# Patient Record
Sex: FEMALE | Race: WHITE | NOT HISPANIC OR LATINO | Employment: FULL TIME | URBAN - METROPOLITAN AREA
[De-identification: names, ages, dates, MRNs, and addresses within clinical notes are randomized per-mention and may not be internally consistent; named-entity substitution may affect disease eponyms.]

---

## 2017-03-17 DIAGNOSIS — I10 ESSENTIAL HYPERTENSION: ICD-10-CM

## 2017-03-17 RX ORDER — LISINOPRIL AND HYDROCHLOROTHIAZIDE 20; 25 MG/1; MG/1
1 TABLET ORAL DAILY
Qty: 30 TABLET | Refills: 1 | Status: SHIPPED | OUTPATIENT
Start: 2017-03-17 | End: 2017-05-08 | Stop reason: SDUPTHER

## 2017-03-17 NOTE — TELEPHONE ENCOUNTER
----- Message from Noahhsaymini Machadoes sent at 3/17/2017 12:13 PM CDT -----  Contact: [phone]  Pt would like refills called in on Rx Medication Lisinopril.  Please call back @ [phone].  Thanks-[facility]       Pt Uses:  .  [facility]/pharmacy #[id number] - [place] - [address] [facility] [address] [place]  [address] [place] [address]  [place] [address]  Phone: [phone] Fax: [phone]

## 2017-03-24 ENCOUNTER — TELEPHONE (OUTPATIENT)
Dept: FAMILY MEDICINE | Facility: CLINIC | Age: 47
End: 2017-03-24

## 2017-03-24 DIAGNOSIS — F41.9 ANXIETY: ICD-10-CM

## 2017-03-24 DIAGNOSIS — Z00.00 ROUTINE HEALTH MAINTENANCE: Primary | ICD-10-CM

## 2017-03-24 DIAGNOSIS — M79.7 FIBROMYALGIA: ICD-10-CM

## 2017-03-24 RX ORDER — DULOXETIN HYDROCHLORIDE 60 MG/1
CAPSULE, DELAYED RELEASE ORAL
Qty: 30 CAPSULE | Refills: 1 | Status: SHIPPED | OUTPATIENT
Start: 2017-03-24 | End: 2017-05-18 | Stop reason: SDUPTHER

## 2017-05-08 DIAGNOSIS — I10 ESSENTIAL HYPERTENSION: ICD-10-CM

## 2017-05-08 RX ORDER — LISINOPRIL AND HYDROCHLOROTHIAZIDE 20; 25 MG/1; MG/1
TABLET ORAL
Qty: 30 TABLET | Refills: 1 | Status: SHIPPED | OUTPATIENT
Start: 2017-05-08 | End: 2017-06-06 | Stop reason: SDUPTHER

## 2017-05-18 DIAGNOSIS — M79.7 FIBROMYALGIA: ICD-10-CM

## 2017-05-18 DIAGNOSIS — F41.9 ANXIETY: ICD-10-CM

## 2017-05-18 RX ORDER — DULOXETIN HYDROCHLORIDE 60 MG/1
CAPSULE, DELAYED RELEASE ORAL
Qty: 30 CAPSULE | Refills: 10 | Status: SHIPPED | OUTPATIENT
Start: 2017-05-18 | End: 2017-06-06 | Stop reason: SDUPTHER

## 2017-06-06 ENCOUNTER — OFFICE VISIT (OUTPATIENT)
Dept: FAMILY MEDICINE | Facility: CLINIC | Age: 47
End: 2017-06-06
Payer: COMMERCIAL

## 2017-06-06 VITALS
HEART RATE: 101 BPM | BODY MASS INDEX: 25.45 KG/M2 | SYSTOLIC BLOOD PRESSURE: 123 MMHG | DIASTOLIC BLOOD PRESSURE: 83 MMHG | HEIGHT: 64 IN | OXYGEN SATURATION: 96 % | TEMPERATURE: 99 F | WEIGHT: 149.06 LBS

## 2017-06-06 DIAGNOSIS — F41.9 ANXIETY: ICD-10-CM

## 2017-06-06 DIAGNOSIS — M79.7 FIBROMYALGIA: ICD-10-CM

## 2017-06-06 DIAGNOSIS — I10 ESSENTIAL HYPERTENSION: ICD-10-CM

## 2017-06-06 DIAGNOSIS — G47.00 INSOMNIA, UNSPECIFIED TYPE: ICD-10-CM

## 2017-06-06 PROCEDURE — 99999 PR PBB SHADOW E&M-EST. PATIENT-LVL III: CPT | Mod: PBBFAC,,,

## 2017-06-06 PROCEDURE — 99214 OFFICE O/P EST MOD 30 MIN: CPT | Mod: S$GLB,,,

## 2017-06-06 RX ORDER — DULOXETIN HYDROCHLORIDE 60 MG/1
60 CAPSULE, DELAYED RELEASE ORAL DAILY
Qty: 90 CAPSULE | Refills: 3 | Status: SHIPPED | OUTPATIENT
Start: 2017-06-06 | End: 2017-10-16 | Stop reason: SDUPTHER

## 2017-06-06 RX ORDER — ZOLPIDEM TARTRATE 5 MG/1
5 TABLET ORAL NIGHTLY PRN
Qty: 90 TABLET | Refills: 1 | Status: SHIPPED | OUTPATIENT
Start: 2017-06-06 | End: 2017-10-17

## 2017-06-06 RX ORDER — LISINOPRIL AND HYDROCHLOROTHIAZIDE 20; 25 MG/1; MG/1
1 TABLET ORAL DAILY
Qty: 90 TABLET | Refills: 3 | Status: SHIPPED | OUTPATIENT
Start: 2017-06-06 | End: 2017-06-10 | Stop reason: SDUPTHER

## 2017-06-06 RX ORDER — HYDROXYZINE HYDROCHLORIDE 50 MG/1
50 TABLET, FILM COATED ORAL NIGHTLY PRN
Qty: 30 TABLET | Refills: 11 | Status: CANCELLED | OUTPATIENT
Start: 2017-06-06

## 2017-06-06 RX ORDER — CYCLOBENZAPRINE HCL 10 MG
10 TABLET ORAL
COMMUNITY
Start: 2016-03-01 | End: 2017-06-06 | Stop reason: SDUPTHER

## 2017-06-07 ENCOUNTER — TELEPHONE (OUTPATIENT)
Dept: FAMILY MEDICINE | Facility: CLINIC | Age: 47
End: 2017-06-07

## 2017-06-07 NOTE — TELEPHONE ENCOUNTER
----- Message from Tata Parham sent at 6/7/2017 12:09 PM CDT -----  Pt states she need her prescription refill on symbalta, lisenopril and ambien all call to [address] [place] [address] pharmacy. Pt can be reach at [phone].

## 2017-06-07 NOTE — PROGRESS NOTES
Subjective:       Patient ID: Kylee Cha is a 46 y.o. female.    Chief Complaint: Medication Refill    HPI   The patient presents today for an annual physical exam and refill on medications she takes routinely for chronic health issues.  The patient voices a history of hypertension for which she currently takes lisinopril/HCTZ. she is tolerating the medication well and is in good compliance.  The patient is experiencing no side effects.  Counseling was offered regarding low salt diets. she denies  a reduced salt intake. she  denies chest pain, palpitations, shortness of breath, dyspnea on exertion, neck pain, nausea, vomiting, diaphoresis, paroxysmal nocturnal dyspnea, or orthopnea.  She denies a regular exercise routine.     The patient voices a chronic history of anxiety and fibromyositis for which she currently takes Cymbalta 60 mg by mouth daily.  She says this keeps the symptoms of both disorders stable and well controlled.  She denies any side effects from the medication.    Patient voices a chronic history of insomnia which she says is constant and moderate to severe in intensity.  She says she gets 2-3 hours of sleep per night.  She has been taking Atarax which she says no longer works.  She says she has not tried any other sleep medications.  She voices some associated daytime sleepiness.  She cannot identify exacerbating or mitigating factors.   .      Review of Systems   Constitutional: Negative for activity change, appetite change, fatigue and unexpected weight change.   HENT: Negative.    Eyes: Negative.    Respiratory: Negative for cough, chest tightness, shortness of breath and wheezing.    Cardiovascular: Negative for chest pain, palpitations and leg swelling.        Elevated blood pressure     Gastrointestinal: Negative for constipation, diarrhea, nausea and vomiting.   Endocrine: Negative.    Genitourinary: Negative.    Musculoskeletal: Positive for myalgias.   Skin: Negative for color  change.   Allergic/Immunologic: Negative.    Neurological: Negative for dizziness, weakness and light-headedness.   Hematological: Negative.    Psychiatric/Behavioral: Positive for sleep disturbance. The patient is nervous/anxious.          Objective:      Physical Exam   Constitutional: She is oriented to person, place, and time. She appears well-developed and well-nourished. No distress.   HENT:   Head: Normocephalic and atraumatic. Hair is normal.   Right Ear: Hearing, tympanic membrane, external ear and ear canal normal.   Left Ear: Hearing, tympanic membrane, external ear and ear canal normal.   Nose: No mucosal edema, rhinorrhea, nose lacerations, sinus tenderness, nasal deformity, septal deviation or nasal septal hematoma. No epistaxis.  No foreign bodies. Right sinus exhibits no maxillary sinus tenderness and no frontal sinus tenderness. Left sinus exhibits no maxillary sinus tenderness and no frontal sinus tenderness.   Mouth/Throat: Uvula is midline, oropharynx is clear and moist and mucous membranes are normal.   Eyes: Conjunctivae are normal. Pupils are equal, round, and reactive to light. Right eye exhibits no discharge. Left eye exhibits no discharge.   Neck: Trachea normal, normal range of motion and phonation normal. Neck supple. No tracheal deviation present.   Cardiovascular: Normal rate, regular rhythm, normal heart sounds and intact distal pulses.  Exam reveals no gallop and no friction rub.    No murmur heard.  Pulmonary/Chest: Effort normal and breath sounds normal. No respiratory distress. She has no decreased breath sounds. She has no wheezes. She has no rhonchi. She has no rales. She exhibits no tenderness.   Musculoskeletal: Normal range of motion.   Lymphadenopathy:        Head (right side): No submental, no submandibular, no tonsillar, no preauricular, no posterior auricular and no occipital adenopathy present.        Head (left side): No submental, no submandibular, no tonsillar, no  preauricular, no posterior auricular and no occipital adenopathy present.     She has no cervical adenopathy.        Right cervical: No superficial cervical, no deep cervical and no posterior cervical adenopathy present.       Left cervical: No superficial cervical, no deep cervical and no posterior cervical adenopathy present.   Neurological: She is alert and oriented to person, place, and time. She exhibits normal muscle tone. GCS eye subscore is 4. GCS verbal subscore is 5. GCS motor subscore is 6.   Skin: Skin is warm and dry. No rash noted. She is not diaphoretic. No erythema. No pallor.   Psychiatric: She has a normal mood and affect. Her speech is normal and behavior is normal. Judgment and thought content normal.       Assessment:       1. Essential hypertension    2. Insomnia, unspecified type    3. Anxiety    4. Fibromyalgia          Plan:   Essential hypertension  -     lisinopril-hydrochlorothiazide (PRINZIDE,ZESTORETIC) 20-25 mg Tab; Take 1 tablet by mouth once daily.  Dispense: 90 tablet; Refill: 3  -     CBC auto differential; Future; Expected date: 06/06/2017  -     Comprehensive metabolic panel; Future; Expected date: 06/06/2017  -     TSH; Future; Expected date: 06/06/2017  -     Lipid panel; Future; Expected date: 06/06/2017  -     Cancel: Microalbumin/creatinine urine ratio  -     Microalbumin/creatinine urine ratio; Future; Expected date: 06/13/2017    Insomnia, unspecified type  -     zolpidem (AMBIEN) 5 MG Tab; Take 1 tablet (5 mg total) by mouth nightly as needed.  Dispense: 90 tablet; Refill: 1  -     CBC auto differential; Future; Expected date: 06/06/2017  -     Comprehensive metabolic panel; Future; Expected date: 06/06/2017  -     TSH; Future; Expected date: 06/06/2017  -     Lipid panel; Future; Expected date: 06/06/2017  -     Cancel: Microalbumin/creatinine urine ratio  -     Microalbumin/creatinine urine ratio; Future; Expected date: 06/13/2017    Anxiety  -     duloxetine (CYMBALTA)  60 MG capsule; Take 1 capsule (60 mg total) by mouth once daily.  Dispense: 90 capsule; Refill: 3  -     CBC auto differential; Future; Expected date: 06/06/2017  -     Comprehensive metabolic panel; Future; Expected date: 06/06/2017  -     TSH; Future; Expected date: 06/06/2017  -     Lipid panel; Future; Expected date: 06/06/2017  -     Cancel: Microalbumin/creatinine urine ratio  -     Microalbumin/creatinine urine ratio; Future; Expected date: 06/13/2017    Fibromyalgia  -     duloxetine (CYMBALTA) 60 MG capsule; Take 1 capsule (60 mg total) by mouth once daily.  Dispense: 90 capsule; Refill: 3  -     CBC auto differential; Future; Expected date: 06/06/2017  -     Comprehensive metabolic panel; Future; Expected date: 06/06/2017  -     TSH; Future; Expected date: 06/06/2017  -     Lipid panel; Future; Expected date: 06/06/2017  -     Cancel: Microalbumin/creatinine urine ratio    Other orders  -     Cancel: hydrOXYzine (ATARAX) 50 MG tablet; Take 1 tablet (50 mg total) by mouth nightly as needed (insomnia).  Dispense: 30 tablet; Refill: 11            Disclaimer: This note is prepared using voice recognition software.  As such there may be errors in the dictation.  It has not been proofread.

## 2017-06-10 DIAGNOSIS — I10 ESSENTIAL HYPERTENSION: ICD-10-CM

## 2017-06-10 RX ORDER — LISINOPRIL AND HYDROCHLOROTHIAZIDE 20; 25 MG/1; MG/1
TABLET ORAL
Qty: 30 TABLET | Refills: 0 | Status: SHIPPED | OUTPATIENT
Start: 2017-06-10 | End: 2017-10-20

## 2017-06-10 NOTE — TELEPHONE ENCOUNTER
I am refilling a requested medication x 1 for the patient and reviewed the chart.  The patient needs to get her lab done that Bryce ordered

## 2017-08-06 ENCOUNTER — NURSE TRIAGE (OUTPATIENT)
Dept: ADMINISTRATIVE | Facility: CLINIC | Age: 47
End: 2017-08-06

## 2017-08-06 NOTE — TELEPHONE ENCOUNTER
"    Reason for Disposition   Patient sounds very sick or weak to the triager    Answer Assessment - Initial Assessment Questions  1. BLOOD GLUCOSE: "What is your blood glucose level?"       410  2. ONSET: "When did you check the blood glucose?"      2 hours ago   3. USUAL RANGE: "What is your glucose level usually?" (e.g., usual fasting morning value, usual evening value)      n/a  4. KETONES: "Do you check for ketones (urine or blood test strips)?" If yes, ask: "What does the test show now?"       n/a  5. TYPE 1 or 2:  "Do you know what type of diabetes you have?"  (e.g., Type 1, Type 2, Gestational; doesn't know)       Type   6. INSULIN: "Do you take insulin?" If yes, ask: "Have you missed any shots recently?"      N/a. Has not been diagnosed yet  7. DIABETES PILLS: "Do you take any pills for your diabetes?" If yes, ask: "Have you missed taking any pills recently?"      n/a  8. OTHER SYMPTOMS: "Do you have any symptoms?" (e.g., fever, frequent urination, difficulty breathing, dizziness, weakness, vomiting)      headache  9. PREGNANCY: "Is there any chance you are pregnant?" "When was your last menstrual period?"      Did not assess    Protocols used:  [facility]    Mr. Cha called on his wife's behalf. He is in transit to [facility] to evaluate her for high blood sugar. They went to [place] and patient's BS on arrival was 619, it went down to 410 after they administered insulin, but they were unable to start an IV and now they are enroute to the hospital to [facility]. Advised Mr. Cha to let ED md know patient needs testing supplies prescribed to go home with. Sending message to Dr. Benjamin's staff informing them that patient wants first available appt in the morning because [facility] is unable to book in private slots held. Mr. Cha. Mr. Cha verbalized understanding.   "

## 2017-08-07 ENCOUNTER — TELEPHONE (OUTPATIENT)
Dept: FAMILY MEDICINE | Facility: CLINIC | Age: 47
End: 2017-08-07

## 2017-08-07 ENCOUNTER — OFFICE VISIT (OUTPATIENT)
Dept: FAMILY MEDICINE | Facility: CLINIC | Age: 47
End: 2017-08-07
Payer: COMMERCIAL

## 2017-08-07 ENCOUNTER — LAB VISIT (OUTPATIENT)
Dept: LAB | Facility: HOSPITAL | Age: 47
End: 2017-08-07
Attending: NURSE PRACTITIONER
Payer: COMMERCIAL

## 2017-08-07 VITALS
HEART RATE: 80 BPM | DIASTOLIC BLOOD PRESSURE: 91 MMHG | SYSTOLIC BLOOD PRESSURE: 141 MMHG | WEIGHT: 148.56 LBS | HEIGHT: 64 IN | BODY MASS INDEX: 25.36 KG/M2

## 2017-08-07 DIAGNOSIS — E11.9 NEW ONSET TYPE 2 DIABETES MELLITUS: ICD-10-CM

## 2017-08-07 DIAGNOSIS — E11.9 NEW ONSET TYPE 2 DIABETES MELLITUS: Primary | ICD-10-CM

## 2017-08-07 LAB
C PEPTIDE SERPL-MCNC: 3.17 NG/ML
INSULIN COLLECTION INTERVAL: NORMAL
INSULIN SERPL-ACNC: 6.2 UU/ML

## 2017-08-07 PROCEDURE — 4010F ACE/ARB THERAPY RXD/TAKEN: CPT | Mod: S$GLB,,, | Performed by: NURSE PRACTITIONER

## 2017-08-07 PROCEDURE — 36415 COLL VENOUS BLD VENIPUNCTURE: CPT | Mod: PO

## 2017-08-07 PROCEDURE — 83525 ASSAY OF INSULIN: CPT

## 2017-08-07 PROCEDURE — 84681 ASSAY OF C-PEPTIDE: CPT

## 2017-08-07 PROCEDURE — 99999 PR PBB SHADOW E&M-EST. PATIENT-LVL III: CPT | Mod: PBBFAC,,, | Performed by: NURSE PRACTITIONER

## 2017-08-07 PROCEDURE — 3008F BODY MASS INDEX DOCD: CPT | Mod: S$GLB,,, | Performed by: NURSE PRACTITIONER

## 2017-08-07 PROCEDURE — 99214 OFFICE O/P EST MOD 30 MIN: CPT | Mod: S$GLB,,, | Performed by: NURSE PRACTITIONER

## 2017-08-07 RX ORDER — INSULIN PUMP SYRINGE, 3 ML
EACH MISCELLANEOUS
Qty: 1 EACH | Refills: 0 | Status: SHIPPED | OUTPATIENT
Start: 2017-08-07 | End: 2017-09-14 | Stop reason: SDUPTHER

## 2017-08-07 RX ORDER — METFORMIN HYDROCHLORIDE 500 MG/1
500 TABLET ORAL 2 TIMES DAILY WITH MEALS
Qty: 60 TABLET | Refills: 0 | Status: SHIPPED | OUTPATIENT
Start: 2017-08-07 | End: 2017-08-24 | Stop reason: SDUPTHER

## 2017-08-07 RX ORDER — INSULIN GLARGINE 100 [IU]/ML
10 INJECTION, SOLUTION SUBCUTANEOUS NIGHTLY
Qty: 3 ML | Refills: 1 | Status: SHIPPED | OUTPATIENT
Start: 2017-08-07 | End: 2017-08-10 | Stop reason: SDUPTHER

## 2017-08-07 RX ORDER — TRIAMCINOLONE ACETONIDE 5 MG/G
CREAM TOPICAL 2 TIMES DAILY
Qty: 30 G | Refills: 1 | Status: SHIPPED | OUTPATIENT
Start: 2017-08-07 | End: 2017-08-07 | Stop reason: CLARIF

## 2017-08-07 NOTE — PATIENT INSTRUCTIONS
Using an Injection Pen  Your healthcare provider has prescribed a medicine that you can give yourself using an injection pen. One medicine that is often given with an injection pen is insulin. Injection pens are popular because they are easy to use. Also many people feel more comfortable using something that looks like a pen instead of a syringe. Some kinds of pens you can throw away (disposable). Others should not be thrown away (nondisposable).  Disposable pens come already filled with a set amount of medicine. Once you inject the medicine you throw the pen away. With nondisposable pens, you replace the medicine mari (cartridge) when it is empty.  Both types of pens use a pen needle. This is screwed onto the tip of the pen before you use it. Pen needles come in different lengths and thicknesses.  Home care  Follow these steps when using the injection pen. First, get these supplies together:  How to Check Your Blood Sugar    Keeping track of how much sugar (glucose) is in your blood is an important part of self-care when you have diabetes. This is also called self-monitoring of blood glucose (SMBG). To make sure your glucose and insulin are in balance, check your blood sugar as instructed by your healthcare provider. You may need to check your blood glucose levels at certain times every day. Or you may need to check them only a few times a week.  What you need  To check your blood sugar, make sure you have the following:   · A small pricking needle (lancing device)  · Test strips  · A glucose meter  · A notebook, chart, or log book and pen or pencil   Using a blood glucose meter  You can check your blood sugar at home, at work, and anywhere else. Your diabetes team will help you choose a blood glucose meter. A meter measures the amount of glucose in a tiny drop of blood. Youll use a device called a lancet to draw a drop of blood. Put the strip in the meter first. Then touch the test strip to the drop of blood.  The meter then gives you a number (reading) that tells you the level of your blood sugar.  Aim for your target range  Your blood sugar should be in your target range--not too high and not too low. A target range is where your blood sugar level is healthiest. Staying in this range as much as possible will help lower your risk for health problems (complications). Your diabetes team will help you figure out the best target range for you. That range depends on many things. They include your age, other health problems, how well your diabetes is controlled, and how long you have had diabetes. In general, target ranges are:  · Control of blood glucose (hemoglobin A1c or A1c): generally, 7.0% or less. You will usually have this test at the lab.  · Before a meal (preprandial glucose): Between 80 and 130 mg/dL.  · One to 2 hours after a meal (postprandial glucose): Less than 180 mg/dL.  Track your readings  Use a notebook, chart, or log book to keep track of your readings. Write down the date, time, and your blood sugar level numbers. This helps you see patterns, such as high blood sugar after eating certain foods. Take your log along when you see your healthcare provider. Your blood glucose levels will help your provider decide if he or she needs to make any changes to your management plan. To check your blood sugar, follow the steps below.  Step 1. Get ready  · Wash your hands with soap and warm (not hot) water.  · Follow all of the instructions that came with your glucometer. Be sure that your test strips were designed to be used with your meter and are not .   Step 2. Draw a drop of blood  · Prick the side of your finger with the lancet. Squeeze gently until you get a drop of blood. Squeezing too hard can cause an inaccurate reading.  · Put the lancet in a special sharps container. Ask your healthcare team where you can buy one or what you can use to throw away any sharps.  · If you are unable to get enough blood,  hold your hand at your side and gently shake it.  Step 3. Place the drop on a strip  · Wait for the meter to show a message or symbol that it is time to test.  · Touch the test strip to the drop of blood.  · Be sure to follow the instructions included with meter.  Step 4. Read and record your results  · Wait for your meter to show the result.  · If you see an error message, recheck using a fresh strip and a fresh drop of blood. Also recheck if the glucose numbers aren't what you expect--too low without symptoms, or too high for no reason.  · Write the results in your log book.  Date Last Reviewed: 2016  © 5107-2740 [employer]. [address] [place] [address], [place] [address]. All rights reserved. This information is not intended as a substitute for professional medical care. Always follow your healthcare professional's instructions.        · Alcohol swabs  · Injector pen  · Cartridge, if the pen is nondisposable  · Special container for the used needles and disposable pens (sharps container). You can buy a sharps container at a pharmacy or medical supply store. You can also use an empty laundry detergent bottle, or any other puncture-proof container and lid.  Then get the pen ready:  · Wash your hands.  · Remove the pen cap.  · Check the medicine to make sure it is the type your healthcare provider prescribed. Make sure that it has not , and is not discolored, frosted, or lumpy. If the medicine doesn't look right to you, dont use it. Get a new cartridge or a new disposable pen. Never share injection pens or medicine cartridges.  · Attach a needle to your pen. Read the directions that came with your pen. They contain steps for attaching a needle. If youre using a nondisposable pen, dont leave the needle attached to the pen between shots.  · Mix the medicine by rolling the pen between the palms of your hands about 20 times. You can also tip the pen back and forth.  Prime your pen and make sure  that it is working by doing a test shot into the air. Do this before your actually inject the medicine. Then set the dose.  · Dial the pen to deliver 2 or 3 units of medicine.  · Hold the pen like a pencil, with the needle pointing up.  · Tap the barrel of the pen. This will make sure any air bubbles in the cartridge float to the top of the cartridge.  · Push down firmly on the pen's injector button. This will shoot medicine into the air. You should see a couple of drops of medicine come out of the needle. If nothing comes out, try doing another air shot. If medicine still doesn't come out after a second try, your pen may be low on medicine. Or the needle may not be connected properly. Read the troubleshooting tips in the directions that came with your pen.  · Set your dose. Dial the pen to deliver the amount of medicine you need to take. As you turn the dial, you should hear a clicking sound. Your pen is now ready to use.  · Choose an injection site. The belly (abdomen), upper arms, thighs, and buttocks are the most common sites to use. Stay away from sites that are close to a mole or scar, or that are closer than 2 inches to your belly button.  · Make sure the site is clean. Clean it with an alcohol swab. Let it dry.  · Pinch up a fold of skin around the site you have selected. Hold it firmly with one hand.  · Hold the injection pen like a pencil in your other hand.  Inject your medicine  Insert the needle into your pinched-up skin. The needle should be straight up and down. Thin adults or children may need to inject with the needle slightly to the left or right.  · Make sure the needle gets all the way into the fatty tissue below the skin.  · Push the pen injection button. Unless you take a very small dose, the injection should take a couple of seconds.  · Let go of the skin and remove the needle from your skin.  After the injection  · For a nondisposable pen, remove the needle by unscrewing it.  · Put any used  needles and disposable pens in the sharps container. Make sure that needles point down. Never put your fingers into the container.  · When the sharps container is full, take it back to your healthcare facility. The staff will get rid of it for you.  Follow-up care  Follow up with your healthcare provider, or as advised.  When to seek medical advice   Call your healthcare provider right away if any of these occur:  · Problems that keep you from giving your injection  · Bleeding at the injection site for more than 10 minutes  · Pain at the injection site that does not go away  · You inject the medicine in the wrong area  · You inject too much of the medicine  · Rash at the injection site  · Fever of 100.4°F (38°C) or higher  · Redness, warmth, swelling, or drainage at the injection site  · Signs of allergic reaction. These include trouble breathing, hives, or a rash.  Date Last Reviewed: 6/1/2016  © 2612-3311 [employer]. [address] [place] [address], [place] [address]. All rights reserved. This information is not intended as a substitute for professional medical care. Always follow your healthcare professional's instructions.

## 2017-08-07 NOTE — TELEPHONE ENCOUNTER
Patient is a new diabetic  Is there anyway this patient can be added in to your schedule this week in [place]

## 2017-08-07 NOTE — PROGRESS NOTES
Subjective:       Patient ID: Kylee Cha is a 47 y.o. female.    Chief Complaint: No chief complaint on file.    She comes into the office for follow-up from emergency room.  She reports that for the last 4-6 months she has had symptoms of polyuria, polydipsia, polyphagia, blurry vision, fatigue, and recurrent yeast infections.  She has been seeing her OB/GYN for yeast infections and has been treated several times.  2 weeks ago her GYN obtained blood work which showed a hemoglobin A1c of 16.4.  She was told that she needed to follow-up with her primary care doctor.  She states that this weekend she began feeling bad and she went to [place] emergency room, she was told she needed to be admitted for a glucose that was over 600 but declined.  She left [place] after being given insulin and fluids in the hospital, glucose was still elevated over 400.  She states that she then went to [facility] emergency room, glucose remained over 300.  She was given another dose of insulin and was discharged home told to follow-up with her primary care doctor.  She was not discharged with medications or a glucometer.        Review of Systems   Constitutional: Positive for activity change, appetite change and fatigue. Negative for fever and unexpected weight change.   HENT: Negative for ear pain and sore throat.    Eyes: Negative for pain and visual disturbance.   Respiratory: Negative for cough and shortness of breath.    Cardiovascular: Negative for chest pain and palpitations.   Gastrointestinal: Negative for abdominal pain, diarrhea and vomiting.   Endocrine: Positive for polydipsia, polyphagia and polyuria.   Musculoskeletal: Negative for arthralgias and myalgias.   Skin: Negative for color change and rash.   Neurological: Negative for dizziness and headaches.   Psychiatric/Behavioral: Negative for dysphoric mood and sleep disturbance. The patient is not nervous/anxious.        Vitals:    08/07/17 0816    BP: (!) 141/91   Pulse: 80       Objective:     Current Outpatient Prescriptions   Medication Sig Dispense Refill    duloxetine (CYMBALTA) 60 MG capsule Take 1 capsule (60 mg total) by mouth once daily. 90 capsule 3    lisinopril-hydrochlorothiazide (PRINZIDE,ZESTORETIC) 20-25 mg Tab TAKE 1 TABLET EVERY DAY 30 tablet 0    mupirocin calcium 2% (BACTROBAN) 2 % cream Apply topically 3 (three) times daily. 15 g 1    sulfamethoxazole-trimethoprim 800-160mg (BACTRIM DS) 800-160 mg Tab Take 1 tablet by mouth 2 (two) times daily. 14 tablet 0    zolpidem (AMBIEN) 5 MG Tab Take 1 tablet (5 mg total) by mouth nightly as needed. 90 tablet 1    blood-glucose meter (BLOOD GLUCOSE MONITORING) kit Glucometer, strips, and lancets as covered by insurance. Use as instructed 1 each 0    insulin glargine (LANTUS) 100 unit/mL injection Inject 10 Units into the skin every evening. 3 mL 1    metformin (GLUCOPHAGE) 500 MG tablet Take 1 tablet (500 mg total) by mouth 2 (two) times daily with meals. 60 tablet 0     No current facility-administered medications for this visit.        Physical Exam   Constitutional: She is oriented to person, place, and time. She appears well-developed and well-nourished. No distress.   HENT:   Head: Normocephalic and atraumatic.   Eyes: EOM are normal. Pupils are equal, round, and reactive to light.   Neck: Normal range of motion. Neck supple.   Cardiovascular: Normal rate and regular rhythm.    Pulmonary/Chest: Effort normal and breath sounds normal.   Musculoskeletal: Normal range of motion.   Neurological: She is alert and oriented to person, place, and time.   Skin: Skin is warm and dry. No rash noted.   Psychiatric: She has a normal mood and affect. Judgment normal.   Nursing note and vitals reviewed.      Assessment:       1. New onset type 2 diabetes mellitus        Plan:   New onset type 2 diabetes mellitus  -     Ambulatory Referral to Diabetes Education  -     INSULIN, RANDOM; Future;  Expected date: 08/07/2017  -     C-PEPTIDE; Future; Expected date: 08/07/2017  -     blood-glucose meter (BLOOD GLUCOSE MONITORING) kit; Glucometer, strips, and lancets as covered by insurance. Use as instructed  Dispense: 1 each; Refill: 0    Other orders  -     insulin glargine (LANTUS) 100 unit/mL injection; Inject 10 Units into the skin every evening.  Dispense: 3 mL; Refill: 1  -     metformin (GLUCOPHAGE) 500 MG tablet; Take 1 tablet (500 mg total) by mouth 2 (two) times daily with meals.  Dispense: 60 tablet; Refill: 0        Return in about 1 week (around 8/14/2017), or if symptoms worsen or fail to improve, for Reevaluation.

## 2017-08-08 NOTE — TELEPHONE ENCOUNTER
LM for patient to call back at her earliest convenience in regards to scheduling an appointment. Informed patient there is at 10:00 slot available in [place] on Thursday, and to call back if she wants to be scheduled for that time.

## 2017-08-10 ENCOUNTER — OFFICE VISIT (OUTPATIENT)
Dept: DIABETES | Facility: CLINIC | Age: 47
End: 2017-08-10
Payer: COMMERCIAL

## 2017-08-10 VITALS
HEIGHT: 64 IN | BODY MASS INDEX: 24.69 KG/M2 | WEIGHT: 144.63 LBS | SYSTOLIC BLOOD PRESSURE: 112 MMHG | DIASTOLIC BLOOD PRESSURE: 86 MMHG

## 2017-08-10 DIAGNOSIS — E11.9 TYPE 2 DIABETES MELLITUS WITHOUT COMPLICATION, UNSPECIFIED LONG TERM INSULIN USE STATUS: Primary | ICD-10-CM

## 2017-08-10 DIAGNOSIS — I10 ESSENTIAL HYPERTENSION: ICD-10-CM

## 2017-08-10 LAB — GLUCOSE SERPL-MCNC: 294 MG/DL (ref 70–110)

## 2017-08-10 PROCEDURE — 99215 OFFICE O/P EST HI 40 MIN: CPT | Mod: S$GLB,,, | Performed by: NURSE PRACTITIONER

## 2017-08-10 PROCEDURE — 82948 REAGENT STRIP/BLOOD GLUCOSE: CPT | Mod: S$GLB,,, | Performed by: NURSE PRACTITIONER

## 2017-08-10 PROCEDURE — 3079F DIAST BP 80-89 MM HG: CPT | Mod: S$GLB,,, | Performed by: NURSE PRACTITIONER

## 2017-08-10 PROCEDURE — 99999 PR PBB SHADOW E&M-EST. PATIENT-LVL III: CPT | Mod: PBBFAC,,, | Performed by: NURSE PRACTITIONER

## 2017-08-10 PROCEDURE — 4010F ACE/ARB THERAPY RXD/TAKEN: CPT | Mod: S$GLB,,, | Performed by: NURSE PRACTITIONER

## 2017-08-10 PROCEDURE — 3008F BODY MASS INDEX DOCD: CPT | Mod: S$GLB,,, | Performed by: NURSE PRACTITIONER

## 2017-08-10 PROCEDURE — 3074F SYST BP LT 130 MM HG: CPT | Mod: S$GLB,,, | Performed by: NURSE PRACTITIONER

## 2017-08-10 RX ORDER — PEN NEEDLE, DIABETIC 31 GX5/16"
NEEDLE, DISPOSABLE MISCELLANEOUS
COMMUNITY
Start: 2017-08-07

## 2017-08-10 RX ORDER — INSULIN GLARGINE 100 [IU]/ML
25 INJECTION, SOLUTION SUBCUTANEOUS NIGHTLY
COMMUNITY
Start: 2017-08-07 | End: 2017-11-08

## 2017-08-10 RX ORDER — VALACYCLOVIR HYDROCHLORIDE 1 G/1
1000 TABLET, FILM COATED ORAL
COMMUNITY
Start: 2017-08-07 | End: 2022-03-17

## 2017-08-10 NOTE — LETTER
August 10, 2017      Marcus Lancaster NP  [address]  [place] [address]           [facility]  [address] [place] [address]  Phone: [phone]  Fax: [phone]          Patient: Kylee Cha   MR Number: [id number]   YOB: 1970   Date of Visit: 8/10/2017       Dear Marcus Lancaster:    Thank you for referring Kylee Cha to me for evaluation. Attached you will find relevant portions of my assessment and plan of care.    If you have questions, please do not hesitate to call me. I look forward to following Kylee Cha along with you.    Sincerely,    GATITO Marx, CDE    Enclosure  CC:  No Recipients    If you would like to receive this communication electronically, please contact [email or web address] or [phone] to request more information on [facility] access.    For providers and/or their staff who would like to refer a patient to [facility], please contact us through our one-stop-shop provider referral line, [facility], at [phone].    If you feel you have received this communication in error or would no longer like to receive these types of communications, please e-mail [email or web address]

## 2017-08-10 NOTE — PROGRESS NOTES
"PCP: Dirk Benjamin MD    Subjective:     Chief Complaint: Diabetes, establish [facility]    HISTORY OF PRESENT ILLNESS: 47 year old  female presenting to establish care for diabetes. Patient was recently diagnosed with Type II diabetes by her OB-GYN after having recurrent yeast infections. She has the following complications: none. She  is to be enrolled in diabetes education classes, but declines to due to location in [place].      Blood glucose testing is performed regularly.  No meter today; but, per recall BG range from 228 - 300.  She denies any recent hospital admissions, emergency room visits, or hypoglycemia.     Height: 5' 4" (162.6 cm)  //  Weight: 65.6 kg (144 lb 9.6 oz), Body mass index is 24.82 kg/m².  Home Blood Glucose reading this AM: 228 mg/dl fasting.  Her blood sugar in clinic today is:    Lab Results   Component Value Date    POCGLU 294 (A) 08/10/2017     Labs Reviewed. ADA recommends A1C of less than 7 %. Her most recent A1C is:     Lab Results   Component Value Date    CPEPTIDE 3.17 08/07/2017     DM MEDICATIONS:   Lantus 10 units daily at bedtime  Metformin 500 mg BID     Review of Systems   Constitutional: Positive for fatigue. Negative for appetite change, diaphoresis and unexpected weight change.   HENT: Negative for congestion, hearing loss, rhinorrhea, sneezing and sore throat.    Eyes: Positive for visual disturbance.   Respiratory: Negative for cough, shortness of breath and wheezing.    Cardiovascular: Negative for leg swelling.   Gastrointestinal: Negative for abdominal pain, constipation, diarrhea, nausea and vomiting.   Endocrine: Positive for polyphagia. Negative for cold intolerance, heat intolerance, polydipsia and polyuria.   Genitourinary: Negative for difficulty urinating, dysuria and urgency.   Skin: Negative for color change, pallor and wound.   Neurological: Negative for dizziness, seizures, syncope, numbness and headaches.   Psychiatric/Behavioral: Negative for " confusion and decreased concentration. The patient is not nervous/anxious.        STANDARDS OF CARE:  Current Ophthalmologist / Optometrist: [facility] - no dilated eye exam, but plans to schedule. Last exam as noted.   Current Podiatrist: None  Recommend regular exams and denies gums bleeding.    ACTIVITY LEVEL: Rarely Active  EXERCISE: None  MEAL PLANNING: Patient reports number of meals per day to be 3 and number of snacks per day to be 1, debbi chips with humus, salsa, guacamole.  Breakfast can be yogurt,  Lunch can be salad, grilled chicken OR pork with brown rice.  Dinner can be spaghetti.   Beverages include powerade zero, iced coffee with equal, OR water, unsweet tea.      Patient is encouraged to consume 45 - 60 grams of carbohydrates in each meal, and 1800 k / ella per day.  Per dietary recall, patient is limiting carbohydrates, saturated fats and sodium.     BLOOD GLUCOSE TESTING: Self -monitoring  SOCIAL HISTORY: . Lives with spouse. Current every day smoker.    Objective:      Physical Exam   Constitutional: She is oriented to person, place, and time. She appears well-developed and well-nourished.   HENT:   Head: Normocephalic and atraumatic.   Eyes: EOM are normal. Pupils are equal, round, and reactive to light.   Neck: Normal range of motion. No tracheal deviation present.   Cardiovascular: Normal rate, regular rhythm and intact distal pulses.  Exam reveals no friction rub.    No murmur heard.  Pulses:       Dorsalis pedis pulses are 2+ on the right side, and 2+ on the left side.   Pulmonary/Chest: Effort normal and breath sounds normal. She has no wheezes.   Abdominal: Soft. Bowel sounds are normal. She exhibits no distension. There is no tenderness.   Musculoskeletal: Normal range of motion. She exhibits no edema or deformity.   Feet:   Right Foot:   Protective Sensation: 6 sites tested. 6 sites sensed.   Skin Integrity: Negative for ulcer, blister, skin breakdown, erythema, warmth,  callus or dry skin.   Left Foot:   Protective Sensation: 6 sites tested. 6 sites sensed.   Skin Integrity: Negative for ulcer, blister, skin breakdown, erythema, warmth, callus or dry skin.   Neurological: She is alert and oriented to person, place, and time.   Skin: Skin is warm and dry. No rash noted.   Psychiatric: She has a normal mood and affect. Her behavior is normal. Judgment and thought content normal.       Assessment / Plan:     1.) Type 2 diabetes mellitus without complication, unspecified long term insulin use status  Comments:  - Continue metformin 500 mg BID.  Increase Lantus 20 units at bedtime.  For now, will working on titrating basal insulin to lower fasting BG between 80 - 130.  Patient agrees to send BG readings next week for review and adjustment of medications.  In the near future, would possibly consider starting GLP-1, or SGLT-2.  Will do labs next week to evaluate for HIRA.    Orders:  -     Lipid panel; Future; Expected date: 08/10/2017  -     POCT glucose  -     Insulin antibody; Future; Expected date: 08/10/2017  -     Glutamic acid decarboxylase; Future; Expected date: 08/10/2017  -     TSH; Future; Expected date: 08/10/2017  -     Hemoglobin A1c; Future; Expected date: 08/10/2017    2.) Essential hypertension - continue Lisinopril-HCT    Additional Plan Details:    1.) Patient was instructed to monitor blood glucose 2 - 3 x daily, fasting and ac dinner or at bedtime. Discussed ADA goal for fasting blood sugar, 80 - 130 mg/dL; pp blood sugars below 180 mg/dl. Also, discussed prevention of hypoglycemia and the need to adjust goals to higher levels if persistent hypoglycemia.  Reminded to bring BG records or meter to each visit for review.  2.) Reviewed pathophysiology of diabetes, complications related to the disease, importance of annual dilated eye exam and daily foot examination.  3.) We discussed the ADA recommendations: Hemoglobin A1c below 7.0 %. All patients with diabetes should  be on statins unless contraindicated.  ACE or ARB therapy if not contraindicated.    4.) Meal planning teaching: Carbohydrate definition - one serving is 15 gms. Carbohydrate spacing - carbohydrates should be spaced into approximately 3 meals with 2 snacks ( of one carbohydrate ) between meals or at bedtime. Increase vegetable intake to 2 or more cups of vegetables per day as well as 2 fruit servings. Recommended low saturated fat, low sodium diet to aid in control of hypertension and cholesterol.  5.) Discussed activity, benefits, methods, and precautions. Recommended patient start or continue some form of exercise and increase as tolerated to 30 minutes per day to facilitate weight loss and aid in control of BGs.  6.) Return to clinic in 1 months for follow up. She was explained the above plan and given opportunity to ask questions. Advised to call clinic with any further questions or concerns.    Tata Gleason, NP-C, CDE    A total of 60 minutes was spent in face to face time, of which over 50 % was spent in counseling patient on disease process, complications, treatment, and side effects of medications.

## 2017-08-11 ENCOUNTER — PATIENT MESSAGE (OUTPATIENT)
Dept: DIABETES | Facility: CLINIC | Age: 47
End: 2017-08-11

## 2017-08-16 ENCOUNTER — OFFICE VISIT (OUTPATIENT)
Dept: FAMILY MEDICINE | Facility: CLINIC | Age: 47
End: 2017-08-16
Payer: COMMERCIAL

## 2017-08-16 ENCOUNTER — LAB VISIT (OUTPATIENT)
Dept: LAB | Facility: HOSPITAL | Age: 47
End: 2017-08-16
Attending: FAMILY MEDICINE
Payer: COMMERCIAL

## 2017-08-16 VITALS
WEIGHT: 142.63 LBS | HEIGHT: 64 IN | HEART RATE: 88 BPM | BODY MASS INDEX: 24.35 KG/M2 | SYSTOLIC BLOOD PRESSURE: 117 MMHG | DIASTOLIC BLOOD PRESSURE: 79 MMHG

## 2017-08-16 DIAGNOSIS — E11.9 TYPE 2 DIABETES MELLITUS WITHOUT COMPLICATION, UNSPECIFIED LONG TERM INSULIN USE STATUS: Primary | ICD-10-CM

## 2017-08-16 DIAGNOSIS — I10 ESSENTIAL HYPERTENSION: ICD-10-CM

## 2017-08-16 DIAGNOSIS — E11.9 TYPE 2 DIABETES MELLITUS WITHOUT COMPLICATION, UNSPECIFIED LONG TERM INSULIN USE STATUS: ICD-10-CM

## 2017-08-16 LAB
CHOLEST/HDLC SERPL: 6.8 {RATIO}
HDL/CHOLESTEROL RATIO: 14.8 %
HDLC SERPL-MCNC: 359 MG/DL
HDLC SERPL-MCNC: 53 MG/DL
LDLC SERPL CALC-MCNC: 252.8 MG/DL
NONHDLC SERPL-MCNC: 306 MG/DL
TRIGL SERPL-MCNC: 266 MG/DL
TSH SERPL DL<=0.005 MIU/L-ACNC: 0.82 UIU/ML

## 2017-08-16 PROCEDURE — 83036 HEMOGLOBIN GLYCOSYLATED A1C: CPT

## 2017-08-16 PROCEDURE — 3008F BODY MASS INDEX DOCD: CPT | Mod: S$GLB,,, | Performed by: FAMILY MEDICINE

## 2017-08-16 PROCEDURE — 84443 ASSAY THYROID STIM HORMONE: CPT

## 2017-08-16 PROCEDURE — 86337 INSULIN ANTIBODIES: CPT

## 2017-08-16 PROCEDURE — 4010F ACE/ARB THERAPY RXD/TAKEN: CPT | Mod: S$GLB,,, | Performed by: FAMILY MEDICINE

## 2017-08-16 PROCEDURE — 3074F SYST BP LT 130 MM HG: CPT | Mod: S$GLB,,, | Performed by: FAMILY MEDICINE

## 2017-08-16 PROCEDURE — 80061 LIPID PANEL: CPT

## 2017-08-16 PROCEDURE — 3078F DIAST BP <80 MM HG: CPT | Mod: S$GLB,,, | Performed by: FAMILY MEDICINE

## 2017-08-16 PROCEDURE — 99214 OFFICE O/P EST MOD 30 MIN: CPT | Mod: S$GLB,,, | Performed by: FAMILY MEDICINE

## 2017-08-16 PROCEDURE — 86341 ISLET CELL ANTIBODY: CPT

## 2017-08-16 PROCEDURE — 99999 PR PBB SHADOW E&M-EST. PATIENT-LVL II: CPT | Mod: PBBFAC,,, | Performed by: FAMILY MEDICINE

## 2017-08-16 NOTE — PROGRESS NOTES
"The patient presents today to  new dx -200 fasting. Up to 350 pm    Past Medical History:  Past Medical History:   Diagnosis Date    Diabetes mellitus     Fibromyalgia     Hypertension     Scleroderma      Past Surgical History:   Procedure Laterality Date     SECTION      HYSTERECTOMY       Review of patient's allergies indicates:  No Known Allergies  Current Outpatient Prescriptions on File Prior to Visit   Medication Sig Dispense Refill    BD INSULIN PEN NEEDLE UF MINI 31 gauge x 3/16" Ndle       blood-glucose meter (BLOOD GLUCOSE MONITORING) kit Glucometer, strips, and lancets as covered by insurance. Use as instructed 1 each 0    duloxetine (CYMBALTA) 60 MG capsule Take 1 capsule (60 mg total) by mouth once daily. 90 capsule 3    LANTUS SOLOSTAR 100 unit/mL (3 mL) InPn pen 25 Units every evening.       lisinopril-hydrochlorothiazide (PRINZIDE,ZESTORETIC) 20-25 mg Tab TAKE 1 TABLET EVERY DAY 30 tablet 0    metformin (GLUCOPHAGE) 500 MG tablet Take 1 tablet (500 mg total) by mouth 2 (two) times daily with meals. 60 tablet 0    valacyclovir (VALTREX) 1000 MG tablet       zolpidem (AMBIEN) 5 MG Tab Take 1 tablet (5 mg total) by mouth nightly as needed. 90 tablet 1    [DISCONTINUED] mupirocin calcium 2% (BACTROBAN) 2 % cream Apply topically 3 (three) times daily. 15 g 1     No current facility-administered medications on file prior to visit.      Social History     Social History    Marital status:      Spouse name: N/A    Number of children: N/A    Years of education: N/A     Occupational History    Not on file.     Social History Main Topics    Smoking status: Current Every Day Smoker    Smokeless tobacco: Never Used    Alcohol use Yes      Comment: socially    Drug use: No    Sexual activity: Not on file     Other Topics Concern    Not on file     Social History Narrative    No narrative on file     Family History   Problem Relation Age of Onset    Heart disease " Mother     Hypertension Mother     Lupus Paternal Aunt          ROS:GENERAL: No fever, chills, fatigability or weight loss.  SKIN: No rashes, itching or changes in color or texture of skin.  HEAD: No headaches or recent head trauma.EYES: Visual acuity fine. No photophobia, ocular pain or diplopia.EARS: Denies ear pain, discharge or vertigo.NOSE: No loss of smell, no epistaxis or postnasal drip.MOUTH & THROAT: No hoarseness or change in voice. No excessive gum bleeding.NODES: Denies swollen glands.  CHEST: Denies NEGRO, cyanosis, wheezing, cough and sputum production.  CARDIOVASCULAR: Denies chest pain, PND, orthopnea or reduced exercise tolerance.  ABDOMEN: Appetite fine. No weight loss. Denies diarrhea, abdominal pain, hematemesis or blood in stool.  URINARY: No flank pain, dysuria or hematuria.  PERIPHERAL VASCULAR: No claudication or cyanosis.  MUSCULOSKELETAL: See above.  NEUROLOGIC: No history of seizures, paralysis, alteration of gait or coordination.  PE:   HEAD: Normocephalic, atraumatic.EYES: PERRL. EOMI.   EARS: TM's intact. Light reflex normal. No retraction or perforation.   NOSE: Mucosa pink. Airway clear.MOUTH & THROAT: No tonsillar enlargement. No pharyngeal erythema or exudate. No stridor.  NODES: No cervical, axillary or inguinal lymph node enlargement.  CHEST: Lungs clear to auscultation.  CARDIOVASCULAR: Normal S1, S2. No rubs, murmurs or gallops.  ABDOMEN: Bowel sounds normal. Not distended. Soft. No tenderness or masses.  MUSCULOSKELETAL: No palpable abnormality  NEUROLOGIC: Cranial Nerves: II-XII grossly intact.  Motor: 5/5 strength major flexors/extensors.  DTR's: Knees, Ankles 2+ and equal bilaterally; downgoing toes.  Sensory: Intact to light touch distally.  Gait & Posture: Normal gait and fine motion. No cerebellar signs.     Impression:DM   Plan:45 mioon ov >50% counseling-incr metformin 500 bid to 1000/500 then 1000/1000  Then beging incr lantus 3 units more daily until fbs <130

## 2017-08-17 LAB
ESTIMATED AVG GLUCOSE: ABNORMAL MG/DL
HBA1C MFR BLD HPLC: >14 %

## 2017-08-18 LAB — INSULIN AB SER-SCNC: 0 NMOL/L (ref 0–0.02)

## 2017-08-21 ENCOUNTER — PATIENT MESSAGE (OUTPATIENT)
Dept: FAMILY MEDICINE | Facility: CLINIC | Age: 47
End: 2017-08-21

## 2017-08-21 LAB — GAD65 AB SER-SCNC: 0 NMOL/L

## 2017-08-24 ENCOUNTER — PATIENT MESSAGE (OUTPATIENT)
Dept: FAMILY MEDICINE | Facility: CLINIC | Age: 47
End: 2017-08-24

## 2017-08-24 RX ORDER — METFORMIN HYDROCHLORIDE 500 MG/1
1000 TABLET ORAL 2 TIMES DAILY WITH MEALS
Qty: 120 TABLET | Refills: 6 | Status: SHIPPED | OUTPATIENT
Start: 2017-08-24 | End: 2017-08-31 | Stop reason: SDUPTHER

## 2017-08-31 ENCOUNTER — PATIENT MESSAGE (OUTPATIENT)
Dept: DIABETES | Facility: CLINIC | Age: 47
End: 2017-08-31

## 2017-08-31 ENCOUNTER — PATIENT MESSAGE (OUTPATIENT)
Dept: FAMILY MEDICINE | Facility: CLINIC | Age: 47
End: 2017-08-31

## 2017-08-31 RX ORDER — METFORMIN HYDROCHLORIDE 500 MG/1
TABLET ORAL
Qty: 150 TABLET | Refills: 6 | Status: SHIPPED | OUTPATIENT
Start: 2017-08-31 | End: 2017-11-08 | Stop reason: CLARIF

## 2017-08-31 NOTE — TELEPHONE ENCOUNTER
If no side effects incr metformin to 1500 q am but cont 1000 q pm  (change rx)Also Incr lantus 3 units and send numbers Tues pls

## 2017-09-12 ENCOUNTER — PATIENT MESSAGE (OUTPATIENT)
Dept: FAMILY MEDICINE | Facility: CLINIC | Age: 47
End: 2017-09-12

## 2017-09-13 NOTE — TELEPHONE ENCOUNTER
Rec incr januvia to 100mg q am -if getting readings <100 be prepared tpo reduce insulin -report next week pls

## 2017-09-14 DIAGNOSIS — E11.9 NEW ONSET TYPE 2 DIABETES MELLITUS: ICD-10-CM

## 2017-09-14 RX ORDER — BLOOD SUGAR DIAGNOSTIC
STRIP MISCELLANEOUS
Qty: 100 STRIP | Refills: 12 | Status: SHIPPED | OUTPATIENT
Start: 2017-09-14 | End: 2017-11-08 | Stop reason: CLARIF

## 2017-09-20 RX ORDER — PIOGLITAZONEHYDROCHLORIDE 30 MG/1
30 TABLET ORAL DAILY
Qty: 90 TABLET | Refills: 3 | Status: SHIPPED | OUTPATIENT
Start: 2017-09-20 | End: 2017-10-17

## 2017-09-20 NOTE — TELEPHONE ENCOUNTER
Rec add pioglitazone once a day-when we get better readings we may be able to decrease insulin and simplify regimen-send readings in 3 weeks

## 2017-09-28 ENCOUNTER — PATIENT MESSAGE (OUTPATIENT)
Dept: DIABETES | Facility: CLINIC | Age: 47
End: 2017-09-28

## 2017-10-15 ENCOUNTER — PATIENT MESSAGE (OUTPATIENT)
Dept: FAMILY MEDICINE | Facility: CLINIC | Age: 47
End: 2017-10-15

## 2017-10-16 DIAGNOSIS — F41.9 ANXIETY: ICD-10-CM

## 2017-10-16 DIAGNOSIS — M79.7 FIBROMYALGIA: ICD-10-CM

## 2017-10-17 ENCOUNTER — OFFICE VISIT (OUTPATIENT)
Dept: FAMILY MEDICINE | Facility: CLINIC | Age: 47
End: 2017-10-17
Payer: COMMERCIAL

## 2017-10-17 VITALS
DIASTOLIC BLOOD PRESSURE: 87 MMHG | WEIGHT: 140 LBS | HEART RATE: 77 BPM | BODY MASS INDEX: 23.9 KG/M2 | HEIGHT: 64 IN | TEMPERATURE: 98 F | SYSTOLIC BLOOD PRESSURE: 131 MMHG

## 2017-10-17 DIAGNOSIS — R51.9 INTRACTABLE HEADACHE, UNSPECIFIED CHRONICITY PATTERN, UNSPECIFIED HEADACHE TYPE: ICD-10-CM

## 2017-10-17 DIAGNOSIS — K59.00 CONSTIPATION, UNSPECIFIED CONSTIPATION TYPE: ICD-10-CM

## 2017-10-17 DIAGNOSIS — R11.0 NAUSEA: ICD-10-CM

## 2017-10-17 DIAGNOSIS — E11.9 TYPE 2 DIABETES MELLITUS WITHOUT COMPLICATION, UNSPECIFIED LONG TERM INSULIN USE STATUS: Primary | ICD-10-CM

## 2017-10-17 LAB — GLUCOSE SERPL-MCNC: 88 MG/DL (ref 70–110)

## 2017-10-17 PROCEDURE — 99999 PR PBB SHADOW E&M-EST. PATIENT-LVL IV: CPT | Mod: PBBFAC,,, | Performed by: NURSE PRACTITIONER

## 2017-10-17 PROCEDURE — 82948 REAGENT STRIP/BLOOD GLUCOSE: CPT | Mod: S$GLB,,, | Performed by: NURSE PRACTITIONER

## 2017-10-17 PROCEDURE — 99213 OFFICE O/P EST LOW 20 MIN: CPT | Mod: S$GLB,,, | Performed by: NURSE PRACTITIONER

## 2017-10-17 RX ORDER — BUTALBITAL, ACETAMINOPHEN AND CAFFEINE 50; 325; 40 MG/1; MG/1; MG/1
1 TABLET ORAL EVERY 8 HOURS PRN
Qty: 30 TABLET | Refills: 0 | Status: SHIPPED | OUTPATIENT
Start: 2017-10-17 | End: 2017-10-27

## 2017-10-17 RX ORDER — ONDANSETRON HYDROCHLORIDE 8 MG/1
8 TABLET, FILM COATED ORAL EVERY 8 HOURS PRN
Qty: 15 TABLET | Refills: 0 | Status: SHIPPED | OUTPATIENT
Start: 2017-10-17 | End: 2017-10-22

## 2017-10-17 RX ORDER — POLYETHYLENE GLYCOL 3350 17 G/17G
17 POWDER, FOR SOLUTION ORAL DAILY
Qty: 10 EACH | Refills: 0 | Status: SHIPPED | OUTPATIENT
Start: 2017-10-17 | End: 2017-10-27

## 2017-10-17 RX ORDER — DULOXETIN HYDROCHLORIDE 60 MG/1
60 CAPSULE, DELAYED RELEASE ORAL DAILY
Qty: 90 CAPSULE | Refills: 3 | Status: SHIPPED | OUTPATIENT
Start: 2017-10-17 | End: 2018-10-27 | Stop reason: SDUPTHER

## 2017-10-17 RX ORDER — DOCUSATE SODIUM 100 MG/1
100 CAPSULE, LIQUID FILLED ORAL 2 TIMES DAILY
Qty: 20 CAPSULE | Refills: 0 | Status: SHIPPED | OUTPATIENT
Start: 2017-10-17 | End: 2017-10-27

## 2017-10-17 NOTE — TELEPHONE ENCOUNTER
HA. Pt also states that her BG increase throughout the day, especially after meals (up to 200s at times). Currently taking trulicity, lantus, metformin. Is it OK with you if I put her on a sliding scale to give her better control.

## 2017-10-17 NOTE — PATIENT INSTRUCTIONS
Fioriet and sliding scale request sent to PCP for approval  Report to ER immediately if symptoms worsen

## 2017-10-18 ENCOUNTER — TELEPHONE (OUTPATIENT)
Dept: FAMILY MEDICINE | Facility: CLINIC | Age: 47
End: 2017-10-18

## 2017-10-18 ENCOUNTER — TELEPHONE (OUTPATIENT)
Dept: ENDOCRINOLOGY | Facility: CLINIC | Age: 47
End: 2017-10-18

## 2017-10-18 ENCOUNTER — PATIENT MESSAGE (OUTPATIENT)
Dept: FAMILY MEDICINE | Facility: CLINIC | Age: 47
End: 2017-10-18

## 2017-10-18 RX ORDER — INSULIN ASPART 100 [IU]/ML
1 INJECTION, SOLUTION INTRAVENOUS; SUBCUTANEOUS
Qty: 15 ML | Refills: 0 | Status: SHIPPED | OUTPATIENT
Start: 2017-10-18 | End: 2017-11-08

## 2017-10-18 NOTE — PROGRESS NOTES
Subjective:       Patient ID: Kylee Cha is a 47 y.o. female.    Chief Complaint: Headache; Blurred Vision; Nausea; and Hypertension    Headache    This is a recurrent problem. The current episode started more than 1 month ago. The problem occurs intermittently. The problem has been waxing and waning. The pain is located in the bilateral region. The pain does not radiate. The pain quality is similar to prior headaches (No HA at present). The quality of the pain is described as aching. The pain is moderate. Associated symptoms include blurred vision, nausea, phonophobia and photophobia. Pertinent negatives include no abdominal pain, abnormal behavior, anorexia, back pain, coughing, dizziness, drainage, ear pain, eye pain, eye redness, eye watering, facial sweating, fever, hearing loss, insomnia, loss of balance, muscle aches, neck pain, numbness, rhinorrhea, scalp tenderness, seizures, sinus pressure, sore throat, swollen glands, tingling, tinnitus, visual change, vomiting, weakness or weight loss. The symptoms are aggravated by bright light, noise and activity. She has tried NSAIDs for the symptoms. The treatment provided mild relief. Her past medical history is significant for hypertension and migraine headaches. There is no history of cancer, cluster headaches, immunosuppression, migraines in the family, obesity, pseudotumor cerebri, recent head traumas, sinus disease or TMJ. (Pt newly diagnosed diabetic (diagnosed in August); states diabetes meds have been altered multiple times, which has caused her HA to worsen. Last hgb A1C >14)   Constipation   This is a recurrent problem. The current episode started more than 1 month ago. The problem has been waxing and waning since onset. Her stool frequency is 2 to 3 times per week. The stool is described as firm. The patient is not on a high fiber diet. She does not exercise regularly. There has been adequate water intake. Associated symptoms include nausea.  "Pertinent negatives include no abdominal pain, anorexia, back pain, bloating, diarrhea, difficulty urinating, fecal incontinence, fever, flatus, hematochezia, hemorrhoids, melena, rectal pain, vomiting or weight loss. Risk factors include change in medication usage/dosage and dietary change. She has tried nothing for the symptoms. The treatment provided no relief. There is no history of abdominal surgery, endocrine disease, inflammatory bowel disease, irritable bowel syndrome, metabolic disease, neurologic disease, neuromuscular disease, psychiatric history or radiation treatment. (Pt newly diagnosed diabetic (diagnosed in August); states diabetes meds have been altered multiple times, which has caused her HA to worsen. Last hgb A1C >14)   Pt states that her diabetes medications have been altered multiple times since diagnosis. She sees endocrinology, who has her taking metformin, trulicity, lantus; states BG "jump up" during the day, especially after meals. Pt states BG 90-110s in the AM and increase to as high as 200s during the day. Pt states avoids carbohydrates and adhering to diet discussed with dietician and diabetic education. Pt states PCP and diabetic educator recommended different treatment plans. Pt informed that addition of sliding scale will be discussed with PCP. BG 88 at visit today. Pt also states not pleased with current endocrinologist ant would like referral to another. Pt has no other complaints today.  Past Medical History:   Diagnosis Date    Diabetes mellitus     Fibromyalgia     Hypertension     Scleroderma      Social History     Social History    Marital status:      Spouse name: N/A    Number of children: N/A    Years of education: N/A     Occupational History         Social History Main Topics    Smoking status: Current Every Day Smoker    Smokeless tobacco: Never Used    Alcohol use Yes      Comment: socially    Drug use: No    Sexual activity: Not on file     Past " Surgical History:   Procedure Laterality Date     SECTION      HYSTERECTOMY         Review of Systems   Constitutional: Negative.  Negative for fever and weight loss.   HENT: Negative.  Negative for ear pain, hearing loss, rhinorrhea, sinus pressure, sore throat and tinnitus.    Eyes: Positive for blurred vision and photophobia. Negative for pain and redness.   Respiratory: Negative.  Negative for cough.    Cardiovascular: Negative.    Gastrointestinal: Positive for constipation and nausea. Negative for abdominal pain, anorexia, bloating, diarrhea, flatus, hematochezia, hemorrhoids, melena, rectal pain and vomiting.   Endocrine: Negative.    Genitourinary: Negative.  Negative for difficulty urinating.   Musculoskeletal: Negative.  Negative for back pain and neck pain.   Skin: Negative.    Allergic/Immunologic: Negative.    Neurological: Negative.  Negative for dizziness, tingling, seizures, weakness, numbness and loss of balance.   Psychiatric/Behavioral: Negative.  The patient does not have insomnia.        Objective:      Physical Exam   Constitutional: She is oriented to person, place, and time. She appears well-developed and well-nourished.   HENT:   Head: Normocephalic.   Right Ear: External ear normal.   Left Ear: External ear normal.   Nose: Nose normal.   Mouth/Throat: Oropharynx is clear and moist.   Eyes: Conjunctivae are normal. Pupils are equal, round, and reactive to light.   Neck: Normal range of motion. Neck supple.   Cardiovascular: Normal rate, regular rhythm and normal heart sounds.    Pulmonary/Chest: Effort normal and breath sounds normal.   Abdominal: Soft. Bowel sounds are normal.   Musculoskeletal: Normal range of motion.   Neurological: She is alert and oriented to person, place, and time.   Skin: Skin is warm and dry. Capillary refill takes 2 to 3 seconds.   Psychiatric: She has a normal mood and affect. Her behavior is normal. Judgment and thought content normal.   Nursing note  and vitals reviewed.      Assessment:       1. Type 2 diabetes mellitus without complication, unspecified long term insulin use status    2. Intractable headache, unspecified chronicity pattern, unspecified headache type    3. Nausea    4. Constipation, unspecified constipation type        Plan:           Kylee was seen today for headache, blurred vision, nausea and hypertension.    Diagnoses and all orders for this visit:    Type 2 diabetes mellitus without complication, unspecified long term insulin use status  -     POCT Glucose  -     Ambulatory referral to Endocrinology        Sliding scale request sent to PCP for approval        Continue current medications    Intractable headache, unspecified chronicity pattern, unspecified headache type  Nausea  -     ondansetron (ZOFRAN) 8 MG tablet; Take 1 tablet (8 mg total) by mouth every 8 (eight) hours as needed.  Fioriet request sent to PCP for approval    Constipation, unspecified constipation type  -     polyethylene glycol (GLYCOLAX) 17 gram PwPk; Take 17 g by mouth once daily.  -     docusate sodium (COLACE) 100 MG capsule; Take 1 capsule (100 mg total) by mouth 2 (two) times daily.    Report to ER immediately if symptoms worsen

## 2017-10-18 NOTE — TELEPHONE ENCOUNTER
Instructed pt that her sliding scale insulin is only for PRN usage, if BG is not control w/ current medications. Pt states she has clarification of the medication ordered. Instructed her to call if she has any additional questions.

## 2017-10-18 NOTE — TELEPHONE ENCOUNTER
----- Message from Anamaria Samuels sent at 10/18/2017 10:45 AM CDT -----  Contact: pt  The pt request a call concerning a medication she is taking, pt can be reached at [email or web address]

## 2017-10-18 NOTE — TELEPHONE ENCOUNTER
----- Message from Mishel Gaxiola sent at 10/17/2017  4:24 PM CDT -----  Contact: self  Patient needs first available appointment due to A1C was 18.6, blood sugar is fluctuating between 90 to has high as 280. Patient is currently on Trulicity 0.75 mg once weekly and Lantus 25 units every night. Patient is being referred by Dr Dirk Benjamin. Please call patient at [phone]. Thanks!

## 2017-10-18 NOTE — TELEPHONE ENCOUNTER
Please call this pt and inform her the Dr. Benjamin approved the sliding scale. Rx printed. Please call or fax to pharmacy. Also have her  a copy.

## 2017-10-18 NOTE — TELEPHONE ENCOUNTER
Rx sent to pharmacy via fax. Called pt and left a vm regard rx approval. Copy available for pt to .

## 2017-10-18 NOTE — TELEPHONE ENCOUNTER
----- Message from Adriane Fenton sent at 10/18/2017  1:21 PM CDT -----  Contact: self   Patient would like to consult with nurse regarding new medication. Please call back at [phone].        Thanks,  Adriane Fenton

## 2017-10-18 NOTE — TELEPHONE ENCOUNTER
----- Message from Maddy Amor MA sent at 10/18/2017  4:15 PM CDT -----  Did you call her for anything?  ----- Message -----  From: Anna Peñaloza  Sent: 10/18/2017   4:02 PM  To: Cassidy BENOIT Staff    Pt at [phone]//states she is returning your call//please call again//thanks/quinn

## 2017-10-18 NOTE — TELEPHONE ENCOUNTER
Spoke with pt, states she only has Diabetes, offered a sooner appt with the NP, pt accepted, appt made for [facility], pt aware of date, time and location

## 2017-10-20 ENCOUNTER — OFFICE VISIT (OUTPATIENT)
Dept: FAMILY MEDICINE | Facility: CLINIC | Age: 47
End: 2017-10-20
Payer: COMMERCIAL

## 2017-10-20 VITALS
HEART RATE: 66 BPM | SYSTOLIC BLOOD PRESSURE: 132 MMHG | WEIGHT: 140 LBS | BODY MASS INDEX: 23.9 KG/M2 | DIASTOLIC BLOOD PRESSURE: 83 MMHG | HEIGHT: 64 IN | TEMPERATURE: 98 F

## 2017-10-20 DIAGNOSIS — E11.9 TYPE 2 DIABETES MELLITUS WITHOUT COMPLICATION, UNSPECIFIED LONG TERM INSULIN USE STATUS: Primary | ICD-10-CM

## 2017-10-20 PROCEDURE — 99999 PR PBB SHADOW E&M-EST. PATIENT-LVL II: CPT | Mod: PBBFAC,,, | Performed by: FAMILY MEDICINE

## 2017-10-20 PROCEDURE — 99214 OFFICE O/P EST MOD 30 MIN: CPT | Mod: S$GLB,,, | Performed by: FAMILY MEDICINE

## 2017-10-20 RX ORDER — LISINOPRIL 20 MG/1
20 TABLET ORAL DAILY
COMMUNITY
End: 2018-09-20 | Stop reason: SDUPTHER

## 2017-10-20 NOTE — PROGRESS NOTES
"The patient presents today to fu recent  dx DM 1-120 fasting. PP up to 170  pm    Past Medical History:  Past Medical History:   Diagnosis Date    Diabetes mellitus     Fibromyalgia     Hypertension     Scleroderma      Past Surgical History:   Procedure Laterality Date     SECTION      HYSTERECTOMY       Review of patient's allergies indicates:  No Known Allergies  Current Outpatient Prescriptions on File Prior to Visit   Medication Sig Dispense Refill    ACCU-CHEK ANDREINA PLUS TEST STRP Strp USE AS DIRECTED TO TEST BLOOD SUGAR DAILY 100 strip 12    ATORVASTATIN CALCIUM (LIPITOR ORAL) Take by mouth.      BD INSULIN PEN NEEDLE UF MINI 31 gauge x 3/16" Ndle       butalbital-acetaminophen-caffeine -40 mg (FIORICET, ESGIC) -40 mg per tablet Take 1 tablet by mouth every 8 (eight) hours as needed for Pain. 30 tablet 0    docusate sodium (COLACE) 100 MG capsule Take 1 capsule (100 mg total) by mouth 2 (two) times daily. 20 capsule 0    dulaglutide (TRULICITY) 0.75 mg/0.5 mL PnIj Inject 0.75 mg into the skin every 7 days.      duloxetine (CYMBALTA) 60 MG capsule Take 1 capsule (60 mg total) by mouth once daily. 90 capsule 3    insulin aspart (NOVOLOG) 100 unit/mL InPn pen Inject 1 Units into the skin as needed. Use insulin according to sliding scale only. Sliding scale as follows:  BG: <150= 0 units           150-199= 2 u           200-249= 4 u           250-299= 6 u           300-349= 8 u           > or = 350 = 10u and call MD 15 mL 0    LANTUS SOLOSTAR 100 unit/mL (3 mL) InPn pen 25 Units every evening.       metformin (GLUCOPHAGE) 500 MG tablet Take (3) 500mg in the am and (2) 500mg in the pm (Patient taking differently: 1,000 mg daily with breakfast. Take (3) 500mg in the am and (2) 500mg in the pm) 150 tablet 6    ondansetron (ZOFRAN) 8 MG tablet Take 1 tablet (8 mg total) by mouth every 8 (eight) hours as needed. 15 tablet 0    polyethylene glycol (GLYCOLAX) 17 gram PwPk Take 17 g " by mouth once daily. 10 each 0    valacyclovir (VALTREX) 1000 MG tablet       [DISCONTINUED] lisinopril-hydrochlorothiazide (PRINZIDE,ZESTORETIC) 20-25 mg Tab TAKE 1 TABLET EVERY DAY 30 tablet 0     No current facility-administered medications on file prior to visit.      Social History     Social History    Marital status:      Spouse name: N/A    Number of children: N/A    Years of education: N/A     Occupational History    Not on file.     Social History Main Topics    Smoking status: Current Every Day Smoker    Smokeless tobacco: Never Used    Alcohol use Yes      Comment: socially    Drug use: No    Sexual activity: Not on file     Other Topics Concern    Not on file     Social History Narrative    No narrative on file     Family History   Problem Relation Age of Onset    Heart disease Mother     Hypertension Mother     Lupus Paternal Aunt          ROS:GENERAL: No fever, chills, fatigability or weight loss.  SKIN: No rashes, itching or changes in color or texture of skin.  HEAD: No headaches or recent head trauma.EYES: Visual acuity fine. No photophobia, ocular pain or diplopia.EARS: Denies ear pain, discharge or vertigo.NOSE: No loss of smell, no epistaxis or postnasal drip.MOUTH & THROAT: No hoarseness or change in voice. No excessive gum bleeding.NODES: Denies swollen glands.  CHEST: Denies NEGRO, cyanosis, wheezing, cough and sputum production.  CARDIOVASCULAR: Denies chest pain, PND, orthopnea or reduced exercise tolerance.  ABDOMEN: Appetite fine. No weight loss. Denies diarrhea, abdominal pain, hematemesis or blood in stool.  URINARY: No flank pain, dysuria or hematuria.  PERIPHERAL VASCULAR: No claudication or cyanosis.  MUSCULOSKELETAL: See above.  NEUROLOGIC: No history of seizures, paralysis, alteration of gait or coordination.  PE:   HEAD: Normocephalic, atraumatic.EYES: PERRL. EOMI.   EARS: TM's intact. Light reflex normal. No retraction or perforation.   NOSE: Mucosa pink.  Airway clear.MOUTH & THROAT: No tonsillar enlargement. No pharyngeal erythema or exudate. No stridor.  NODES: No cervical, axillary or inguinal lymph node enlargement.  CHEST: Lungs clear to auscultation.  CARDIOVASCULAR: Normal S1, S2. No rubs, murmurs or gallops.  ABDOMEN: Bowel sounds normal. Not distended. Soft. No tenderness or masses.  MUSCULOSKELETAL: No palpable abnormality  NEUROLOGIC: Cranial Nerves: II-XII grossly intact.  Motor: 5/5 strength major flexors/extensors.  DTR's: Knees, Ankles 2+ and equal bilaterally; downgoing toes.  Sensory: Intact to light touch distally.  Gait & Posture: Normal gait and fine motion. No cerebellar signs.     Impression:DM   Plan:45 min ov >50% counseling ok to hold novolog and give jardiance a few days(recently reduced metformin when added jardiance 1-2 d ago) Cont lantus and trulicity

## 2017-10-22 ENCOUNTER — PATIENT MESSAGE (OUTPATIENT)
Dept: FAMILY MEDICINE | Facility: CLINIC | Age: 47
End: 2017-10-22

## 2017-10-23 RX ORDER — GABAPENTIN 300 MG/1
300 CAPSULE ORAL NIGHTLY
Qty: 30 CAPSULE | Refills: 11 | Status: SHIPPED | OUTPATIENT
Start: 2017-10-23 | End: 2018-02-03

## 2017-11-01 ENCOUNTER — PATIENT OUTREACH (OUTPATIENT)
Dept: ADMINISTRATIVE | Facility: HOSPITAL | Age: 47
End: 2017-11-01

## 2017-11-01 DIAGNOSIS — E11.9 TYPE 2 DIABETES MELLITUS WITHOUT COMPLICATION, UNSPECIFIED LONG TERM INSULIN USE STATUS: ICD-10-CM

## 2017-11-01 DIAGNOSIS — E55.9 VITAMIN D DEFICIENCY DISEASE: Primary | ICD-10-CM

## 2017-11-02 ENCOUNTER — OUTPATIENT CASE MANAGEMENT (OUTPATIENT)
Dept: ADMINISTRATIVE | Facility: OTHER | Age: 47
End: 2017-11-02

## 2017-11-02 NOTE — PROGRESS NOTES
Please note the following patient has been assigned to Yoseph Daniel RN in Outpatient Case Management for Diabetes Disease Management with a hbA1C greater than 10.0.    Please contact [facility] at [phone]. [phone] with any questions.    Thank you,  Krystal Snyder

## 2017-11-08 ENCOUNTER — LAB VISIT (OUTPATIENT)
Dept: LAB | Facility: HOSPITAL | Age: 47
End: 2017-11-08
Attending: NURSE PRACTITIONER
Payer: COMMERCIAL

## 2017-11-08 ENCOUNTER — OFFICE VISIT (OUTPATIENT)
Dept: ENDOCRINOLOGY | Facility: CLINIC | Age: 47
End: 2017-11-08
Payer: COMMERCIAL

## 2017-11-08 VITALS
HEIGHT: 64 IN | BODY MASS INDEX: 22.9 KG/M2 | HEART RATE: 101 BPM | SYSTOLIC BLOOD PRESSURE: 110 MMHG | WEIGHT: 134.13 LBS | DIASTOLIC BLOOD PRESSURE: 80 MMHG

## 2017-11-08 DIAGNOSIS — E11.9 TYPE 2 DIABETES MELLITUS WITHOUT COMPLICATION, UNSPECIFIED LONG TERM INSULIN USE STATUS: ICD-10-CM

## 2017-11-08 DIAGNOSIS — I10 ESSENTIAL HYPERTENSION: ICD-10-CM

## 2017-11-08 DIAGNOSIS — E11.9 TYPE 2 DIABETES MELLITUS WITHOUT COMPLICATION, UNSPECIFIED LONG TERM INSULIN USE STATUS: Primary | ICD-10-CM

## 2017-11-08 DIAGNOSIS — E78.2 MIXED HYPERLIPIDEMIA: ICD-10-CM

## 2017-11-08 LAB
ALBUMIN SERPL BCP-MCNC: 3.6 G/DL
ALP SERPL-CCNC: 71 U/L
ALT SERPL W/O P-5'-P-CCNC: 19 U/L
ANION GAP SERPL CALC-SCNC: 10 MMOL/L
AST SERPL-CCNC: 12 U/L
BILIRUB SERPL-MCNC: 0.3 MG/DL
BUN SERPL-MCNC: 15 MG/DL
CALCIUM SERPL-MCNC: 10 MG/DL
CHLORIDE SERPL-SCNC: 105 MMOL/L
CHOLEST SERPL-MCNC: 219 MG/DL
CHOLEST/HDLC SERPL: 4.6 {RATIO}
CO2 SERPL-SCNC: 25 MMOL/L
CREAT SERPL-MCNC: 0.7 MG/DL
EST. GFR  (AFRICAN AMERICAN): >60 ML/MIN/1.73 M^2
EST. GFR  (NON AFRICAN AMERICAN): >60 ML/MIN/1.73 M^2
ESTIMATED AVG GLUCOSE: 134 MG/DL
GLUCOSE SERPL-MCNC: 97 MG/DL
HBA1C MFR BLD HPLC: 6.3 %
HDLC SERPL-MCNC: 48 MG/DL
HDLC SERPL: 21.9 %
LDLC SERPL CALC-MCNC: 140 MG/DL
NONHDLC SERPL-MCNC: 171 MG/DL
POTASSIUM SERPL-SCNC: 3.8 MMOL/L
PROT SERPL-MCNC: 7.8 G/DL
SODIUM SERPL-SCNC: 140 MMOL/L
TRIGL SERPL-MCNC: 155 MG/DL

## 2017-11-08 PROCEDURE — 80061 LIPID PANEL: CPT

## 2017-11-08 PROCEDURE — 83036 HEMOGLOBIN GLYCOSYLATED A1C: CPT

## 2017-11-08 PROCEDURE — 99214 OFFICE O/P EST MOD 30 MIN: CPT | Mod: S$GLB,,, | Performed by: NURSE PRACTITIONER

## 2017-11-08 PROCEDURE — 80053 COMPREHEN METABOLIC PANEL: CPT

## 2017-11-08 PROCEDURE — 36415 COLL VENOUS BLD VENIPUNCTURE: CPT | Mod: PO

## 2017-11-08 PROCEDURE — 99999 PR PBB SHADOW E&M-EST. PATIENT-LVL IV: CPT | Mod: PBBFAC,,, | Performed by: NURSE PRACTITIONER

## 2017-11-08 RX ORDER — ROSUVASTATIN CALCIUM 5 MG/1
5 TABLET, COATED ORAL DAILY
Qty: 30 TABLET | Refills: 6 | Status: CANCELLED | OUTPATIENT
Start: 2017-11-08 | End: 2018-11-08

## 2017-11-08 RX ORDER — LANCETS 33 GAUGE
1 EACH MISCELLANEOUS 4 TIMES DAILY
COMMUNITY

## 2017-11-08 RX ORDER — METFORMIN HYDROCHLORIDE 1000 MG/1
1000 TABLET ORAL 2 TIMES DAILY WITH MEALS
Qty: 60 TABLET | Refills: 6 | Status: SHIPPED | OUTPATIENT
Start: 2017-11-08 | End: 2017-11-28 | Stop reason: SDUPTHER

## 2017-11-08 RX ORDER — METFORMIN HYDROCHLORIDE 1000 MG/1
1000 TABLET ORAL
COMMUNITY
End: 2017-11-08

## 2017-11-08 NOTE — LETTER
November 8, 2017      [place]  [address]  [place] [address]           [facility]  [address] [facility] [address]  [place] [address]  Phone: [phone]  Fax: [phone]          Patient: Kylee Cha   MR Number: [id number]   YOB: 1970   Date of Visit: 11/8/2017       Dear Joyce Salgado:    Thank you for referring Kylee Cha to me for evaluation. Attached you will find relevant portions of my assessment and plan of care.    If you have questions, please do not hesitate to call me. I look forward to following Kylee Cha along with you.    Sincerely,    BISHOP Palomares,JESÚS-C    Enclosure  CC:  No Recipients    If you would like to receive this communication electronically, please contact [email or web address] or [phone] to request more information on [employer] Link access.    For providers and/or their staff who would like to refer a patient to [facility], please contact us through our one-stop-shop provider referral line, [facility], at [phone].    If you feel you have received this communication in error or would no longer like to receive these types of communications, please e-mail [email or web address]

## 2017-11-08 NOTE — PROGRESS NOTES
CC: Ms. Kylee Cha arrives today for management of Type 2 DM and review of chronic medical conditions, as listed in the Visit Diagnosis section of this encounter.       HPI: Ms. Kylee Cha was diagnosed with Type 2 DM in 8/2017, after having recurrent yeast infections. A1c > 14% upon workup and she went to ER at time of diagnosis for glucose >600. C-peptide and DESMOND ab were normal. Initial treatment consisted of insulin and metformin. Jardiance and Trulicity were added soon after. + FH of DM in maternal GF. Denies hospitalizations due to DM.     This is her first time being seen in endocrine.     Her PCP is Dr. Benjamin, who has been assisting her with DM medication adjustments. She also saw Tata Gleason NP, DANNIE in [facility].  She reports she was taking metformin 2500 mg daily but this was decreased when Jardiance and Trulicity were added.     She reports that a day or two before her next Trulicity dose is due, her blood sugars may increase.     BG readings are checked 3-4x/day (fasting and 2 hours after meals)            Hypoglycemia: No    Missing Insulin/PO medication doses: No    Exercise: Yes, started using stationary bike. 20 minutes 5 days/week.    Dietary Habits: Eats 2 meals/day. Mid morning snack is protein shake. Afternoon snack - 5 martín toast with cheese. Avoids sugary beverages. .    Last DM education appointment: never    Regarding hyperlipidemia, she stopped taking atorvastatin due to headaches.       CURRENT DIABETIC MEDS: metformin 1000 mg QAM, Jardiance 25 mg daily, Trulicity 0.75 mg weekly, Lantus 25 units QHS, Novolog SSI only (not needing)  Vial or pen: pens  Glucometer type: [employer] (bought online)    Previous DM treatments:  Januvia - not effective    Last Eye Exam: 2017. Due for dilated eye exam.   Last Podiatry Exam: n/a    REVIEW OF SYSTEMS  Constitutional: no c/o fatigue, weakness, + weight loss over the past few months.   Eyes: denies  "visual disturbances.  ENT: denies dysphagia, hearing loss  Cardiac: no palpitations or chest pain.  Respiratory: no cough or dyspnea.  GI: no c/o abdominal pain or nausea. + constipation. Denies h/o pancreatitis.   : denies urinary frequency, burning, discharge, frequent UTIs  Skin: no lesions or rashes.  Musculoskeletal: denies difficulty mobilizing, denies muscle or joint pains. + leg pain at night that occurs at rest. Resolves with stretching.   Neuro: no numbness, tingling, or parasthesias.  Endocrine: denies polyphagia, polydipsia, polyuria.      Personally reviewed Past Medical, Surgical, Social History.    Vital Signs  /80   Pulse 101   Ht 5' 4" (1.626 m)   Wt 60.9 kg (134 lb 2.4 oz)   BMI 23.03 kg/m²     Personally reviewed the below labs:    Hemoglobin A1C   Date Value Ref Range Status   08/16/2017 >14.0 (H) 4.0 - 5.6 % Final     Comment:     According to ADA guidelines, hemoglobin A1c <7.0% represents  optimal control in non-pregnant diabetic patients. Different  metrics may apply to specific patient populations.   Standards of Medical Care in Diabetes-2016.  For the purpose of screening for the presence of diabetes:  <5.7%     Consistent with the absence of diabetes  5.7-6.4%  Consistent with increasing risk for diabetes   (prediabetes)  >or=6.5%  Consistent with diabetes  Currently, no consensus exists for use of hemoglobin A1c  for diagnosis of diabetes for children.  This Hemoglobin A1c assay has significant interference with fetal   hemoglobin   (HbF). The results are invalid for patients with abnormal amounts of   HbF,   including those with known Hereditary Persistence   of Fetal Hemoglobin. Heterozygous hemoglobin variants (HbAS, HbAC,   HbAD, HbAE, HbA2) do not significantly interfere with this assay;   however, presence of multiple variants in a sample may impact the %   interference.         Chemistry    No results found for: NA, K, CL, CO2, BUN, CREATININE, GLU No results found for: " CALCIUM, ALKPHOS, AST, ALT, BILITOT, ESTGFRAFRICA, EGFRNONAA       Lab Results   Component Value Date    CHOL 359 (H) 08/16/2017     Lab Results   Component Value Date    HDL 53 08/16/2017     Lab Results   Component Value Date    LDLCALC 252.8 (H) 08/16/2017     Lab Results   Component Value Date    TRIG 266 (H) 08/16/2017     Lab Results   Component Value Date    CHOLHDL 14.8 (L) 08/16/2017       No results found for: MICALBCREAT  Lab Results   Component Value Date    TSH 0.825 08/16/2017       CrCl cannot be calculated (No order found.).    Vit D, 25-Hydroxy   Date Value Ref Range Status   03/29/2014 27 (L) 30 - 96 ng/mL Final     Comment:     Vitamin D deficiency.........<10 ng/mL                              Vitamin D insufficiency......10-29 ng/mL       Vitamin D sufficiency........> or equal to 30 ng/mL  Vitamin D toxicity............>100 ng/mL        Ref. Range 8/7/2017 09:43   C-Peptide Latest Ref Range: 0.78 - 5.19 ng/mL 3.17      Ref. Range 8/16/2017 10:40   Glutamic Acid Decarb Ab Latest Ref Range: <=0.02 nmol/L 0.00       PHYSICAL EXAMINATION  Constitutional: Appears well, no distress  Neck: Supple, trachea midline; no thyromegaly or nodules.   Respiratory: CTA, even and unlabored.  Cardiovascular: RRR, no murmurs, no carotid bruits. DP pulses  2+ bilaterally; no edema.    GI: bowel sounds active, no hernia noted  Lymph: no cervical or supraclavicular lymphadenopathy  Skin: warm and dry; no lipohypertrophy, or acanthosis nigracans observed.  Psych: normal affect, pleasant mood, conversant  Musculoskeletal: steady gait, no clubbing, full ROM to all extremities  Neuro: DTR 2+ BUE/2+BLE.  Feet: appropriate footwear.      Assessment/Plan  1. Type 2 diabetes mellitus without complication, unspecified long term insulin use status  -- Glucoses well controlled on logs. A1c, lipid panel, CMP today.   -- In an effort to work off of insulin, increase metformin to 1000 mg BID  -- discontinue Lantus and Novolog  SSI  -- continue Trulicity and Jardiance.   -- check BG 2x/day and notify me of FBG >130 consistently. Would likely increase Trulicity before resuming basal insulin.  Ambulatory Referral to Diabetes Education for new onset DM, diet  Ambulatory referral to Ophthalmology    -- Discussed diagnosis of DM, A1c goals, progression of disease, long term complications and tx options.    2. Essential hypertension  -- controlled today  -- continue lisinopril   3. Mixed hyperlipidemia  -- uncontrolled. Discussed adding a different statin after lab work. Explained importance of LDL control.   -- Lipid panel today.       FOLLOW UP  Return in about 3 months (around 2/8/2018).   Patient instructed to bring BG logs to each follow up   Patient encouraged to call for any BG/medication issues, concerns, or questions.    Orders Placed This Encounter   Procedures    Hemoglobin A1c    Lipid panel    Comprehensive metabolic panel    Ambulatory referral to Ophthalmology    Ambulatory Referral to Diabetes Education

## 2017-11-09 ENCOUNTER — PATIENT MESSAGE (OUTPATIENT)
Dept: FAMILY MEDICINE | Facility: CLINIC | Age: 47
End: 2017-11-09

## 2017-11-09 ENCOUNTER — TELEPHONE (OUTPATIENT)
Dept: ENDOCRINOLOGY | Facility: CLINIC | Age: 47
End: 2017-11-09

## 2017-11-09 DIAGNOSIS — E78.2 MIXED HYPERLIPIDEMIA: Primary | ICD-10-CM

## 2017-11-09 RX ORDER — PRAVASTATIN SODIUM 20 MG/1
20 TABLET ORAL NIGHTLY
Qty: 30 TABLET | Refills: 6 | Status: SHIPPED | OUTPATIENT
Start: 2017-11-09 | End: 2018-06-19 | Stop reason: SDUPTHER

## 2017-11-09 NOTE — TELEPHONE ENCOUNTER
Left message. Will send pravastatin 20 mg to her pharmacy. Advised her to notify me if having any muscle pain, weakness, etc      Please add AST, ALT, lipid panel to February lab work

## 2017-11-10 ENCOUNTER — PATIENT MESSAGE (OUTPATIENT)
Dept: ENDOCRINOLOGY | Facility: CLINIC | Age: 47
End: 2017-11-10

## 2017-11-13 ENCOUNTER — PATIENT MESSAGE (OUTPATIENT)
Dept: ENDOCRINOLOGY | Facility: CLINIC | Age: 47
End: 2017-11-13

## 2017-11-20 ENCOUNTER — OUTPATIENT CASE MANAGEMENT (OUTPATIENT)
Dept: ADMINISTRATIVE | Facility: OTHER | Age: 47
End: 2017-11-20

## 2017-11-20 NOTE — PROGRESS NOTES
11/20/17- 1st Attempt to complete initial assessment for OPCM. Called patient at [phone] with no answer. Message left requesting return call. Will attempt to contact at a later date.

## 2017-11-24 ENCOUNTER — OUTPATIENT CASE MANAGEMENT (OUTPATIENT)
Dept: ADMINISTRATIVE | Facility: OTHER | Age: 47
End: 2017-11-24

## 2017-11-24 NOTE — PROGRESS NOTES
11/24/17- 2nd Attempt to complete initial assessment for OPCM. Called patient at [phone] with no answer. Message left requesting return call. Will attempt to contact at a later date.

## 2017-11-26 ENCOUNTER — PATIENT MESSAGE (OUTPATIENT)
Dept: FAMILY MEDICINE | Facility: CLINIC | Age: 47
End: 2017-11-26

## 2017-11-26 ENCOUNTER — PATIENT MESSAGE (OUTPATIENT)
Dept: ENDOCRINOLOGY | Facility: CLINIC | Age: 47
End: 2017-11-26

## 2017-11-27 ENCOUNTER — OUTPATIENT CASE MANAGEMENT (OUTPATIENT)
Dept: ADMINISTRATIVE | Facility: OTHER | Age: 47
End: 2017-11-27

## 2017-11-27 RX ORDER — ONDANSETRON 8 MG/1
8 TABLET, ORALLY DISINTEGRATING ORAL EVERY 8 HOURS PRN
Qty: 15 TABLET | Refills: 4 | Status: SHIPPED | OUTPATIENT
Start: 2017-11-27 | End: 2019-02-07 | Stop reason: SDUPTHER

## 2017-11-27 NOTE — PROGRESS NOTES
11/27/17-3rd attempt to complete Initial Assessment for OPCM. RN CM will mail patient letter with contact information for OPCM requesting return call.

## 2017-11-27 NOTE — LETTER
November 27, 2017    Kylee Cha  [address]  [place] [address]             [facility] [address] [place] [address] Dear Mrs. Cha,        My name is Yoseph Millet. I work with [facility] Outpatient Case Management Department. We received a referral to call you to discuss your medical history. These services are free of charge and are offered to [facility] patients who have recently been discharged from any of our facilities or who have complex medical conditions that may require skill of a nurse to assist with management.    I am a Registered Nurse who specializes in connecting patients with available medical and financial resources as well as addressing any educational needs that may be indicated.    I attempted to reach you by telephone, but I was unsuccessful. Please call our department so that we can go over some questions regarding your health.     The Outpatient Case Management Department can be reached at [phone] from 8:00AM to 4:30PM on Monday thru Friday. [facility] also has a program where a nurse is available 24/7 to answer questions or provide medical advice: their number is [phone].      Thank You,      Yoseph Daniel, RN  Outpatient Case Management

## 2017-11-28 DIAGNOSIS — E11.9 TYPE 2 DIABETES MELLITUS WITHOUT COMPLICATION, UNSPECIFIED LONG TERM INSULIN USE STATUS: ICD-10-CM

## 2017-11-28 RX ORDER — METFORMIN HYDROCHLORIDE 1000 MG/1
500 TABLET ORAL 2 TIMES DAILY WITH MEALS
Qty: 60 TABLET | Refills: 6
Start: 2017-11-28 | End: 2017-12-06 | Stop reason: SDUPTHER

## 2017-12-05 ENCOUNTER — PATIENT MESSAGE (OUTPATIENT)
Dept: FAMILY MEDICINE | Facility: CLINIC | Age: 47
End: 2017-12-05

## 2017-12-06 DIAGNOSIS — E11.9 TYPE 2 DIABETES MELLITUS WITHOUT COMPLICATION, UNSPECIFIED LONG TERM INSULIN USE STATUS: ICD-10-CM

## 2017-12-06 RX ORDER — METFORMIN HYDROCHLORIDE 500 MG/1
500 TABLET ORAL 2 TIMES DAILY WITH MEALS
Qty: 60 TABLET | Refills: 11 | Status: SHIPPED | OUTPATIENT
Start: 2017-12-06 | End: 2018-06-04

## 2017-12-06 NOTE — TELEPHONE ENCOUNTER
Since her a1c was so greatly improved it could be some residual nerve irritation from when the sugar was so high in which case I would just wait a few weeks and see what happens. It 's also possible it's unrelated like a foot injury or a neuralgia from a low back problem but she would likely have other symptoms. If she would like me to check her -ok Friday  Also see if she needs help getting her Eye check set up

## 2018-01-14 ENCOUNTER — PATIENT MESSAGE (OUTPATIENT)
Dept: FAMILY MEDICINE | Facility: CLINIC | Age: 48
End: 2018-01-14

## 2018-01-29 ENCOUNTER — PATIENT MESSAGE (OUTPATIENT)
Dept: FAMILY MEDICINE | Facility: CLINIC | Age: 48
End: 2018-01-29

## 2018-02-02 ENCOUNTER — TELEPHONE (OUTPATIENT)
Dept: FAMILY MEDICINE | Facility: CLINIC | Age: 48
End: 2018-02-02

## 2018-02-02 NOTE — TELEPHONE ENCOUNTER
----- Message from Ari Bismark sent at 2/2/2018  3:26 PM CST -----  Contact: self [phone]  Would like to consult with nurse regarding issue with sore throat, would like to know if something can be called to pharmacy.  Please call back at [phone].  Md Mark      Pt uses.  [id number] - [place] - [address]  [place] [address]  Phone: [phone] Fax: [phone]

## 2018-02-03 ENCOUNTER — OFFICE VISIT (OUTPATIENT)
Dept: FAMILY MEDICINE | Facility: CLINIC | Age: 48
End: 2018-02-03
Payer: COMMERCIAL

## 2018-02-03 VITALS
DIASTOLIC BLOOD PRESSURE: 82 MMHG | SYSTOLIC BLOOD PRESSURE: 135 MMHG | BODY MASS INDEX: 21 KG/M2 | HEART RATE: 96 BPM | HEIGHT: 64 IN | WEIGHT: 123 LBS | TEMPERATURE: 98 F

## 2018-02-03 DIAGNOSIS — J02.9 SORE THROAT: Primary | ICD-10-CM

## 2018-02-03 DIAGNOSIS — R05.9 COUGH: ICD-10-CM

## 2018-02-03 LAB
CTP QC/QA: YES
CTP QC/QA: YES
FLUAV AG NPH QL: NEGATIVE
FLUBV AG NPH QL: NEGATIVE
S PYO RRNA THROAT QL PROBE: NEGATIVE

## 2018-02-03 PROCEDURE — 3008F BODY MASS INDEX DOCD: CPT | Mod: S$GLB,,, | Performed by: FAMILY MEDICINE

## 2018-02-03 PROCEDURE — 87804 INFLUENZA ASSAY W/OPTIC: CPT | Mod: QW,S$GLB,, | Performed by: FAMILY MEDICINE

## 2018-02-03 PROCEDURE — 87880 STREP A ASSAY W/OPTIC: CPT | Mod: QW,S$GLB,, | Performed by: FAMILY MEDICINE

## 2018-02-03 PROCEDURE — 99213 OFFICE O/P EST LOW 20 MIN: CPT | Mod: 25,S$GLB,, | Performed by: FAMILY MEDICINE

## 2018-02-03 PROCEDURE — 99999 PR PBB SHADOW E&M-EST. PATIENT-LVL III: CPT | Mod: PBBFAC,,, | Performed by: FAMILY MEDICINE

## 2018-02-03 RX ORDER — MINERAL OIL
180 ENEMA (ML) RECTAL DAILY
Qty: 10 TABLET | Refills: 0 | Status: SHIPPED | OUTPATIENT
Start: 2018-02-03 | End: 2019-04-17

## 2018-02-03 RX ORDER — CODEINE PHOSPHATE AND GUAIFENESIN 10; 100 MG/5ML; MG/5ML
10 SOLUTION ORAL 4 TIMES DAILY PRN
Qty: 240 ML | Refills: 0 | Status: SHIPPED | OUTPATIENT
Start: 2018-02-03 | End: 2018-02-13

## 2018-02-03 NOTE — PROGRESS NOTES
"  Chief Complaint:  ([place]):[x]cough [x]runny nose [x]sinus congestion [x]sore throat    What symptoms have been present?  Gen:    []fever   [x]fatigue  HENT: [x]runny nose  [x]sore throat  [x]sinus congestion  []swollen glands  Eyes:   []eye itching []watery eyes []visual disturbance  []eye pain  Resp:  [x]cough []chest tightness []wheezing []shortness of breath  Card:   []chest pain  []leg swelling  GI:       []belly pain []blood in stool []diarrhea []constipation []nausea []vomiting    What symptoms have been absent?  Gen:    [x]fever   []fatigue  HENT: []runny nose  []sore throat  []sinus congestion  []swollen glands  Eyes:   []eye itching []watery eyes []visual disturbance  []eye pain  Resp:  []cough []chest tightness [x]wheezing [x]shortness of breath  Card:   []chest pain  []leg swelling  GI:       [x]belly pain []blood in stool [x]diarrhea []constipation []nausea []vomiting    How long have symptoms been present?   [x]1   []2   []3   []4   []5   []6   []7    []8   []9   []10   []11   []12     []Day(s)   []Week(s)   []Month(s)   []Year(s)     []Can't remember.   she has had exposure to strep, flu and pneumonia.  She has produced clear mucus.  She is a smoker.  She did have theraflu but it did not help her.  What makes it better? (type what makes it better in the box)       What makes it worse? (type what makes it better in the box)       The patient's Health Maintenance was reviewed and the following appears to be due at this time:  Health Maintenance Due   Topic Date Due    Eye Exam  07/30/1979    TETANUS VACCINE  07/30/1987    Pneumococcal PPSV23 (Medium Risk) (1) 07/30/1987    Mammogram  01/15/2017    Influenza Vaccine  08/01/2017       The current allergy list that we have since it was last reconciled is as follows:  Review of patient's allergies indicates:  No Known Allergies  Outpatient Medications Prior to Visit   Medication Sig Dispense Refill    BD INSULIN PEN NEEDLE UF MINI 31 gauge x 3/16" " "Ndle       blood sugar diagnostic, disc Strp 1 each by Misc.(Non-Drug; Combo Route) route 4 (four) times daily.      dulaglutide (TRULICITY) 0.75 mg/0.5 mL PnIj Inject 0.75 mg into the skin every 7 days.      duloxetine (CYMBALTA) 60 MG capsule Take 1 capsule (60 mg total) by mouth once daily. 90 capsule 3    empagliflozin (JARDIANCE) 25 mg Tab Take 25 mg by mouth once daily.      lancets 33 gauge Misc 1 lancet by Misc.(Non-Drug; Combo Route) route 4 (four) times daily.      lisinopril (PRINIVIL,ZESTRIL) 20 MG tablet Take 20 mg by mouth once daily.      metFORMIN (GLUCOPHAGE) 500 MG tablet Take 1 tablet (500 mg total) by mouth 2 (two) times daily with meals. 60 tablet 11    ondansetron (ZOFRAN-ODT) 8 MG TbDL Take 1 tablet (8 mg total) by mouth every 8 (eight) hours as needed. 15 tablet 4    pravastatin (PRAVACHOL) 20 MG tablet Take 1 tablet (20 mg total) by mouth every evening. 30 tablet 6    ranitidine (ZANTAC) 300 MG tablet Take 1 tablet (300 mg total) by mouth every evening. 30 tablet 3    valacyclovir (VALTREX) 1000 MG tablet Take 1,000 mg by mouth once daily.       gabapentin (NEURONTIN) 300 MG capsule Take 1 capsule (300 mg total) by mouth every evening. 30 capsule 11     No facility-administered medications prior to visit.         Physical Exam   /82   Pulse 96   Temp 98.1 °F (36.7 °C)   Ht 5' 4" (1.626 m)   Wt 55.8 kg (123 lb)   BMI 21.11 kg/m²   Constitutional: The patient appears well-developed and well-nourished.   Head: Normocephalic and atraumatic.   Right Ear: Tympanic membrane and ear canal normal. No drainage, swelling or tenderness. Tympanic membrane is not injected, not erythematous and not bulging.   Left Ear: Ear canal normal. No drainage, swelling or tenderness. Tympanic membrane is not injected, not erythematous and not bulging.   Nose: Mucosal edema and rhinorrhea present.   Mouth/Throat: Uvula is midline. Posterior oropharyngeal erythema present. No oropharyngeal " exudate.        THE MUCOSA IS BOGGY AND ERYTHEMATOUS.     Eyes: Conjunctivae normal and lids are normal. Pupils are equal, round, and reactive to light. Right eye exhibits no discharge. Left eye exhibits no discharge. Right eye exhibits normal extraocular motion. Left eye exhibits normal extraocular motion.   Neck: Trachea normal and normal range of motion. Neck supple. No tracheal tenderness present. No mass and no thyromegaly present.   Cardiovascular: Normal rate, regular rhythm, S1 normal, S2 normal and normal heart sounds.  Exam reveals no gallop, no S3, no S4 and no friction rub.    No murmur heard.  Pulmonary/Chest: Effort normal and breath sounds normal. No stridor. Not tachypneic. No respiratory distress. The patient has no wheezes. The patient has no rhonchi. The patient has no rales.   Skin: The patient is not diaphoretic.     Encounter Diagnoses   Name Primary?    Sore throat Yes    Cough        PLAN:    I have discontinued Ms. Cha's gabapentin. I am also having her start on guaifenesin-codeine 100-10 mg/5 ml and fexofenadine. Additionally, I am having her maintain her valACYclovir, BD INSULIN PEN NEEDLE UF MINI, DULoxetine, dulaglutide, lisinopril, empagliflozin, (blood sugar diagnostic, disc), lancets, pravastatin, ranitidine, ondansetron, and metFORMIN.    There are no diagnoses linked to this encounter.     Orders Placed This Encounter   Procedures    POCT Rapid Strep A    POCT Influenza A/B   these were negative.  RTC if symptoms are worsening or changing significantly or if not improved by the end of therapy.

## 2018-02-06 ENCOUNTER — LAB VISIT (OUTPATIENT)
Dept: LAB | Facility: HOSPITAL | Age: 48
End: 2018-02-06
Attending: FAMILY MEDICINE
Payer: COMMERCIAL

## 2018-02-06 DIAGNOSIS — E78.2 MIXED HYPERLIPIDEMIA: ICD-10-CM

## 2018-02-06 DIAGNOSIS — E11.9 TYPE 2 DIABETES MELLITUS WITHOUT COMPLICATION, UNSPECIFIED LONG TERM INSULIN USE STATUS: ICD-10-CM

## 2018-02-06 LAB
ALT SERPL W/O P-5'-P-CCNC: 12 U/L
AST SERPL-CCNC: 12 U/L
CHOLEST SERPL-MCNC: 176 MG/DL
CHOLEST/HDLC SERPL: 3.8 {RATIO}
ESTIMATED AVG GLUCOSE: 126 MG/DL
HBA1C MFR BLD HPLC: 6 %
HDLC SERPL-MCNC: 46 MG/DL
HDLC SERPL: 26.1 %
LDLC SERPL CALC-MCNC: 95.4 MG/DL
NONHDLC SERPL-MCNC: 130 MG/DL
TRIGL SERPL-MCNC: 173 MG/DL

## 2018-02-06 PROCEDURE — 83036 HEMOGLOBIN GLYCOSYLATED A1C: CPT

## 2018-02-06 PROCEDURE — 80061 LIPID PANEL: CPT

## 2018-02-06 PROCEDURE — 36415 COLL VENOUS BLD VENIPUNCTURE: CPT | Mod: PO

## 2018-02-06 PROCEDURE — 84460 ALANINE AMINO (ALT) (SGPT): CPT

## 2018-02-06 PROCEDURE — 84450 TRANSFERASE (AST) (SGOT): CPT

## 2018-02-07 ENCOUNTER — PATIENT MESSAGE (OUTPATIENT)
Dept: ENDOCRINOLOGY | Facility: CLINIC | Age: 48
End: 2018-02-07

## 2018-02-15 ENCOUNTER — OFFICE VISIT (OUTPATIENT)
Dept: ENDOCRINOLOGY | Facility: CLINIC | Age: 48
End: 2018-02-15
Payer: COMMERCIAL

## 2018-02-15 VITALS
HEART RATE: 98 BPM | DIASTOLIC BLOOD PRESSURE: 72 MMHG | HEIGHT: 64 IN | BODY MASS INDEX: 21.02 KG/M2 | WEIGHT: 123.13 LBS | SYSTOLIC BLOOD PRESSURE: 122 MMHG

## 2018-02-15 DIAGNOSIS — E11.40 TYPE 2 DIABETES MELLITUS WITH DIABETIC NEUROPATHY, WITHOUT LONG-TERM CURRENT USE OF INSULIN: Primary | ICD-10-CM

## 2018-02-15 DIAGNOSIS — E78.2 MIXED HYPERLIPIDEMIA: ICD-10-CM

## 2018-02-15 DIAGNOSIS — I10 ESSENTIAL HYPERTENSION: ICD-10-CM

## 2018-02-15 PROCEDURE — 99999 PR PBB SHADOW E&M-EST. PATIENT-LVL IV: CPT | Mod: PBBFAC,,, | Performed by: NURSE PRACTITIONER

## 2018-02-15 PROCEDURE — 3008F BODY MASS INDEX DOCD: CPT | Mod: S$GLB,,, | Performed by: NURSE PRACTITIONER

## 2018-02-15 PROCEDURE — 99214 OFFICE O/P EST MOD 30 MIN: CPT | Mod: S$GLB,,, | Performed by: NURSE PRACTITIONER

## 2018-02-15 NOTE — PROGRESS NOTES
CC: Ms. Kylee Cha arrives today for management of Type 2 DM and review of chronic medical conditions, as listed in the Visit Diagnosis section of this encounter.       HPI: Ms. Kylee Cha was diagnosed with Type 2 DM in 8/2017, after having recurrent yeast infections. A1c > 14% upon workup and she went to ER at time of diagnosis for glucose >600. C-peptide and DESMOND ab were normal. Initial treatment consisted of insulin and metformin. Jardiance and Trulicity were added soon after. + FH of DM in maternal GF. Denies hospitalizations due to DM.     At initial visit, insulins were discontinued due to improving glucoses. Metformin dose was increased but she couldn't tolerate, due to diarrhea.     BG readings are checked 1x/day (fasting only). Meter contains 7 and 14 day averages of 108 mg/dL. No logs to clinic.     Hypoglycemia: No    Missing Insulin/PO medication doses: No    Exercise: Not recently.     Dietary Habits: Eats 3 meals/day. Breakfast is lightest meal. Occasional snack - Atkins bar or shake, SF pudding. Avoids sugary beverages.     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       CURRENT DIABETIC MEDS: metformin 500 mg BID, Jardiance 25 mg daily, Trulicity 0.75 mg weekly  Glucometer type: [employer]    Previous DM treatments:  Januvia - not effective  Metformin 1000 mg - diarrhea    Last Eye Exam: 2017. Due for dilated eye exam. Will reschedule  Last Podiatry Exam: n/a    REVIEW OF SYSTEMS  Constitutional: no c/o fatigue, weakness, + intentional 30 # weight loss over 6 months.  Eyes: denies visual disturbances  Cardiac: no palpitations or chest pain.  Respiratory: no cough or dyspnea.  GI: no c/o abdominal pain or nausea. Denies h/o pancreatitis.   Skin: no lesions or rashes.  Neuro: + numbness, tingling in feet, L worse than R.  Endocrine: denies polyphagia, polydipsia, polyuria.      Personally reviewed Past Medical, Surgical, Social  "History.    Vital Signs  /72   Pulse 98   Ht 5' 4" (1.626 m)   Wt 55.8 kg (123 lb 1.6 oz)   BMI 21.13 kg/m²     Personally reviewed the below labs:    Hemoglobin A1C   Date Value Ref Range Status   02/06/2018 6.0 (H) 4.0 - 5.6 % Final     Comment:     According to ADA guidelines, hemoglobin A1c <7.0% represents  optimal control in non-pregnant diabetic patients. Different  metrics may apply to specific patient populations.   Standards of Medical Care in Diabetes-2016.  For the purpose of screening for the presence of diabetes:  <5.7%     Consistent with the absence of diabetes  5.7-6.4%  Consistent with increasing risk for diabetes   (prediabetes)  >or=6.5%  Consistent with diabetes  Currently, no consensus exists for use of hemoglobin A1c  for diagnosis of diabetes for children.  This Hemoglobin A1c assay has significant interference with fetal   hemoglobin   (HbF). The results are invalid for patients with abnormal amounts of   HbF,   including those with known Hereditary Persistence   of Fetal Hemoglobin. Heterozygous hemoglobin variants (HbAS, HbAC,   HbAD, HbAE, HbA2) do not significantly interfere with this assay;   however, presence of multiple variants in a sample may impact the %   interference.     11/08/2017 6.3 (H) 4.0 - 5.6 % Final     Comment:     According to ADA guidelines, hemoglobin A1c <7.0% represents  optimal control in non-pregnant diabetic patients. Different  metrics may apply to specific patient populations.   Standards of Medical Care in Diabetes-2016.  For the purpose of screening for the presence of diabetes:  <5.7%     Consistent with the absence of diabetes  5.7-6.4%  Consistent with increasing risk for diabetes   (prediabetes)  >or=6.5%  Consistent with diabetes  Currently, no consensus exists for use of hemoglobin A1c  for diagnosis of diabetes for children.  This Hemoglobin A1c assay has significant interference with fetal   hemoglobin   (HbF). The results are invalid for " patients with abnormal amounts of   HbF,   including those with known Hereditary Persistence   of Fetal Hemoglobin. Heterozygous hemoglobin variants (HbAS, HbAC,   HbAD, HbAE, HbA2) do not significantly interfere with this assay;   however, presence of multiple variants in a sample may impact the %   interference.     08/16/2017 >14.0 (H) 4.0 - 5.6 % Final     Comment:     According to ADA guidelines, hemoglobin A1c <7.0% represents  optimal control in non-pregnant diabetic patients. Different  metrics may apply to specific patient populations.   Standards of Medical Care in Diabetes-2016.  For the purpose of screening for the presence of diabetes:  <5.7%     Consistent with the absence of diabetes  5.7-6.4%  Consistent with increasing risk for diabetes   (prediabetes)  >or=6.5%  Consistent with diabetes  Currently, no consensus exists for use of hemoglobin A1c  for diagnosis of diabetes for children.  This Hemoglobin A1c assay has significant interference with fetal   hemoglobin   (HbF). The results are invalid for patients with abnormal amounts of   HbF,   including those with known Hereditary Persistence   of Fetal Hemoglobin. Heterozygous hemoglobin variants (HbAS, HbAC,   HbAD, HbAE, HbA2) do not significantly interfere with this assay;   however, presence of multiple variants in a sample may impact the %   interference.         Chemistry        Component Value Date/Time     11/08/2017 0958    K 3.8 11/08/2017 0958     11/08/2017 0958    CO2 25 11/08/2017 0958    BUN 15 11/08/2017 0958    CREATININE 0.7 11/08/2017 0958    GLU 97 11/08/2017 0958        Component Value Date/Time    CALCIUM 10.0 11/08/2017 0958    ALKPHOS 71 11/08/2017 0958    AST 12 02/06/2018 0833    ALT 12 02/06/2018 0833    BILITOT 0.3 11/08/2017 0958    ESTGFRAFRICA >60.0 11/08/2017 0958    EGFRNONAA >60.0 11/08/2017 0958          Lab Results   Component Value Date    CHOL 176 02/06/2018    CHOL 219 (H) 11/08/2017    CHOL 359 (H)  08/16/2017     Lab Results   Component Value Date    HDL 46 02/06/2018    HDL 48 11/08/2017    HDL 53 08/16/2017     Lab Results   Component Value Date    LDLCALC 95.4 02/06/2018    LDLCALC 140.0 11/08/2017    LDLCALC 252.8 (H) 08/16/2017     Lab Results   Component Value Date    TRIG 173 (H) 02/06/2018    TRIG 155 (H) 11/08/2017    TRIG 266 (H) 08/16/2017     Lab Results   Component Value Date    CHOLHDL 26.1 02/06/2018    CHOLHDL 21.9 11/08/2017    CHOLHDL 14.8 (L) 08/16/2017       No results found for: MICALBCREAT  Lab Results   Component Value Date    TSH 0.825 08/16/2017       CrCl cannot be calculated (Patient's most recent lab result is older than the maximum 7 days allowed.).    Vit D, 25-Hydroxy   Date Value Ref Range Status   03/29/2014 27 (L) 30 - 96 ng/mL Final     Comment:     Vitamin D deficiency.........<10 ng/mL                              Vitamin D insufficiency......10-29 ng/mL       Vitamin D sufficiency........> or equal to 30 ng/mL  Vitamin D toxicity............>100 ng/mL       PHYSICAL EXAMINATION  Constitutional: Appears well, no distress  Neck: Supple, trachea midline; no thyromegaly or nodules.   Respiratory: CTA, even and unlabored.  Cardiovascular: RRR, no murmurs, no carotid bruits. DP pulses  2+ bilaterally; no edema.    GI: bowel sounds active, no hernia noted  Lymph: no cervical or supraclavicular lymphadenopathy  Skin: warm and dry; no lipohypertrophy, or acanthosis nigracans observed.  Neuro: no loss of protective sensation via 10 gm monofilament. Mildly decreased vibratory exam, bilaterally, DTR 2+ BUE/2+BLE.  Feet: no open wounds or callouses; appropriate footwear. + plantar wart noted to L forefoot, no drainage or erythema      Assessment/Plan  1. Type 2 diabetes mellitus with neuropathy, without long term insulin use status  -- A1c at goal. I suggested doing a trial off of either Jardiance or Trulicity but she declined.  -- continue current meds.   -- check BG 1x/day,  alternating times  -- reschedule eye exam  -- recommended Vitamin B complex for mild neuropathy symptom management.     -- Discussed diagnosis of DM, A1c goals, progression of disease, long term complications and tx options.    2. Essential hypertension  -- controlled  -- continue lisinopril   3. Mixed hyperlipidemia  -- Improving. Triglycerides remain elevated   -- continue pravastatin. Couldn't tolerate atorvastatin  Lab Results   Component Value Date    LDLCALC 95.4 02/06/2018          FOLLOW UP  Follow-up in about 6 months (around 8/15/2018).   Patient instructed to bring BG logs to each follow up   Patient encouraged to call for any BG/medication issues, concerns, or questions.    Orders Placed This Encounter   Procedures    Hemoglobin A1c    Basic metabolic panel    Microalbumin/creatinine urine ratio    HM DIABETES FOOT EXAM

## 2018-03-16 ENCOUNTER — PATIENT MESSAGE (OUTPATIENT)
Dept: ENDOCRINOLOGY | Facility: CLINIC | Age: 48
End: 2018-03-16

## 2018-03-27 ENCOUNTER — TELEPHONE (OUTPATIENT)
Dept: ENDOCRINOLOGY | Facility: CLINIC | Age: 48
End: 2018-03-27

## 2018-03-27 ENCOUNTER — PATIENT MESSAGE (OUTPATIENT)
Dept: ENDOCRINOLOGY | Facility: CLINIC | Age: 48
End: 2018-03-27

## 2018-03-27 DIAGNOSIS — E11.40 TYPE 2 DIABETES MELLITUS WITH DIABETIC NEUROPATHY, WITHOUT LONG-TERM CURRENT USE OF INSULIN: Primary | ICD-10-CM

## 2018-03-27 NOTE — TELEPHONE ENCOUNTER
Pt asking for A1C done in late May  Please advise  Also needs to f/u in Aug. Would you like an appt with her in Aug? Earlier? Later?

## 2018-03-28 NOTE — TELEPHONE ENCOUNTER
Left message for pt to call and give times she can be available for the following per BISHOP Baldwin:  Please schedule A1c for mid May. A1c, BMP, urine prior to August follow up.

## 2018-03-29 ENCOUNTER — PATIENT MESSAGE (OUTPATIENT)
Dept: ENDOCRINOLOGY | Facility: CLINIC | Age: 48
End: 2018-03-29

## 2018-04-10 ENCOUNTER — PATIENT MESSAGE (OUTPATIENT)
Dept: ENDOCRINOLOGY | Facility: CLINIC | Age: 48
End: 2018-04-10

## 2018-04-12 ENCOUNTER — PATIENT MESSAGE (OUTPATIENT)
Dept: ENDOCRINOLOGY | Facility: CLINIC | Age: 48
End: 2018-04-12

## 2018-04-22 ENCOUNTER — PATIENT MESSAGE (OUTPATIENT)
Dept: FAMILY MEDICINE | Facility: CLINIC | Age: 48
End: 2018-04-22

## 2018-04-23 ENCOUNTER — PATIENT MESSAGE (OUTPATIENT)
Dept: ENDOCRINOLOGY | Facility: CLINIC | Age: 48
End: 2018-04-23

## 2018-05-08 ENCOUNTER — PATIENT MESSAGE (OUTPATIENT)
Dept: ENDOCRINOLOGY | Facility: CLINIC | Age: 48
End: 2018-05-08

## 2018-05-09 ENCOUNTER — PATIENT MESSAGE (OUTPATIENT)
Dept: ENDOCRINOLOGY | Facility: CLINIC | Age: 48
End: 2018-05-09

## 2018-05-09 RX ORDER — INSULIN PUMP SYRINGE, 3 ML
EACH MISCELLANEOUS
Qty: 1 EACH | Refills: 0 | Status: SHIPPED | OUTPATIENT
Start: 2018-05-09 | End: 2023-09-08

## 2018-05-09 RX ORDER — LANCETS
EACH MISCELLANEOUS
Qty: 100 EACH | Refills: 11 | Status: SHIPPED | OUTPATIENT
Start: 2018-05-09

## 2018-05-09 NOTE — TELEPHONE ENCOUNTER
----- Message from Laura Ross sent at 5/9/2018  2:36 PM CDT -----  Contact: [facility]   Laura called to request a new prescription for testing supplies : [employer] is what's needed insurance will not cover previous supplies..[phone] (home)           ..  [facility]/pharmacy #[id number] - [place], [address] [address]  Phone: [phone] Fax: [phone]

## 2018-05-10 ENCOUNTER — PATIENT MESSAGE (OUTPATIENT)
Dept: ENDOCRINOLOGY | Facility: CLINIC | Age: 48
End: 2018-05-10

## 2018-05-10 DIAGNOSIS — E11.40 TYPE 2 DIABETES MELLITUS WITH DIABETIC NEUROPATHY, WITHOUT LONG-TERM CURRENT USE OF INSULIN: ICD-10-CM

## 2018-05-10 DIAGNOSIS — E11.40 TYPE 2 DIABETES MELLITUS WITH DIABETIC NEUROPATHY, WITHOUT LONG-TERM CURRENT USE OF INSULIN: Primary | ICD-10-CM

## 2018-05-10 RX ORDER — DAPAGLIFLOZIN 10 MG/1
10 TABLET, FILM COATED ORAL DAILY
Qty: 90 TABLET | Refills: 3 | Status: SHIPPED | OUTPATIENT
Start: 2018-05-10 | End: 2018-07-10

## 2018-05-10 RX ORDER — DAPAGLIFLOZIN 10 MG/1
10 TABLET, FILM COATED ORAL DAILY
Qty: 30 TABLET | Refills: 6 | Status: SHIPPED | OUTPATIENT
Start: 2018-05-10 | End: 2018-05-10 | Stop reason: SDUPTHER

## 2018-05-14 ENCOUNTER — LAB VISIT (OUTPATIENT)
Dept: LAB | Facility: HOSPITAL | Age: 48
End: 2018-05-14
Attending: NURSE PRACTITIONER
Payer: COMMERCIAL

## 2018-05-14 DIAGNOSIS — E11.40 TYPE 2 DIABETES MELLITUS WITH DIABETIC NEUROPATHY, WITHOUT LONG-TERM CURRENT USE OF INSULIN: ICD-10-CM

## 2018-05-14 LAB
ESTIMATED AVG GLUCOSE: 120 MG/DL
HBA1C MFR BLD HPLC: 5.8 %

## 2018-05-14 PROCEDURE — 83036 HEMOGLOBIN GLYCOSYLATED A1C: CPT

## 2018-05-14 PROCEDURE — 36415 COLL VENOUS BLD VENIPUNCTURE: CPT | Mod: PO

## 2018-05-28 ENCOUNTER — PATIENT MESSAGE (OUTPATIENT)
Dept: ENDOCRINOLOGY | Facility: CLINIC | Age: 48
End: 2018-05-28

## 2018-06-04 DIAGNOSIS — E11.40 TYPE 2 DIABETES MELLITUS WITH DIABETIC NEUROPATHY, WITHOUT LONG-TERM CURRENT USE OF INSULIN: Primary | ICD-10-CM

## 2018-06-04 RX ORDER — METFORMIN HYDROCHLORIDE 500 MG/1
1000 TABLET, EXTENDED RELEASE ORAL 2 TIMES DAILY WITH MEALS
Qty: 120 TABLET | Refills: 11 | Status: SHIPPED | OUTPATIENT
Start: 2018-06-04 | End: 2019-05-31 | Stop reason: SDUPTHER

## 2018-06-13 ENCOUNTER — PATIENT MESSAGE (OUTPATIENT)
Dept: ENDOCRINOLOGY | Facility: CLINIC | Age: 48
End: 2018-06-13

## 2018-06-19 DIAGNOSIS — E78.2 MIXED HYPERLIPIDEMIA: ICD-10-CM

## 2018-06-19 RX ORDER — PRAVASTATIN SODIUM 20 MG/1
TABLET ORAL
Qty: 30 TABLET | Refills: 6 | Status: SHIPPED | OUTPATIENT
Start: 2018-06-19 | End: 2019-01-24 | Stop reason: SDUPTHER

## 2018-06-20 ENCOUNTER — TELEPHONE (OUTPATIENT)
Dept: ENDOCRINOLOGY | Facility: CLINIC | Age: 48
End: 2018-06-20

## 2018-06-20 ENCOUNTER — PATIENT MESSAGE (OUTPATIENT)
Dept: ENDOCRINOLOGY | Facility: CLINIC | Age: 48
End: 2018-06-20

## 2018-07-10 ENCOUNTER — PATIENT MESSAGE (OUTPATIENT)
Dept: ENDOCRINOLOGY | Facility: CLINIC | Age: 48
End: 2018-07-10

## 2018-07-10 DIAGNOSIS — E11.40 TYPE 2 DIABETES MELLITUS WITH DIABETIC NEUROPATHY, WITHOUT LONG-TERM CURRENT USE OF INSULIN: Primary | ICD-10-CM

## 2018-07-27 ENCOUNTER — LAB VISIT (OUTPATIENT)
Dept: LAB | Facility: HOSPITAL | Age: 48
End: 2018-07-27
Attending: NURSE PRACTITIONER
Payer: COMMERCIAL

## 2018-07-27 DIAGNOSIS — E11.40 TYPE 2 DIABETES MELLITUS WITH DIABETIC NEUROPATHY, WITHOUT LONG-TERM CURRENT USE OF INSULIN: ICD-10-CM

## 2018-07-27 LAB
ANION GAP SERPL CALC-SCNC: 12 MMOL/L
BUN SERPL-MCNC: 13 MG/DL
CALCIUM SERPL-MCNC: 10 MG/DL
CHLORIDE SERPL-SCNC: 105 MMOL/L
CO2 SERPL-SCNC: 26 MMOL/L
CREAT SERPL-MCNC: 0.7 MG/DL
EST. GFR  (AFRICAN AMERICAN): >60 ML/MIN/1.73 M^2
EST. GFR  (NON AFRICAN AMERICAN): >60 ML/MIN/1.73 M^2
ESTIMATED AVG GLUCOSE: 123 MG/DL
GLUCOSE SERPL-MCNC: 113 MG/DL
HBA1C MFR BLD HPLC: 5.9 %
POTASSIUM SERPL-SCNC: 4 MMOL/L
SODIUM SERPL-SCNC: 143 MMOL/L

## 2018-07-27 PROCEDURE — 80048 BASIC METABOLIC PNL TOTAL CA: CPT

## 2018-07-27 PROCEDURE — 36415 COLL VENOUS BLD VENIPUNCTURE: CPT | Mod: PO

## 2018-07-27 PROCEDURE — 83036 HEMOGLOBIN GLYCOSYLATED A1C: CPT

## 2018-08-02 ENCOUNTER — OFFICE VISIT (OUTPATIENT)
Dept: ENDOCRINOLOGY | Facility: CLINIC | Age: 48
End: 2018-08-02
Payer: COMMERCIAL

## 2018-08-02 VITALS
DIASTOLIC BLOOD PRESSURE: 72 MMHG | WEIGHT: 123.25 LBS | SYSTOLIC BLOOD PRESSURE: 94 MMHG | HEIGHT: 64 IN | RESPIRATION RATE: 18 BRPM | BODY MASS INDEX: 21.04 KG/M2 | HEART RATE: 100 BPM

## 2018-08-02 DIAGNOSIS — I10 ESSENTIAL HYPERTENSION: ICD-10-CM

## 2018-08-02 DIAGNOSIS — E78.2 MIXED HYPERLIPIDEMIA: ICD-10-CM

## 2018-08-02 DIAGNOSIS — E11.40 TYPE 2 DIABETES MELLITUS WITH DIABETIC NEUROPATHY, WITHOUT LONG-TERM CURRENT USE OF INSULIN: Primary | ICD-10-CM

## 2018-08-02 PROCEDURE — 99999 PR PBB SHADOW E&M-EST. PATIENT-LVL IV: CPT | Mod: PBBFAC,,, | Performed by: NURSE PRACTITIONER

## 2018-08-02 PROCEDURE — 99214 OFFICE O/P EST MOD 30 MIN: CPT | Mod: S$GLB,,, | Performed by: NURSE PRACTITIONER

## 2018-08-02 RX ORDER — GABAPENTIN 300 MG/1
300 CAPSULE ORAL NIGHTLY
Qty: 30 CAPSULE | Refills: 6 | Status: SHIPPED | OUTPATIENT
Start: 2018-08-02 | End: 2019-04-17

## 2018-08-02 NOTE — PROGRESS NOTES
"CC: Ms. Kylee BUSCH arrives today for management of Type 2 DM and review of chronic medical conditions, as listed in the Visit Diagnosis section of this encounter.       HPI: Ms. Kylee BUSCH was diagnosed with Type 2 DM in 8/2017, after having recurrent yeast infections. A1c > 14% upon workup and she went to ER at time of diagnosis for glucose >600. C-peptide and DESMOND ab were normal. Initial treatment consisted of insulin and metformin. Jardiance and Trulicity were added soon after. + FH of DM in maternal GF. Denies hospitalizations due to DM.     Last seen by me in February. Since this time, her insurance denied Jardiance and she used Farxiga instead. She felt she didn't have as tight control and when insurance changed again, she resumed Jardiance.     BG readings are checked 1x/day (fasting only). No logs to clinic. Reports FBG range 112-130.     Hypoglycemia: No    Missing Insulin/PO medication doses: No    Exercise: No    Dietary Habits: Eats 3 meals/day. Lunch is biggest meal. Dinner may be light. May have iced coffee with milk mid afternoon.        CURRENT DIABETIC MEDS: metformin XR 1000 mg BID, Jardiance 25 mg daily, Trulicity 0.75 mg weekly  Glucometer type: [employer]    Previous DM treatments:  Januvia - not effective  Metformin 1000 mg - diarrhea    Last Eye Exam: 2017. Due for dilated eye exam. Needs to reschedule  Last Podiatry Exam: n/a    REVIEW OF SYSTEMS  Constitutional: no c/o fatigue, weakness, weight fatigue.   Eyes: denies visual disturbances  Cardiac: no palpitations or chest pain.  Respiratory: no cough or dyspnea.  GI: no c/o abdominal pain or nausea. Denies h/o pancreatitis.   Skin: no lesions or rashes.  Neuro: + continuous numbness, tingling in feet, L worse than R.  Endocrine: denies polyphagia, polydipsia, polyuria.      Personally reviewed Past Medical, Surgical, Social History.    Vital Signs  BP 94/72   Pulse 100   Resp 18   Ht 5' 4" (1.626 m)   Wt 55.9 kg (123 " lb 3.8 oz)   BMI 21.15 kg/m²     Personally reviewed the below labs:    Hemoglobin A1C   Date Value Ref Range Status   07/27/2018 5.9 (H) 4.0 - 5.6 % Final     Comment:     ADA Screening Guidelines:  5.7-6.4%  Consistent with prediabetes  >or=6.5%  Consistent with diabetes  High levels of fetal hemoglobin interfere with the HbA1C  assay. Heterozygous hemoglobin variants (HbS, HgC, etc)do  not significantly interfere with this assay.   However, presence of multiple variants may affect accuracy.     05/14/2018 5.8 (H) 4.0 - 5.6 % Final     Comment:     According to ADA guidelines, hemoglobin A1c <7.0% represents  optimal control in non-pregnant diabetic patients. Different  metrics may apply to specific patient populations.   Standards of Medical Care in Diabetes-2016.  For the purpose of screening for the presence of diabetes:  <5.7%     Consistent with the absence of diabetes  5.7-6.4%  Consistent with increasing risk for diabetes   (prediabetes)  >or=6.5%  Consistent with diabetes  Currently, no consensus exists for use of hemoglobin A1c  for diagnosis of diabetes for children.  This Hemoglobin A1c assay has significant interference with fetal   hemoglobin   (HbF). The results are invalid for patients with abnormal amounts of   HbF,   including those with known Hereditary Persistence   of Fetal Hemoglobin. Heterozygous hemoglobin variants (HbAS, HbAC,   HbAD, HbAE, HbA2) do not significantly interfere with this assay;   however, presence of multiple variants in a sample may impact the %   interference.     02/06/2018 6.0 (H) 4.0 - 5.6 % Final     Comment:     According to ADA guidelines, hemoglobin A1c <7.0% represents  optimal control in non-pregnant diabetic patients. Different  metrics may apply to specific patient populations.   Standards of Medical Care in Diabetes-2016.  For the purpose of screening for the presence of diabetes:  <5.7%     Consistent with the absence of diabetes  5.7-6.4%  Consistent with  increasing risk for diabetes   (prediabetes)  >or=6.5%  Consistent with diabetes  Currently, no consensus exists for use of hemoglobin A1c  for diagnosis of diabetes for children.  This Hemoglobin A1c assay has significant interference with fetal   hemoglobin   (HbF). The results are invalid for patients with abnormal amounts of   HbF,   including those with known Hereditary Persistence   of Fetal Hemoglobin. Heterozygous hemoglobin variants (HbAS, HbAC,   HbAD, HbAE, HbA2) do not significantly interfere with this assay;   however, presence of multiple variants in a sample may impact the %   interference.         Chemistry        Component Value Date/Time     07/27/2018 0741    K 4.0 07/27/2018 0741     07/27/2018 0741    CO2 26 07/27/2018 0741    BUN 13 07/27/2018 0741    CREATININE 0.7 07/27/2018 0741     (H) 07/27/2018 0741        Component Value Date/Time    CALCIUM 10.0 07/27/2018 0741    ALKPHOS 71 11/08/2017 0958    AST 12 02/06/2018 0833    ALT 12 02/06/2018 0833    BILITOT 0.3 11/08/2017 0958    ESTGFRAFRICA >60.0 07/27/2018 0741    EGFRNONAA >60.0 07/27/2018 0741          Lab Results   Component Value Date    CHOL 176 02/06/2018    CHOL 219 (H) 11/08/2017    CHOL 359 (H) 08/16/2017     Lab Results   Component Value Date    HDL 46 02/06/2018    HDL 48 11/08/2017    HDL 53 08/16/2017     Lab Results   Component Value Date    LDLCALC 95.4 02/06/2018    LDLCALC 140.0 11/08/2017    LDLCALC 252.8 (H) 08/16/2017     Lab Results   Component Value Date    TRIG 173 (H) 02/06/2018    TRIG 155 (H) 11/08/2017    TRIG 266 (H) 08/16/2017     Lab Results   Component Value Date    CHOLHDL 26.1 02/06/2018    CHOLHDL 21.9 11/08/2017    CHOLHDL 14.8 (L) 08/16/2017       No results found for: MICALBCREAT  Lab Results   Component Value Date    TSH 0.825 08/16/2017       CrCl cannot be calculated (Unknown ideal weight.).    Vit D, 25-Hydroxy   Date Value Ref Range Status   03/29/2014 27 (L) 30 - 96 ng/mL Final      Comment:     Vitamin D deficiency.........<10 ng/mL                              Vitamin D insufficiency......10-29 ng/mL       Vitamin D sufficiency........> or equal to 30 ng/mL  Vitamin D toxicity............>100 ng/mL       PHYSICAL EXAMINATION  Constitutional: Appears well, no distress  Neck: Supple, trachea midline; no thyromegaly or nodules.   Respiratory: CTA, even and unlabored.  Cardiovascular: RRR, no murmurs, no carotid bruits. DP pulses  2+ bilaterally; no edema.    GI: bowel sounds active, no hernia noted  Lymph: no cervical or supraclavicular lymphadenopathy  Skin: warm and dry; no lipohypertrophy, or acanthosis nigracans observed.  Neuro: DTR 2+ BUE/2+BLE. Previously, no loss of protective sensation via 10 gm monofilament. Mildly decreased vibratory exam, bilaterally.   Feet: appropriate footwear.       A1c target < 7%      Assessment/Plan  1. Type 2 diabetes mellitus with neuropathy, without long term insulin use status  -- A1c at goal. It is possible she could maintain control off of Jardiance or Trulicity.   -- Will do a trial off of Trulicity.  -- continue metformin XR and Jardiance  -- check BG 2x/day and if BG consistently >150, resume Trulicity.   -- schedule eye exam. I asked her to have report faxed to us.   -- start gabapentin 300 mg nightly. Discussed medication's mechanism of action, side effects, and contraindications.     -- Discussed diagnosis of DM, A1c goals, progression of disease, long term complications and tx options.    2. Essential hypertension  -- controlled  -- continue lisinopril   3. Mixed hyperlipidemia  -- Improving. Triglycerides remain elevated   -- continue pravastatin (couldn't tolerate atorvastatin)  Lab Results   Component Value Date    LDLCALC 95.4 02/06/2018          FOLLOW UP  Follow-up in about 6 months (around 2/2/2019).   Patient instructed to bring BG logs to each follow up   Patient encouraged to call for any BG/medication issues, concerns, or  questions.    Orders Placed This Encounter   Procedures    Hemoglobin A1c    Microalbumin/creatinine urine ratio

## 2018-09-19 ENCOUNTER — PATIENT MESSAGE (OUTPATIENT)
Dept: ENDOCRINOLOGY | Facility: CLINIC | Age: 48
End: 2018-09-19

## 2018-09-20 RX ORDER — LISINOPRIL 20 MG/1
20 TABLET ORAL DAILY
Qty: 30 TABLET | Refills: 6 | Status: SHIPPED | OUTPATIENT
Start: 2018-09-20 | End: 2019-04-29 | Stop reason: SDUPTHER

## 2018-10-10 ENCOUNTER — PATIENT MESSAGE (OUTPATIENT)
Dept: ENDOCRINOLOGY | Facility: CLINIC | Age: 48
End: 2018-10-10

## 2018-10-24 RX ORDER — GABAPENTIN 300 MG/1
300 CAPSULE ORAL NIGHTLY
Qty: 30 CAPSULE | Refills: 12 | Status: SHIPPED | OUTPATIENT
Start: 2018-10-24 | End: 2019-04-17

## 2018-10-27 DIAGNOSIS — F41.9 ANXIETY: ICD-10-CM

## 2018-10-27 DIAGNOSIS — M79.7 FIBROMYALGIA: ICD-10-CM

## 2018-10-27 RX ORDER — DULOXETIN HYDROCHLORIDE 60 MG/1
CAPSULE, DELAYED RELEASE ORAL
Qty: 90 CAPSULE | Refills: 3 | Status: SHIPPED | OUTPATIENT
Start: 2018-10-27 | End: 2019-10-29 | Stop reason: SDUPTHER

## 2018-10-30 ENCOUNTER — PATIENT MESSAGE (OUTPATIENT)
Dept: ENDOCRINOLOGY | Facility: CLINIC | Age: 48
End: 2018-10-30

## 2018-11-16 ENCOUNTER — PATIENT MESSAGE (OUTPATIENT)
Dept: ENDOCRINOLOGY | Facility: CLINIC | Age: 48
End: 2018-11-16

## 2019-01-07 ENCOUNTER — PATIENT MESSAGE (OUTPATIENT)
Dept: ENDOCRINOLOGY | Facility: CLINIC | Age: 49
End: 2019-01-07

## 2019-01-07 RX ORDER — BLOOD-GLUCOSE METER
EACH MISCELLANEOUS
Qty: 100 STRIP | Refills: 3 | Status: SHIPPED | OUTPATIENT
Start: 2019-01-07 | End: 2020-01-15

## 2019-01-24 ENCOUNTER — PATIENT MESSAGE (OUTPATIENT)
Dept: ENDOCRINOLOGY | Facility: CLINIC | Age: 49
End: 2019-01-24

## 2019-01-24 DIAGNOSIS — E78.2 MIXED HYPERLIPIDEMIA: ICD-10-CM

## 2019-01-24 RX ORDER — PRAVASTATIN SODIUM 20 MG/1
20 TABLET ORAL NIGHTLY
Qty: 30 TABLET | Refills: 6 | Status: SHIPPED | OUTPATIENT
Start: 2019-01-24 | End: 2019-07-16 | Stop reason: SDUPTHER

## 2019-01-24 NOTE — TELEPHONE ENCOUNTER
----- Message from Dorothea Scherer sent at 1/24/2019  3:58 PM CST -----  Contact: [facility] [phone]  ..Refill request.  pravastatin (PRAVACHOL) 20 MG tablet     ..  [facility]/pharmacy #[id number] - [place], [place] - [address] [address]  Phone: [phone] Fax: [phone]

## 2019-01-31 ENCOUNTER — PATIENT MESSAGE (OUTPATIENT)
Dept: ENDOCRINOLOGY | Facility: CLINIC | Age: 49
End: 2019-01-31

## 2019-01-31 ENCOUNTER — LAB VISIT (OUTPATIENT)
Dept: LAB | Facility: HOSPITAL | Age: 49
End: 2019-01-31
Attending: NURSE PRACTITIONER
Payer: COMMERCIAL

## 2019-01-31 DIAGNOSIS — E11.40 TYPE 2 DIABETES MELLITUS WITH DIABETIC NEUROPATHY, WITHOUT LONG-TERM CURRENT USE OF INSULIN: ICD-10-CM

## 2019-01-31 LAB
ESTIMATED AVG GLUCOSE: 114 MG/DL
HBA1C MFR BLD HPLC: 5.6 %

## 2019-01-31 PROCEDURE — 83036 HEMOGLOBIN GLYCOSYLATED A1C: CPT

## 2019-01-31 PROCEDURE — 36415 COLL VENOUS BLD VENIPUNCTURE: CPT | Mod: PO

## 2019-02-07 ENCOUNTER — OFFICE VISIT (OUTPATIENT)
Dept: ENDOCRINOLOGY | Facility: CLINIC | Age: 49
End: 2019-02-07
Payer: COMMERCIAL

## 2019-02-07 VITALS
RESPIRATION RATE: 18 BRPM | DIASTOLIC BLOOD PRESSURE: 78 MMHG | WEIGHT: 126 LBS | SYSTOLIC BLOOD PRESSURE: 108 MMHG | HEART RATE: 92 BPM | HEIGHT: 64 IN | BODY MASS INDEX: 21.51 KG/M2

## 2019-02-07 DIAGNOSIS — E11.40 TYPE 2 DIABETES MELLITUS WITH DIABETIC NEUROPATHY, WITHOUT LONG-TERM CURRENT USE OF INSULIN: Primary | ICD-10-CM

## 2019-02-07 DIAGNOSIS — E55.9 VITAMIN D DEFICIENCY DISEASE: ICD-10-CM

## 2019-02-07 DIAGNOSIS — I10 ESSENTIAL HYPERTENSION: ICD-10-CM

## 2019-02-07 DIAGNOSIS — E78.2 MIXED HYPERLIPIDEMIA: ICD-10-CM

## 2019-02-07 PROCEDURE — 99999 PR PBB SHADOW E&M-EST. PATIENT-LVL IV: CPT | Mod: PBBFAC,,, | Performed by: NURSE PRACTITIONER

## 2019-02-07 PROCEDURE — 99214 PR OFFICE/OUTPT VISIT, EST, LEVL IV, 30-39 MIN: ICD-10-PCS | Mod: S$GLB,,, | Performed by: NURSE PRACTITIONER

## 2019-02-07 PROCEDURE — 99214 OFFICE O/P EST MOD 30 MIN: CPT | Mod: S$GLB,,, | Performed by: NURSE PRACTITIONER

## 2019-02-07 PROCEDURE — 99999 PR PBB SHADOW E&M-EST. PATIENT-LVL IV: ICD-10-PCS | Mod: PBBFAC,,, | Performed by: NURSE PRACTITIONER

## 2019-02-07 RX ORDER — ONDANSETRON 8 MG/1
8 TABLET, ORALLY DISINTEGRATING ORAL EVERY 8 HOURS PRN
Qty: 15 TABLET | Refills: 1 | Status: SHIPPED | OUTPATIENT
Start: 2019-02-07 | End: 2020-11-06 | Stop reason: SDUPTHER

## 2019-02-07 NOTE — PROGRESS NOTES
"CC: Ms. Kylee Burgos arrives today for management of Type 2 DM and review of chronic medical conditions, as listed in the Visit Diagnosis section of this encounter.       HPI: Ms. Kylee Burgos was diagnosed with Type 2 DM in 8/2017, after having recurrent yeast infections. A1c > 14% upon workup and she went to ER at time of diagnosis for glucose >600. C-peptide and DESMOND ab were normal. Initial treatment consisted of insulin and metformin. Jardiance and Trulicity were added soon after. She lost 25 lbs in 2017 and was able to discontinue insulin. + FH of DM in maternal GF. Denies hospitalizations due to DM.     Last seen by me in August.      BG readings are checked 1x/day (fasting only). Brings meter. BG range .    Hypoglycemia: No    Missing Insulin/PO medication doses: No    Exercise: No but plans to start walking    Dietary Habits: Eats 3 meals/day. Occasional snacking. Avoids sugary beverages.     She continues smoking and isn't ready to quit.       CURRENT DIABETIC MEDS: metformin XR 1000 mg BID, Jardiance 25 mg daily, Trulicity 0.75 mg weekly  Glucometer type: One Touch Verio    Previous DM treatments:  Januvia - not effective  Metformin 1000 mg - diarrhea  Farxiga - elevated glucoses when compared to Jardiance     Last Eye Exam: 2017. Due for dilated eye exam. Needs to reschedule. Plans to return to [facility].  Last Podiatry Exam: 2018 for ingrown toe nails    REVIEW OF SYSTEMS  Constitutional: no c/o fatigue, weakness, weight fatigue.   Eyes: denies visual disturbances  Cardiac: no palpitations or chest pain.  Respiratory: no cough or dyspnea.  GI: no c/o abdominal pain. Denies h/o pancreatitis. Reports intermittent nausea.  Skin: no lesions or rashes.  Neuro: denies numbness, tingling, paresthesias   Endocrine: denies polyphagia, polydipsia, polyuria.      Personally reviewed Past Medical, Surgical, Social History.    Vital Signs  /78   Pulse 92   Resp 18   Ht 5' 4" (1.626 m)  "  Wt 57.2 kg (126 lb)   BMI 21.63 kg/m²     Personally reviewed the below labs:    Hemoglobin A1C   Date Value Ref Range Status   01/31/2019 5.6 4.0 - 5.6 % Final     Comment:     ADA Screening Guidelines:  5.7-6.4%  Consistent with prediabetes  >or=6.5%  Consistent with diabetes  High levels of fetal hemoglobin interfere with the HbA1C  assay. Heterozygous hemoglobin variants (HbS, HgC, etc)do  not significantly interfere with this assay.   However, presence of multiple variants may affect accuracy.     07/27/2018 5.9 (H) 4.0 - 5.6 % Final     Comment:     ADA Screening Guidelines:  5.7-6.4%  Consistent with prediabetes  >or=6.5%  Consistent with diabetes  High levels of fetal hemoglobin interfere with the HbA1C  assay. Heterozygous hemoglobin variants (HbS, HgC, etc)do  not significantly interfere with this assay.   However, presence of multiple variants may affect accuracy.     05/14/2018 5.8 (H) 4.0 - 5.6 % Final     Comment:     According to ADA guidelines, hemoglobin A1c <7.0% represents  optimal control in non-pregnant diabetic patients. Different  metrics may apply to specific patient populations.   Standards of Medical Care in Diabetes-2016.  For the purpose of screening for the presence of diabetes:  <5.7%     Consistent with the absence of diabetes  5.7-6.4%  Consistent with increasing risk for diabetes   (prediabetes)  >or=6.5%  Consistent with diabetes  Currently, no consensus exists for use of hemoglobin A1c  for diagnosis of diabetes for children.  This Hemoglobin A1c assay has significant interference with fetal   hemoglobin   (HbF). The results are invalid for patients with abnormal amounts of   HbF,   including those with known Hereditary Persistence   of Fetal Hemoglobin. Heterozygous hemoglobin variants (HbAS, HbAC,   HbAD, HbAE, HbA2) do not significantly interfere with this assay;   however, presence of multiple variants in a sample may impact the %   interference.         Chemistry         Component Value Date/Time     07/27/2018 0741    K 4.0 07/27/2018 0741     07/27/2018 0741    CO2 26 07/27/2018 0741    BUN 13 07/27/2018 0741    CREATININE 0.7 07/27/2018 0741     (H) 07/27/2018 0741        Component Value Date/Time    CALCIUM 10.0 07/27/2018 0741    ALKPHOS 71 11/08/2017 0958    AST 12 02/06/2018 0833    ALT 12 02/06/2018 0833    BILITOT 0.3 11/08/2017 0958    ESTGFRAFRICA >60.0 07/27/2018 0741    EGFRNONAA >60.0 07/27/2018 0741          Lab Results   Component Value Date    CHOL 176 02/06/2018    CHOL 219 (H) 11/08/2017    CHOL 359 (H) 08/16/2017     Lab Results   Component Value Date    HDL 46 02/06/2018    HDL 48 11/08/2017    HDL 53 08/16/2017     Lab Results   Component Value Date    LDLCALC 95.4 02/06/2018    LDLCALC 140.0 11/08/2017    LDLCALC 252.8 (H) 08/16/2017     Lab Results   Component Value Date    TRIG 173 (H) 02/06/2018    TRIG 155 (H) 11/08/2017    TRIG 266 (H) 08/16/2017     Lab Results   Component Value Date    CHOLHDL 26.1 02/06/2018    CHOLHDL 21.9 11/08/2017    CHOLHDL 14.8 (L) 08/16/2017       Lab Results   Component Value Date    MICALBCREAT 22.4 01/31/2019     Lab Results   Component Value Date    TSH 0.825 08/16/2017       CrCl cannot be calculated (Patient's most recent lab result is older than the maximum 7 days allowed.).    Vit D, 25-Hydroxy   Date Value Ref Range Status   03/29/2014 27 (L) 30 - 96 ng/mL Final     Comment:     Vitamin D deficiency.........<10 ng/mL                              Vitamin D insufficiency......10-29 ng/mL       Vitamin D sufficiency........> or equal to 30 ng/mL  Vitamin D toxicity............>100 ng/mL       PHYSICAL EXAMINATION  Constitutional: Appears well, no distress  Neck: Supple, trachea midline; no thyromegaly or nodules.   Respiratory: CTA, even and unlabored.  Cardiovascular: RRR, no murmurs, no carotid bruits. DP pulses  2+ bilaterally; no edema.    GI: bowel sounds active, no hernia noted  Lymph: no  cervical or supraclavicular lymphadenopathy  Skin: warm and dry; no lipohypertrophy, or acanthosis nigracans observed.  Neuro: DTR 2+ BUE/2+BLE. No loss of protective sensation via 10 gm monofilament. Mildly decreased vibratory exam, bilaterally.   Feet: appropriate footwear. No open wounds. Mild calluses noted to TOVA gonzalez.       A1c target < 7%      Assessment/Plan  1. Type 2 diabetes mellitus with neuropathy, without long term insulin use status  -- A1c remains at goal.   -- will do trial period off of Trulicity.  -- continue metformin XR and Jardiance  -- check BG 1x/day and if fasting BG consistently >130, resume Trulicity.   -- schedule eye exam. I asked her to have report faxed to us.      -- Discussed diagnosis of DM, A1c goals, progression of disease, long term complications and tx options.    2. Essential hypertension  -- controlled  -- continue lisinopril   3. Mixed hyperlipidemia  -- Improving. Triglycerides remain elevated   -- continue pravastatin (couldn't tolerate atorvastatin)  Lab Results   Component Value Date    LDLCALC 95.4 02/06/2018      4. Vitamin D deficiency -- begin Vitamin D3 1000 mg daily  -- check level with RTC       FOLLOW UP  Follow-up in about 6 months (around 8/7/2019).   Patient instructed to bring BG logs to each follow up   Patient encouraged to call for any BG/medication issues, concerns, or questions.    Orders Placed This Encounter   Procedures    Hemoglobin A1c    Lipid panel    Comprehensive metabolic panel    TSH    Vitamin D    [facility] DIABETES FOOT EXAM

## 2019-04-09 ENCOUNTER — PATIENT MESSAGE (OUTPATIENT)
Dept: ENDOCRINOLOGY | Facility: CLINIC | Age: 49
End: 2019-04-09

## 2019-04-17 ENCOUNTER — OFFICE VISIT (OUTPATIENT)
Dept: FAMILY MEDICINE | Facility: CLINIC | Age: 49
End: 2019-04-17
Payer: COMMERCIAL

## 2019-04-17 VITALS
HEART RATE: 83 BPM | SYSTOLIC BLOOD PRESSURE: 111 MMHG | HEIGHT: 64 IN | WEIGHT: 126.31 LBS | DIASTOLIC BLOOD PRESSURE: 62 MMHG | BODY MASS INDEX: 21.56 KG/M2

## 2019-04-17 DIAGNOSIS — E78.2 MIXED HYPERLIPIDEMIA: ICD-10-CM

## 2019-04-17 DIAGNOSIS — E11.40 TYPE 2 DIABETES MELLITUS WITH DIABETIC NEUROPATHY, WITHOUT LONG-TERM CURRENT USE OF INSULIN: Primary | ICD-10-CM

## 2019-04-17 DIAGNOSIS — M79.7 FIBROMYALGIA: ICD-10-CM

## 2019-04-17 DIAGNOSIS — Z87.39 H/O SCLERODERMA: ICD-10-CM

## 2019-04-17 DIAGNOSIS — I10 ESSENTIAL HYPERTENSION: ICD-10-CM

## 2019-04-17 DIAGNOSIS — J30.1 SEASONAL ALLERGIC RHINITIS DUE TO POLLEN: ICD-10-CM

## 2019-04-17 PROCEDURE — 99999 PR PBB SHADOW E&M-EST. PATIENT-LVL III: ICD-10-PCS | Mod: PBBFAC,,, | Performed by: FAMILY MEDICINE

## 2019-04-17 PROCEDURE — 99214 PR OFFICE/OUTPT VISIT, EST, LEVL IV, 30-39 MIN: ICD-10-PCS | Mod: S$GLB,,, | Performed by: FAMILY MEDICINE

## 2019-04-17 PROCEDURE — 99214 OFFICE O/P EST MOD 30 MIN: CPT | Mod: S$GLB,,, | Performed by: FAMILY MEDICINE

## 2019-04-17 PROCEDURE — 99999 PR PBB SHADOW E&M-EST. PATIENT-LVL III: CPT | Mod: PBBFAC,,, | Performed by: FAMILY MEDICINE

## 2019-04-17 RX ORDER — LEVOCETIRIZINE DIHYDROCHLORIDE 5 MG/1
5 TABLET, FILM COATED ORAL NIGHTLY
Qty: 30 TABLET | Refills: 11 | Status: SHIPPED | OUTPATIENT
Start: 2019-04-17 | End: 2020-09-03

## 2019-04-17 NOTE — PROGRESS NOTES
"Subjective:       Patient ID: Kylee Burgos is a 48 y.o. female.    Chief Complaint: Establish Care    This pt is new to me.  She is followed by Rosetta Schneider for type II DM.  She is doing well with her glucose control.  She is also followed for fibromyalgia, HTN, HLD and GERD.  She reports that she has a hx of scleroderma diagnosed at age 4--she says it is not active at present.  We reviewed her most recent labs.  She feels she is getting a cold.    Review of Systems   Constitutional: Negative for activity change, appetite change, fatigue and unexpected weight change.   HENT: Positive for postnasal drip and sneezing. Negative for trouble swallowing.    Eyes: Negative for visual disturbance.   Respiratory: Positive for cough. Negative for chest tightness and shortness of breath.    Cardiovascular: Negative for chest pain, palpitations and leg swelling.   Gastrointestinal: Negative for abdominal pain, constipation, diarrhea, nausea and vomiting.   Endocrine: Negative for cold intolerance, heat intolerance and polyuria.   Genitourinary: Negative for decreased urine volume and dysuria.   Musculoskeletal: Negative for arthralgias and back pain.   Skin: Negative for rash.   Neurological: Negative for numbness and headaches.       Objective:       Vitals:    04/17/19 0846   BP: 111/62   BP Location: Right arm   Patient Position: Sitting   BP Method: Medium (Manual)   Pulse: 83   Weight: 57.3 kg (126 lb 5.2 oz)   Height: 5' 4" (1.626 m)     Physical Exam   Constitutional: She is oriented to person, place, and time. She appears well-developed and well-nourished.   HENT:   Head: Normocephalic.   Boggy nasal mucosa, post nasal drainage   Eyes: Pupils are equal, round, and reactive to light. Conjunctivae and EOM are normal.   Neck: Normal range of motion. Neck supple. No thyromegaly present.   Cardiovascular: Normal rate, regular rhythm and normal heart sounds.   Pulmonary/Chest: Effort normal and breath sounds normal. "   Abdominal: Soft. Bowel sounds are normal. There is no tenderness.   Musculoskeletal: Normal range of motion. She exhibits no tenderness or deformity.   Lymphadenopathy:     She has no cervical adenopathy.   Neurological: She is alert and oriented to person, place, and time. She displays normal reflexes. No cranial nerve deficit. She exhibits normal muscle tone. Coordination normal.   Skin: Skin is warm and dry.   Psychiatric: She has a normal mood and affect. Her behavior is normal.       Assessment:       1. Type 2 diabetes mellitus with diabetic neuropathy, without long-term current use of insulin    2. Essential hypertension    3. Mixed hyperlipidemia    4. H/O scleroderma    5. Fibromyalgia    6. Seasonal allergic rhinitis due to pollen        Plan:       Kylee was seen today for establish care.    Diagnoses and all orders for this visit:    Type 2 diabetes mellitus with diabetic neuropathy, without long-term current use of insulin  -     Ambulatory consult to Optometry    Essential hypertension    Mixed hyperlipidemia    H/O scleroderma    Fibromyalgia    Seasonal allergic rhinitis due to pollen  -     levocetirizine (XYZAL) 5 MG tablet; Take 1 tablet (5 mg total) by mouth every evening. As needed for allergies      During this visit, I reviewed the pt's history, medications, allergies, and problem list.

## 2019-04-29 ENCOUNTER — PATIENT MESSAGE (OUTPATIENT)
Dept: FAMILY MEDICINE | Facility: CLINIC | Age: 49
End: 2019-04-29

## 2019-04-29 RX ORDER — LISINOPRIL 20 MG/1
20 TABLET ORAL DAILY
Qty: 30 TABLET | Refills: 6 | Status: SHIPPED | OUTPATIENT
Start: 2019-04-29 | End: 2019-10-22 | Stop reason: SDUPTHER

## 2019-05-03 ENCOUNTER — TELEPHONE (OUTPATIENT)
Dept: OPTOMETRY | Facility: CLINIC | Age: 49
End: 2019-05-03

## 2019-05-03 NOTE — TELEPHONE ENCOUNTER
L/M for pt.  Eye appointment on May 8 needs to be rescheduled due to Dr. Diamond out of clinic.  Pt can call to schedule appt per convenience.

## 2019-05-30 ENCOUNTER — PATIENT MESSAGE (OUTPATIENT)
Dept: ENDOCRINOLOGY | Facility: CLINIC | Age: 49
End: 2019-05-30

## 2019-05-31 DIAGNOSIS — E11.40 TYPE 2 DIABETES MELLITUS WITH DIABETIC NEUROPATHY, WITHOUT LONG-TERM CURRENT USE OF INSULIN: ICD-10-CM

## 2019-05-31 RX ORDER — METFORMIN HYDROCHLORIDE 500 MG/1
1000 TABLET, EXTENDED RELEASE ORAL 2 TIMES DAILY WITH MEALS
Qty: 120 TABLET | Refills: 11 | Status: SHIPPED | OUTPATIENT
Start: 2019-05-31 | End: 2020-03-13 | Stop reason: SDUPTHER

## 2019-06-17 ENCOUNTER — PATIENT MESSAGE (OUTPATIENT)
Dept: ENDOCRINOLOGY | Facility: CLINIC | Age: 49
End: 2019-06-17

## 2019-07-16 DIAGNOSIS — E78.2 MIXED HYPERLIPIDEMIA: ICD-10-CM

## 2019-07-16 RX ORDER — PRAVASTATIN SODIUM 20 MG/1
TABLET ORAL
Qty: 90 TABLET | Refills: 1 | Status: SHIPPED | OUTPATIENT
Start: 2019-07-16 | End: 2020-01-08 | Stop reason: SDUPTHER

## 2019-07-19 ENCOUNTER — LAB VISIT (OUTPATIENT)
Dept: LAB | Facility: HOSPITAL | Age: 49
End: 2019-07-19
Attending: NURSE PRACTITIONER
Payer: COMMERCIAL

## 2019-07-19 DIAGNOSIS — E11.40 TYPE 2 DIABETES MELLITUS WITH DIABETIC NEUROPATHY, WITHOUT LONG-TERM CURRENT USE OF INSULIN: ICD-10-CM

## 2019-07-19 DIAGNOSIS — E55.9 VITAMIN D DEFICIENCY DISEASE: ICD-10-CM

## 2019-07-19 LAB
25(OH)D3+25(OH)D2 SERPL-MCNC: 16 NG/ML (ref 30–96)
ALBUMIN SERPL BCP-MCNC: 3.3 G/DL (ref 3.5–5.2)
ALP SERPL-CCNC: 93 U/L (ref 55–135)
ALT SERPL W/O P-5'-P-CCNC: 32 U/L (ref 10–44)
ANION GAP SERPL CALC-SCNC: 10 MMOL/L (ref 8–16)
AST SERPL-CCNC: 20 U/L (ref 10–40)
BILIRUB SERPL-MCNC: 0.2 MG/DL (ref 0.1–1)
BUN SERPL-MCNC: 19 MG/DL (ref 6–20)
CALCIUM SERPL-MCNC: 9.7 MG/DL (ref 8.7–10.5)
CHLORIDE SERPL-SCNC: 108 MMOL/L (ref 95–110)
CHOLEST SERPL-MCNC: 164 MG/DL (ref 120–199)
CHOLEST/HDLC SERPL: 2.8 {RATIO} (ref 2–5)
CO2 SERPL-SCNC: 24 MMOL/L (ref 23–29)
CREAT SERPL-MCNC: 0.8 MG/DL (ref 0.5–1.4)
EST. GFR  (AFRICAN AMERICAN): >60 ML/MIN/1.73 M^2
EST. GFR  (NON AFRICAN AMERICAN): >60 ML/MIN/1.73 M^2
ESTIMATED AVG GLUCOSE: 128 MG/DL (ref 68–131)
GLUCOSE SERPL-MCNC: 108 MG/DL (ref 70–110)
HBA1C MFR BLD HPLC: 6.1 % (ref 4–5.6)
HDLC SERPL-MCNC: 59 MG/DL (ref 40–75)
HDLC SERPL: 36 % (ref 20–50)
LDLC SERPL CALC-MCNC: 84.6 MG/DL (ref 63–159)
NONHDLC SERPL-MCNC: 105 MG/DL
POTASSIUM SERPL-SCNC: 5 MMOL/L (ref 3.5–5.1)
PROT SERPL-MCNC: 6.8 G/DL (ref 6–8.4)
SODIUM SERPL-SCNC: 142 MMOL/L (ref 136–145)
TRIGL SERPL-MCNC: 102 MG/DL (ref 30–150)
TSH SERPL DL<=0.005 MIU/L-ACNC: 1.45 UIU/ML (ref 0.4–4)

## 2019-07-19 PROCEDURE — 84443 ASSAY THYROID STIM HORMONE: CPT

## 2019-07-19 PROCEDURE — 80061 LIPID PANEL: CPT

## 2019-07-19 PROCEDURE — 80053 COMPREHEN METABOLIC PANEL: CPT

## 2019-07-19 PROCEDURE — 36415 COLL VENOUS BLD VENIPUNCTURE: CPT | Mod: PO

## 2019-07-19 PROCEDURE — 82306 VITAMIN D 25 HYDROXY: CPT

## 2019-07-19 PROCEDURE — 83036 HEMOGLOBIN GLYCOSYLATED A1C: CPT

## 2019-07-23 ENCOUNTER — PATIENT MESSAGE (OUTPATIENT)
Dept: ENDOCRINOLOGY | Facility: CLINIC | Age: 49
End: 2019-07-23

## 2019-07-23 DIAGNOSIS — E11.40 TYPE 2 DIABETES MELLITUS WITH DIABETIC NEUROPATHY, WITHOUT LONG-TERM CURRENT USE OF INSULIN: ICD-10-CM

## 2019-08-02 DIAGNOSIS — Z12.11 COLON CANCER SCREENING: ICD-10-CM

## 2019-08-08 ENCOUNTER — OFFICE VISIT (OUTPATIENT)
Dept: ENDOCRINOLOGY | Facility: CLINIC | Age: 49
End: 2019-08-08
Payer: COMMERCIAL

## 2019-08-08 VITALS
BODY MASS INDEX: 22.74 KG/M2 | HEART RATE: 96 BPM | RESPIRATION RATE: 18 BRPM | HEIGHT: 64 IN | WEIGHT: 133.19 LBS | SYSTOLIC BLOOD PRESSURE: 108 MMHG | DIASTOLIC BLOOD PRESSURE: 62 MMHG

## 2019-08-08 DIAGNOSIS — E11.40 TYPE 2 DIABETES MELLITUS WITH DIABETIC NEUROPATHY, WITHOUT LONG-TERM CURRENT USE OF INSULIN: Primary | ICD-10-CM

## 2019-08-08 DIAGNOSIS — E55.9 VITAMIN D DEFICIENCY DISEASE: ICD-10-CM

## 2019-08-08 DIAGNOSIS — I10 ESSENTIAL HYPERTENSION: ICD-10-CM

## 2019-08-08 DIAGNOSIS — E78.2 MIXED HYPERLIPIDEMIA: ICD-10-CM

## 2019-08-08 PROCEDURE — 99214 OFFICE O/P EST MOD 30 MIN: CPT | Mod: S$GLB,,, | Performed by: NURSE PRACTITIONER

## 2019-08-08 PROCEDURE — 99999 PR PBB SHADOW E&M-EST. PATIENT-LVL III: CPT | Mod: PBBFAC,,, | Performed by: NURSE PRACTITIONER

## 2019-08-08 PROCEDURE — 99999 PR PBB SHADOW E&M-EST. PATIENT-LVL III: ICD-10-PCS | Mod: PBBFAC,,, | Performed by: NURSE PRACTITIONER

## 2019-08-08 PROCEDURE — 99214 PR OFFICE/OUTPT VISIT, EST, LEVL IV, 30-39 MIN: ICD-10-PCS | Mod: S$GLB,,, | Performed by: NURSE PRACTITIONER

## 2019-08-08 RX ORDER — ERGOCALCIFEROL 1.25 MG/1
50000 CAPSULE ORAL
Qty: 12 CAPSULE | Refills: 1 | Status: SHIPPED | OUTPATIENT
Start: 2019-08-08 | End: 2020-01-15

## 2019-08-08 NOTE — PROGRESS NOTES
"CC: Ms. Kylee Burgos arrives today for management of Type 2 DM and review of chronic medical conditions, as listed in the Visit Diagnosis section of this encounter.       HPI: Ms. Kylee Burgos was diagnosed with Type 2 DM in 8/2017, after having recurrent yeast infections. A1c > 14% upon workup and she went to ER at time of diagnosis for glucose >600. C-peptide and DESMOND ab were normal. Initial treatment consisted of insulin and metformin. Jardiance and Trulicity were added soon after. She lost 25 lbs in 2017 and was able to discontinue insulin. + FH of DM in maternal GF. Denies hospitalizations due to DM.     Last seen by me in February. Trulicity was discontinued at that time, due to A1c of 5.6% on triple therapy.     BG readings are checked 1x/day (fasting only). No logs or meter to clinic. BG range 118-130    Hypoglycemia: No    Missing Insulin/PO medication doses: No    Exercise: No    Dietary Habits: Eats 3 meals/day. Dinner is lightest meal. May snack on popcorn in the afternoon. Avoids sugary beverages.     She is not taking OTC Vitamin D.      CURRENT DIABETIC MEDS: metformin XR 1000 mg BID, Jardiance 25 mg daily  Glucometer type: One Touch Verio    Previous DM treatments:  Januvia - not effective  Metformin 1000 mg - diarrhea  Farxiga - elevated glucoses when compared to Jardiance   Trulicity    Last Eye Exam: 2017.  Last Podiatry Exam: 2018 for ingrown toe nails    REVIEW OF SYSTEMS  Constitutional: no c/o fatigue, weakness, weight loss.  Eyes: denies visual disturbances  Cardiac: no palpitations or chest pain.  Respiratory: no cough or dyspnea.  GI: no c/o abdominal pain, nausea. Denies h/o pancreatitis.   Skin: no lesions or rashes.  Neuro: denies numbness, tingling, paresthesias   Endocrine: denies polyphagia, polydipsia, polyuria.      Personally reviewed Past Medical, Surgical, Social History.    Vital Signs  /62   Pulse 96   Resp 18   Ht 5' 4" (1.626 m)   Wt 60.4 kg (133 lb 2.5 " oz)   BMI 22.86 kg/m²     Personally reviewed the below labs:    Hemoglobin A1C   Date Value Ref Range Status   07/19/2019 6.1 (H) 4.0 - 5.6 % Final     Comment:     ADA Screening Guidelines:  5.7-6.4%  Consistent with prediabetes  >or=6.5%  Consistent with diabetes  High levels of fetal hemoglobin interfere with the HbA1C  assay. Heterozygous hemoglobin variants (HbS, HgC, etc)do  not significantly interfere with this assay.   However, presence of multiple variants may affect accuracy.     01/31/2019 5.6 4.0 - 5.6 % Final     Comment:     ADA Screening Guidelines:  5.7-6.4%  Consistent with prediabetes  >or=6.5%  Consistent with diabetes  High levels of fetal hemoglobin interfere with the HbA1C  assay. Heterozygous hemoglobin variants (HbS, HgC, etc)do  not significantly interfere with this assay.   However, presence of multiple variants may affect accuracy.     07/27/2018 5.9 (H) 4.0 - 5.6 % Final     Comment:     ADA Screening Guidelines:  5.7-6.4%  Consistent with prediabetes  >or=6.5%  Consistent with diabetes  High levels of fetal hemoglobin interfere with the HbA1C  assay. Heterozygous hemoglobin variants (HbS, HgC, etc)do  not significantly interfere with this assay.   However, presence of multiple variants may affect accuracy.         Chemistry        Component Value Date/Time     07/19/2019 0805    K 5.0 07/19/2019 0805     07/19/2019 0805    CO2 24 07/19/2019 0805    BUN 19 07/19/2019 0805    CREATININE 0.8 07/19/2019 0805     07/19/2019 0805        Component Value Date/Time    CALCIUM 9.7 07/19/2019 0805    ALKPHOS 93 07/19/2019 0805    AST 20 07/19/2019 0805    ALT 32 07/19/2019 0805    BILITOT 0.2 07/19/2019 0805    ESTGFRAFRICA >60.0 07/19/2019 0805    EGFRNONAA >60.0 07/19/2019 0805          Lab Results   Component Value Date    CHOL 164 07/19/2019    CHOL 176 02/06/2018    CHOL 219 (H) 11/08/2017     Lab Results   Component Value Date    HDL 59 07/19/2019    HDL 46 02/06/2018     HDL 48 11/08/2017     Lab Results   Component Value Date    LDLCALC 84.6 07/19/2019    LDLCALC 95.4 02/06/2018    LDLCALC 140.0 11/08/2017     Lab Results   Component Value Date    TRIG 102 07/19/2019    TRIG 173 (H) 02/06/2018    TRIG 155 (H) 11/08/2017     Lab Results   Component Value Date    CHOLHDL 36.0 07/19/2019    CHOLHDL 26.1 02/06/2018    CHOLHDL 21.9 11/08/2017       Lab Results   Component Value Date    MICALBCREAT 22.4 01/31/2019     Lab Results   Component Value Date    TSH 1.446 07/19/2019       CrCl cannot be calculated (Patient's most recent lab result is older than the maximum 7 days allowed.).    Vit D, 25-Hydroxy   Date Value Ref Range Status   07/19/2019 16 (L) 30 - 96 ng/mL Final     Comment:     Vitamin D deficiency.........<10 ng/mL                              Vitamin D insufficiency......10-29 ng/mL       Vitamin D sufficiency........> or equal to 30 ng/mL  Vitamin D toxicity............>100 ng/mL         PHYSICAL EXAMINATION  Constitutional: Appears well, no distress  Neck: Supple, trachea midline; no thyromegaly or nodules.   Respiratory: CTA, even and unlabored.  Cardiovascular: RRR, no murmurs, no carotid bruits. DP pulses  2+ bilaterally; no edema.    GI: bowel sounds active, no hernia noted  Skin: warm and dry; no lipohypertrophy, or acanthosis nigracans observed.  Neuro: DTR 2+ BUE/2+BLE. Previously, no loss of protective sensation via 10 gm monofilament. Mildly decreased vibratory exam, bilaterally.   Feet: appropriate footwear.      A1c target < 7%      Assessment/Plan  1. 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 A1c remains at goal.   -- continue metformin XR and Jardiance  -- Diabetic Eye Screening Photo  -- check BG 1x/day, alternating times    -- Discussed diagnosis of DM, A1c goals, progression of disease, long term complications and tx options.    2. Essential hypertension  -- controlled  -- continue lisinopril   3. Mixed  hyperlipidemia  -- controlled.   -- continue pravastatin (couldn't tolerate atorvastatin)  Lab Results   Component Value Date    LDLCALC 84.6 07/19/2019      4. Vitamin D deficiency -- begin ergocalciferol 50,000iu weekly  -- check level with RTC       FOLLOW UP  Follow up in about 6 months (around 2/8/2020).   Patient instructed to bring BG logs to each follow up   Patient encouraged to call for any BG/medication issues, concerns, or questions.    Orders Placed This Encounter   Procedures    Hemoglobin A1c    Basic metabolic panel    Vitamin D    Diabetic Eye Screening Photo

## 2019-09-05 ENCOUNTER — PATIENT OUTREACH (OUTPATIENT)
Dept: ADMINISTRATIVE | Facility: HOSPITAL | Age: 49
End: 2019-09-05

## 2019-09-29 ENCOUNTER — PATIENT MESSAGE (OUTPATIENT)
Dept: FAMILY MEDICINE | Facility: CLINIC | Age: 49
End: 2019-09-29

## 2019-09-30 ENCOUNTER — PATIENT MESSAGE (OUTPATIENT)
Dept: FAMILY MEDICINE | Facility: CLINIC | Age: 49
End: 2019-09-30

## 2019-10-01 ENCOUNTER — PATIENT MESSAGE (OUTPATIENT)
Dept: FAMILY MEDICINE | Facility: CLINIC | Age: 49
End: 2019-10-01

## 2019-10-01 ENCOUNTER — LAB VISIT (OUTPATIENT)
Dept: LAB | Facility: HOSPITAL | Age: 49
End: 2019-10-01
Attending: FAMILY MEDICINE
Payer: COMMERCIAL

## 2019-10-01 ENCOUNTER — OFFICE VISIT (OUTPATIENT)
Dept: FAMILY MEDICINE | Facility: CLINIC | Age: 49
End: 2019-10-01
Payer: COMMERCIAL

## 2019-10-01 VITALS
HEART RATE: 80 BPM | BODY MASS INDEX: 22.43 KG/M2 | TEMPERATURE: 98 F | WEIGHT: 131.38 LBS | OXYGEN SATURATION: 97 % | HEIGHT: 64 IN | SYSTOLIC BLOOD PRESSURE: 111 MMHG | DIASTOLIC BLOOD PRESSURE: 68 MMHG

## 2019-10-01 DIAGNOSIS — E55.9 VITAMIN D DEFICIENCY: ICD-10-CM

## 2019-10-01 DIAGNOSIS — G47.10 HYPERSOMNIA: ICD-10-CM

## 2019-10-01 DIAGNOSIS — D75.1 POLYCYTHEMIA: ICD-10-CM

## 2019-10-01 DIAGNOSIS — R53.83 FATIGUE, UNSPECIFIED TYPE: ICD-10-CM

## 2019-10-01 DIAGNOSIS — F51.01 PRIMARY INSOMNIA: Primary | ICD-10-CM

## 2019-10-01 DIAGNOSIS — R53.83 FATIGUE, UNSPECIFIED TYPE: Primary | ICD-10-CM

## 2019-10-01 LAB
25(OH)D3+25(OH)D2 SERPL-MCNC: 24 NG/ML (ref 30–96)
BASOPHILS # BLD AUTO: 0.09 K/UL (ref 0–0.2)
BASOPHILS NFR BLD: 0.6 % (ref 0–1.9)
DIFFERENTIAL METHOD: ABNORMAL
EOSINOPHIL # BLD AUTO: 0.3 K/UL (ref 0–0.5)
EOSINOPHIL NFR BLD: 1.9 % (ref 0–8)
ERYTHROCYTE [DISTWIDTH] IN BLOOD BY AUTOMATED COUNT: 15.1 % (ref 11.5–14.5)
HCT VFR BLD AUTO: 52.9 % (ref 37–48.5)
HGB BLD-MCNC: 16.4 G/DL (ref 12–16)
IMM GRANULOCYTES # BLD AUTO: 0.06 K/UL (ref 0–0.04)
IMM GRANULOCYTES NFR BLD AUTO: 0.4 % (ref 0–0.5)
LYMPHOCYTES # BLD AUTO: 4.1 K/UL (ref 1–4.8)
LYMPHOCYTES NFR BLD: 24.9 % (ref 18–48)
MCH RBC QN AUTO: 29.7 PG (ref 27–31)
MCHC RBC AUTO-ENTMCNC: 31 G/DL (ref 32–36)
MCV RBC AUTO: 96 FL (ref 82–98)
MONOCYTES # BLD AUTO: 1.3 K/UL (ref 0.3–1)
MONOCYTES NFR BLD: 7.8 % (ref 4–15)
NEUTROPHILS # BLD AUTO: 10.5 K/UL (ref 1.8–7.7)
NEUTROPHILS NFR BLD: 64.4 % (ref 38–73)
NRBC BLD-RTO: 0 /100 WBC
PLATELET # BLD AUTO: 394 K/UL (ref 150–350)
PLATELET BLD QL SMEAR: ABNORMAL
PMV BLD AUTO: 10.2 FL (ref 9.2–12.9)
RBC # BLD AUTO: 5.52 M/UL (ref 4–5.4)
VIT B12 SERPL-MCNC: 476 PG/ML (ref 210–950)
WBC # BLD AUTO: 16.31 K/UL (ref 3.9–12.7)
WBC TOXIC VACUOLES BLD QL SMEAR: PRESENT

## 2019-10-01 PROCEDURE — 36415 COLL VENOUS BLD VENIPUNCTURE: CPT | Mod: PO

## 2019-10-01 PROCEDURE — 82306 VITAMIN D 25 HYDROXY: CPT

## 2019-10-01 PROCEDURE — 99214 OFFICE O/P EST MOD 30 MIN: CPT | Mod: S$GLB,,, | Performed by: FAMILY MEDICINE

## 2019-10-01 PROCEDURE — 99214 PR OFFICE/OUTPT VISIT, EST, LEVL IV, 30-39 MIN: ICD-10-PCS | Mod: S$GLB,,, | Performed by: FAMILY MEDICINE

## 2019-10-01 PROCEDURE — 82607 VITAMIN B-12: CPT

## 2019-10-01 PROCEDURE — 85025 COMPLETE CBC W/AUTO DIFF WBC: CPT

## 2019-10-01 PROCEDURE — 99999 PR PBB SHADOW E&M-EST. PATIENT-LVL IV: CPT | Mod: PBBFAC,,, | Performed by: FAMILY MEDICINE

## 2019-10-01 PROCEDURE — 99999 PR PBB SHADOW E&M-EST. PATIENT-LVL IV: ICD-10-PCS | Mod: PBBFAC,,, | Performed by: FAMILY MEDICINE

## 2019-10-01 RX ORDER — TRAZODONE HYDROCHLORIDE 50 MG/1
50 TABLET ORAL NIGHTLY
Qty: 30 TABLET | Refills: 5 | Status: SHIPPED | OUTPATIENT
Start: 2019-10-01 | End: 2020-09-03

## 2019-10-01 NOTE — PROGRESS NOTES
"Subjective:       Patient ID: Kylee Burgos is a 49 y.o. female.    Chief Complaint: Fatigue and Nausea    Pt is known to me.  Kylee Burgos is a 49 y.o. female who presents today with fatigue. Onset was a few months ago.She denies any stressors, inciting incident, change in diet or medications at, or since that time. Patient has since noticed a worsening in symptoms. She uses OTC Diphenhydramine 50mg every night, enabling her to fall and stay asleep. She has used this regularly for years. Patient is experiencing day time sleepiness in addition to feeling tired despite getting adequate sleep. She has recently been spending more time in bed. Occasionally she feels that the fatigue causes her to be nauseated and have a headache. She is also very concerned because it is now affecting her mood. Patient seen to be teary today.   She is followed regularly by Dr. Sargent, in Endocrinology for diabetes, and was last seen in July 2019. No abnormalities on labs from that visit.      Review of Systems   Constitutional: Positive for malaise/fatigue. Negative for chills and fever.   HENT: Negative for congestion.    Respiratory: Negative for cough and shortness of breath.    Cardiovascular: Negative for chest pain and palpitations.   Gastrointestinal: Positive for nausea. Negative for abdominal pain, constipation, diarrhea and vomiting.   Musculoskeletal: Positive for myalgias (patient has history of fibromyalgia).   Skin: Negative for rash.   Neurological: Positive for headaches.   Psychiatric/Behavioral: The patient has insomnia. The patient is not nervous/anxious.         OBJECTIVE:     /68 (BP Location: Left arm, Patient Position: Sitting, BP Method: Medium (Manual))   Pulse 80   Temp 98.4 °F (36.9 °C) (Oral)   Ht 5' 4" (1.626 m)   Wt 59.6 kg (131 lb 6.3 oz)   SpO2 97%   BMI 22.55 kg/m²   Body mass index is 22.55 kg/m².     Physical Exam   Constitutional: She is well-developed, well-nourished, and in no " distress.   HENT:   Head: Normocephalic and atraumatic.   Cardiovascular: Normal rate, regular rhythm and normal heart sounds.   Pulmonary/Chest: Effort normal and breath sounds normal. No respiratory distress. She has no wheezes.   Abdominal: Soft. She exhibits no distension. There is no tenderness. There is no guarding.   Skin: Skin is warm and dry.   Psychiatric:   Teary-eyed.         ASSESSMENT/PLAN:  Kylee Burgos is a 49 y.o. female with fatigue.   1. Fatigue   -CBC, Future  -Vitamin B12, Future   -Sleep study, Future      2. Hypersomnia  -Sleep study, Future      3. Vitamin D Deficiency   -Vitamin D, Future          Jose Carlos Enriquez, MS4       Cosigned by: WHITNEY Yost MD at 10/2/2019  2:24 PM  Electronically signed by Jose Carlos Enriquez at 10/1/2019 10:35 AM  Electronically signed by WHITNEY Yost MD at 10/2/2019  2:24 PM      Office Visit on 10/1/2019        Revision History        Detailed Report         Review of Systems   Constitutional: Negative for activity change and unexpected weight change.   HENT: Negative for hearing loss, rhinorrhea and trouble swallowing.    Eyes: Positive for visual disturbance. Negative for discharge.   Respiratory: Negative for chest tightness and wheezing.    Cardiovascular: Negative for chest pain and palpitations.   Gastrointestinal: Negative for blood in stool, constipation, diarrhea and vomiting.   Endocrine: Negative for polydipsia and polyuria.   Genitourinary: Negative for difficulty urinating, dysuria, hematuria and menstrual problem.   Musculoskeletal: Negative for arthralgias, joint swelling and neck pain.   Neurological: Positive for headaches. Negative for weakness.   Psychiatric/Behavioral: Negative for confusion and dysphoric mood.       Objective:       Vitals:    10/01/19 0913   BP: 111/68   BP Location: Left arm   Patient Position: Sitting   BP Method: Medium (Manual)   Pulse: 80   Temp: 98.4 °F (36.9 °C)   TempSrc: Oral   SpO2: 97%  "  Weight: 59.6 kg (131 lb 6.3 oz)   Height: 5' 4" (1.626 m)     Physical Exam    Assessment:       1. Fatigue, unspecified type    2. Hypersomnia    3. Vitamin D deficiency        Plan:       Kylee was seen today for fatigue and nausea.    Diagnoses and all orders for this visit:    Fatigue, unspecified type  -     CBC auto differential; Future  -     Vitamin B12; Future  -     Home Sleep Studies; Future    Hypersomnia  -     Home Sleep Studies; Future    Vitamin D deficiency  -     Vitamin D; Future      During this visit, I reviewed the pt's history, medications, allergies, and problem list.      "

## 2019-10-01 NOTE — MEDICAL/APP STUDENT
"Progress Note        Patient Name: Kylee Burgos  YOB: 1970        SUBJECTIVE:   Presenting Complaint: Fatigue     Kylee Burgos is a 49 y.o. female who presents today with fatigue. Onset was a few months ago.She denies any stressors, inciting incident, change in diet or medications at, or since that time. Patient has since noticed a worsening in symptoms. She uses OTC Diphenhydramine 50mg every night, enabling her to fall and stay asleep. She has used this regularly for years. Patient is experiencing day time sleepiness in addition to feeling tired despite getting adequate sleep. She has recently been spending more time in bed. Occasionally she feels that the fatigue causes her to be nauseated and have a headache. She is also very concerned because it is now affecting her mood. Patient seen to be teary today.   She is followed regularly by Dr. Sargent, in Endocrinology for diabetes, and was last seen in July 2019. No abnormalities on labs from that visit.     Review of Systems   Constitutional: Positive for malaise/fatigue. Negative for chills and fever.   HENT: Negative for congestion.    Respiratory: Negative for cough and shortness of breath.    Cardiovascular: Negative for chest pain and palpitations.   Gastrointestinal: Positive for nausea. Negative for abdominal pain, constipation, diarrhea and vomiting.   Musculoskeletal: Positive for myalgias (patient has history of fibromyalgia).   Skin: Negative for rash.   Neurological: Positive for headaches.   Psychiatric/Behavioral: The patient has insomnia. The patient is not nervous/anxious.      OBJECTIVE:      /68 (BP Location: Left arm, Patient Position: Sitting, BP Method: Medium (Manual))   Pulse 80   Temp 98.4 °F (36.9 °C) (Oral)   Ht 5' 4" (1.626 m)   Wt 59.6 kg (131 lb 6.3 oz)   SpO2 97%   BMI 22.55 kg/m²   Body mass index is 22.55 kg/m².     Physical Exam   Constitutional: She is well-developed, well-nourished, and in " no distress.   HENT:   Head: Normocephalic and atraumatic.   Cardiovascular: Normal rate, regular rhythm and normal heart sounds.   Pulmonary/Chest: Effort normal and breath sounds normal. No respiratory distress. She has no wheezes.   Abdominal: Soft. She exhibits no distension. There is no tenderness. There is no guarding.   Skin: Skin is warm and dry.   Psychiatric:   Teary-eyed.       ASSESSMENT/PLAN:   Kylee Burgos is a 49 y.o. female with fatigue.   1. Fatigue   -CBC, Future  -Vitamin B12, Future   -Sleep study, Future     2. Hypersomnia  -Sleep study, Future     3. Vitamin D Deficiency   -Vitamin D, Future          Jose Carlos Enriquez, MS4

## 2019-10-02 ENCOUNTER — PATIENT MESSAGE (OUTPATIENT)
Dept: ENDOCRINOLOGY | Facility: CLINIC | Age: 49
End: 2019-10-02

## 2019-10-15 ENCOUNTER — PATIENT MESSAGE (OUTPATIENT)
Dept: FAMILY MEDICINE | Facility: CLINIC | Age: 49
End: 2019-10-15

## 2019-10-18 ENCOUNTER — PATIENT OUTREACH (OUTPATIENT)
Dept: ADMINISTRATIVE | Facility: HOSPITAL | Age: 49
End: 2019-10-18

## 2019-10-22 ENCOUNTER — PATIENT MESSAGE (OUTPATIENT)
Dept: ENDOCRINOLOGY | Facility: CLINIC | Age: 49
End: 2019-10-22

## 2019-10-22 RX ORDER — LISINOPRIL 20 MG/1
20 TABLET ORAL DAILY
Qty: 30 TABLET | Refills: 6 | Status: SHIPPED | OUTPATIENT
Start: 2019-10-22 | End: 2020-04-14 | Stop reason: SDUPTHER

## 2019-10-28 ENCOUNTER — PATIENT MESSAGE (OUTPATIENT)
Dept: ENDOCRINOLOGY | Facility: CLINIC | Age: 49
End: 2019-10-28

## 2019-10-28 DIAGNOSIS — F41.9 ANXIETY: ICD-10-CM

## 2019-10-28 DIAGNOSIS — M79.7 FIBROMYALGIA: ICD-10-CM

## 2019-10-29 RX ORDER — DULOXETIN HYDROCHLORIDE 60 MG/1
60 CAPSULE, DELAYED RELEASE ORAL DAILY
Qty: 90 CAPSULE | Refills: 3 | Status: SHIPPED | OUTPATIENT
Start: 2019-10-29 | End: 2023-09-08

## 2020-01-07 ENCOUNTER — PATIENT MESSAGE (OUTPATIENT)
Dept: ENDOCRINOLOGY | Facility: CLINIC | Age: 50
End: 2020-01-07

## 2020-01-08 ENCOUNTER — PATIENT MESSAGE (OUTPATIENT)
Dept: ENDOCRINOLOGY | Facility: CLINIC | Age: 50
End: 2020-01-08

## 2020-01-08 DIAGNOSIS — E78.2 MIXED HYPERLIPIDEMIA: ICD-10-CM

## 2020-01-08 RX ORDER — PRAVASTATIN SODIUM 20 MG/1
20 TABLET ORAL NIGHTLY
Qty: 90 TABLET | Refills: 1 | Status: SHIPPED | OUTPATIENT
Start: 2020-01-08 | End: 2020-06-26

## 2020-01-15 DIAGNOSIS — E55.9 VITAMIN D DEFICIENCY DISEASE: ICD-10-CM

## 2020-01-15 RX ORDER — BLOOD-GLUCOSE METER
EACH MISCELLANEOUS
Qty: 100 STRIP | Refills: 3 | Status: SHIPPED | OUTPATIENT
Start: 2020-01-15 | End: 2021-03-15

## 2020-01-15 RX ORDER — ERGOCALCIFEROL 1.25 MG/1
CAPSULE ORAL
Qty: 12 CAPSULE | Refills: 0 | Status: SHIPPED | OUTPATIENT
Start: 2020-01-15 | End: 2020-02-12 | Stop reason: SDUPTHER

## 2020-02-06 ENCOUNTER — LAB VISIT (OUTPATIENT)
Dept: LAB | Facility: HOSPITAL | Age: 50
End: 2020-02-06
Attending: NURSE PRACTITIONER
Payer: COMMERCIAL

## 2020-02-06 DIAGNOSIS — E55.9 VITAMIN D DEFICIENCY DISEASE: ICD-10-CM

## 2020-02-06 DIAGNOSIS — E11.40 TYPE 2 DIABETES MELLITUS WITH DIABETIC NEUROPATHY, WITHOUT LONG-TERM CURRENT USE OF INSULIN: ICD-10-CM

## 2020-02-06 LAB
ANION GAP SERPL CALC-SCNC: 10 MMOL/L (ref 8–16)
BUN SERPL-MCNC: 17 MG/DL (ref 6–20)
CALCIUM SERPL-MCNC: 9.6 MG/DL (ref 8.7–10.5)
CHLORIDE SERPL-SCNC: 106 MMOL/L (ref 95–110)
CO2 SERPL-SCNC: 28 MMOL/L (ref 23–29)
CREAT SERPL-MCNC: 0.9 MG/DL (ref 0.5–1.4)
EST. GFR  (AFRICAN AMERICAN): >60 ML/MIN/1.73 M^2
EST. GFR  (NON AFRICAN AMERICAN): >60 ML/MIN/1.73 M^2
ESTIMATED AVG GLUCOSE: 131 MG/DL (ref 68–131)
GLUCOSE SERPL-MCNC: 93 MG/DL (ref 70–110)
HBA1C MFR BLD HPLC: 6.2 % (ref 4–5.6)
POTASSIUM SERPL-SCNC: 4.4 MMOL/L (ref 3.5–5.1)
SODIUM SERPL-SCNC: 144 MMOL/L (ref 136–145)

## 2020-02-06 PROCEDURE — 83036 HEMOGLOBIN GLYCOSYLATED A1C: CPT

## 2020-02-06 PROCEDURE — 80048 BASIC METABOLIC PNL TOTAL CA: CPT

## 2020-02-06 PROCEDURE — 36415 COLL VENOUS BLD VENIPUNCTURE: CPT | Mod: PO

## 2020-02-06 PROCEDURE — 82306 VITAMIN D 25 HYDROXY: CPT

## 2020-02-07 ENCOUNTER — PATIENT MESSAGE (OUTPATIENT)
Dept: ENDOCRINOLOGY | Facility: CLINIC | Age: 50
End: 2020-02-07

## 2020-02-07 LAB — 25(OH)D3+25(OH)D2 SERPL-MCNC: 17 NG/ML (ref 30–96)

## 2020-02-10 ENCOUNTER — PATIENT OUTREACH (OUTPATIENT)
Dept: ADMINISTRATIVE | Facility: OTHER | Age: 50
End: 2020-02-10

## 2020-02-12 ENCOUNTER — OFFICE VISIT (OUTPATIENT)
Dept: ENDOCRINOLOGY | Facility: CLINIC | Age: 50
End: 2020-02-12
Payer: COMMERCIAL

## 2020-02-12 VITALS
HEIGHT: 64 IN | SYSTOLIC BLOOD PRESSURE: 110 MMHG | BODY MASS INDEX: 23.49 KG/M2 | WEIGHT: 137.56 LBS | DIASTOLIC BLOOD PRESSURE: 70 MMHG | HEART RATE: 96 BPM

## 2020-02-12 DIAGNOSIS — E55.9 VITAMIN D DEFICIENCY DISEASE: ICD-10-CM

## 2020-02-12 DIAGNOSIS — I10 ESSENTIAL HYPERTENSION: ICD-10-CM

## 2020-02-12 DIAGNOSIS — E11.40 TYPE 2 DIABETES MELLITUS WITH DIABETIC NEUROPATHY, WITHOUT LONG-TERM CURRENT USE OF INSULIN: Primary | ICD-10-CM

## 2020-02-12 DIAGNOSIS — E78.2 MIXED HYPERLIPIDEMIA: ICD-10-CM

## 2020-02-12 PROCEDURE — 99999 PR PBB SHADOW E&M-EST. PATIENT-LVL IV: ICD-10-PCS | Mod: PBBFAC,,, | Performed by: NURSE PRACTITIONER

## 2020-02-12 PROCEDURE — 99214 PR OFFICE/OUTPT VISIT, EST, LEVL IV, 30-39 MIN: ICD-10-PCS | Mod: S$GLB,,, | Performed by: NURSE PRACTITIONER

## 2020-02-12 PROCEDURE — 99214 OFFICE O/P EST MOD 30 MIN: CPT | Mod: S$GLB,,, | Performed by: NURSE PRACTITIONER

## 2020-02-12 PROCEDURE — 99999 PR PBB SHADOW E&M-EST. PATIENT-LVL IV: CPT | Mod: PBBFAC,,, | Performed by: NURSE PRACTITIONER

## 2020-02-12 RX ORDER — ERGOCALCIFEROL 1.25 MG/1
CAPSULE ORAL
Qty: 24 CAPSULE | Refills: 1 | Status: SHIPPED | OUTPATIENT
Start: 2020-02-12 | End: 2020-07-27

## 2020-02-12 NOTE — PROGRESS NOTES
CC: Ms. Kylee Burgos arrives today for management of Type 2 DM and review of chronic medical conditions, as listed in the Visit Diagnosis section of this encounter.       HPI: Ms. Kylee Burgos was diagnosed with Type 2 DM in 8/2017, after having recurrent yeast infections. A1c > 14% upon workup and she went to ER at time of diagnosis for glucose >600. C-peptide and DESMOND ab were normal. Initial treatment consisted of insulin and metformin. Jardiance and Trulicity were added soon after. She lost 25 lbs in 2017 and was able to discontinue insulin. Trulicity was later added but discontinued in 2/2019, due to A1c of 5.6% on triple therapy. + FH of DM in maternal GF. Denies hospitalizations due to DM.     Last seen by me in August.    BG readings are checked 1x/day (fasting only). Brings meter with BG ranging 106-133 with 2 outliers in 140-150s.     Hypoglycemia: No    Missing Insulin/PO medication doses: No    Exercise: Yes - recently bought treadmill. Does 35 minutes on treadmill 4 days/week.     Dietary Habits: Eats 3 meals/day. May snack on popcorn in the afternoon. Avoids sugary beverages.     She is compliant with taking ergocalciferol once weekly.       CURRENT DIABETIC MEDS: metformin XR 1000 mg BID, Jardiance 25 mg daily  Glucometer type: One Touch Verio    Previous DM treatments:  Januvia - not effective  Metformin 1000 mg - diarrhea  Farxiga - elevated glucoses when compared to Jardiance   Trulicity    Last Eye Exam: 2017. She has been advised to schedule this   Last Podiatry Exam: 2018 for ingrown toe nails    REVIEW OF SYSTEMS  Constitutional: no c/o fatigue, weakness, weight loss. + 4# weight gain  Eyes: denies visual disturbances  Cardiac: no palpitations or chest pain.  Respiratory: no cough or dyspnea.  GI: no c/o abdominal pain, nausea. Denies h/o pancreatitis.   Skin: no lesions or rashes.  Neuro: reports numbness, burning sensation to bottom of L great toe or ball of foot.   Endocrine:  "denies polyphagia, polydipsia, polyuria.      Personally reviewed Past Medical, Surgical, Social History.    Vital Signs  /70   Pulse 96   Ht 5' 4" (1.626 m)   Wt 62.4 kg (137 lb 9.1 oz)   BMI 23.61 kg/m²     Personally reviewed the below labs:    Hemoglobin A1C   Date Value Ref Range Status   02/06/2020 6.2 (H) 4.0 - 5.6 % Final     Comment:     ADA Screening Guidelines:  5.7-6.4%  Consistent with prediabetes  >or=6.5%  Consistent with diabetes  High levels of fetal hemoglobin interfere with the HbA1C  assay. Heterozygous hemoglobin variants (HbS, HgC, etc)do  not significantly interfere with this assay.   However, presence of multiple variants may affect accuracy.     07/19/2019 6.1 (H) 4.0 - 5.6 % Final     Comment:     ADA Screening Guidelines:  5.7-6.4%  Consistent with prediabetes  >or=6.5%  Consistent with diabetes  High levels of fetal hemoglobin interfere with the HbA1C  assay. Heterozygous hemoglobin variants (HbS, HgC, etc)do  not significantly interfere with this assay.   However, presence of multiple variants may affect accuracy.     01/31/2019 5.6 4.0 - 5.6 % Final     Comment:     ADA Screening Guidelines:  5.7-6.4%  Consistent with prediabetes  >or=6.5%  Consistent with diabetes  High levels of fetal hemoglobin interfere with the HbA1C  assay. Heterozygous hemoglobin variants (HbS, HgC, etc)do  not significantly interfere with this assay.   However, presence of multiple variants may affect accuracy.         Chemistry        Component Value Date/Time     02/06/2020 1505    K 4.4 02/06/2020 1505     02/06/2020 1505    CO2 28 02/06/2020 1505    BUN 17 02/06/2020 1505    CREATININE 0.9 02/06/2020 1505    GLU 93 02/06/2020 1505        Component Value Date/Time    CALCIUM 9.6 02/06/2020 1505    ALKPHOS 93 07/19/2019 0805    AST 20 07/19/2019 0805    ALT 32 07/19/2019 0805    BILITOT 0.2 07/19/2019 0805    ESTGFRAFRICA >60.0 02/06/2020 1505    EGFRNONAA >60.0 02/06/2020 1505    "       Lab Results   Component Value Date    CHOL 164 07/19/2019    CHOL 176 02/06/2018    CHOL 219 (H) 11/08/2017     Lab Results   Component Value Date    HDL 59 07/19/2019    HDL 46 02/06/2018    HDL 48 11/08/2017     Lab Results   Component Value Date    LDLCALC 84.6 07/19/2019    LDLCALC 95.4 02/06/2018    LDLCALC 140.0 11/08/2017     Lab Results   Component Value Date    TRIG 102 07/19/2019    TRIG 173 (H) 02/06/2018    TRIG 155 (H) 11/08/2017     Lab Results   Component Value Date    CHOLHDL 36.0 07/19/2019    CHOLHDL 26.1 02/06/2018    CHOLHDL 21.9 11/08/2017       Lab Results   Component Value Date    MICALBCREAT 22.4 01/31/2019     Lab Results   Component Value Date    TSH 1.446 07/19/2019       Estimated Creatinine Clearance: 64.6 mL/min (based on SCr of 0.9 mg/dL).    Vit D, 25-Hydroxy   Date Value Ref Range Status   02/06/2020 17 (L) 30 - 96 ng/mL Final     Comment:     Vitamin D deficiency.........<10 ng/mL                              Vitamin D insufficiency......10-29 ng/mL       Vitamin D sufficiency........> or equal to 30 ng/mL  Vitamin D toxicity............>100 ng/mL         PHYSICAL EXAMINATION  Constitutional: Appears well, no distress  Neck: Supple, trachea midline; no thyromegaly or nodules.   Respiratory: CTA, even and unlabored.  Cardiovascular: RRR, no murmurs, no carotid bruits. DP pulses  2+ bilaterally; no edema.    GI: bowel sounds active, no hernia noted  Skin: warm and dry; no lipohypertrophy, or acanthosis nigracans observed.  Neuro: DTR 2+ BUE/2+BLE. No loss of protective sensation via 10 gm monofilament. Mildly decreased vibratory exam, bilaterally.   Feet: appropriate footwear. No open wounds. Calluses noted to lateral aspects of halluces. Elongated toe nails.       A1c target < 7%      Assessment/Plan  1. 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 A1c remains at goal.   -- continue metformin XR and Jardiance  -- I recommended  eye exam. Eye camera was previously ordered but pt has not scheduled.  -- check BG 1x/day, alternating times    -- Discussed diagnosis of DM, A1c goals, progression of disease, long term complications and tx options.    2. Essential hypertension  -- controlled  -- continue lisinopril   3. Mixed hyperlipidemia  -- controlled.   -- continue pravastatin (couldn't tolerate atorvastatin)  Lab Results   Component Value Date    LDLCALC 84.6 07/19/2019      4. Vitamin D deficiency -- worsening  -- increase ergocalciferol to 50,000 IU twice weekly  -- check level with RTC       FOLLOW UP  Follow up in about 6 months (around 8/12/2020).   Patient instructed to bring BG logs to each follow up   Patient encouraged to call for any BG/medication issues, concerns, or questions.    Orders Placed This Encounter   Procedures    Hemoglobin A1c    Comprehensive metabolic panel    Lipid panel    Microalbumin/creatinine urine ratio    Vitamin D    [facility] DIABETES FOOT EXAM

## 2020-03-09 DIAGNOSIS — E11.40 TYPE 2 DIABETES MELLITUS WITH DIABETIC NEUROPATHY, WITHOUT LONG-TERM CURRENT USE OF INSULIN: ICD-10-CM

## 2020-03-13 DIAGNOSIS — E11.40 TYPE 2 DIABETES MELLITUS WITH DIABETIC NEUROPATHY, WITHOUT LONG-TERM CURRENT USE OF INSULIN: ICD-10-CM

## 2020-03-13 RX ORDER — METFORMIN HYDROCHLORIDE 500 MG/1
1000 TABLET, EXTENDED RELEASE ORAL 2 TIMES DAILY WITH MEALS
Qty: 120 TABLET | Refills: 11 | Status: SHIPPED | OUTPATIENT
Start: 2020-03-13 | End: 2021-03-01

## 2020-03-16 ENCOUNTER — TELEPHONE (OUTPATIENT)
Dept: ENDOCRINOLOGY | Facility: CLINIC | Age: 50
End: 2020-03-16

## 2020-03-16 ENCOUNTER — PATIENT MESSAGE (OUTPATIENT)
Dept: ENDOCRINOLOGY | Facility: CLINIC | Age: 50
End: 2020-03-16

## 2020-04-14 RX ORDER — LISINOPRIL 20 MG/1
20 TABLET ORAL DAILY
Qty: 90 TABLET | Refills: 3 | Status: SHIPPED | OUTPATIENT
Start: 2020-04-14 | End: 2021-09-23 | Stop reason: SDUPTHER

## 2020-05-05 ENCOUNTER — PATIENT MESSAGE (OUTPATIENT)
Dept: ADMINISTRATIVE | Facility: HOSPITAL | Age: 50
End: 2020-05-05

## 2020-05-20 ENCOUNTER — PATIENT MESSAGE (OUTPATIENT)
Dept: ADMINISTRATIVE | Facility: HOSPITAL | Age: 50
End: 2020-05-20

## 2020-05-21 ENCOUNTER — PATIENT MESSAGE (OUTPATIENT)
Dept: ENDOCRINOLOGY | Facility: CLINIC | Age: 50
End: 2020-05-21

## 2020-06-01 ENCOUNTER — PATIENT MESSAGE (OUTPATIENT)
Dept: ENDOCRINOLOGY | Facility: CLINIC | Age: 50
End: 2020-06-01

## 2020-06-04 ENCOUNTER — PATIENT MESSAGE (OUTPATIENT)
Dept: ENDOCRINOLOGY | Facility: CLINIC | Age: 50
End: 2020-06-04

## 2020-07-01 ENCOUNTER — PATIENT MESSAGE (OUTPATIENT)
Dept: ENDOCRINOLOGY | Facility: CLINIC | Age: 50
End: 2020-07-01

## 2020-07-02 ENCOUNTER — PATIENT MESSAGE (OUTPATIENT)
Dept: ENDOCRINOLOGY | Facility: CLINIC | Age: 50
End: 2020-07-02

## 2020-07-07 ENCOUNTER — LAB VISIT (OUTPATIENT)
Dept: LAB | Facility: HOSPITAL | Age: 50
End: 2020-07-07
Attending: NURSE PRACTITIONER
Payer: COMMERCIAL

## 2020-07-07 DIAGNOSIS — E11.40 TYPE 2 DIABETES MELLITUS WITH DIABETIC NEUROPATHY, WITHOUT LONG-TERM CURRENT USE OF INSULIN: ICD-10-CM

## 2020-07-07 LAB
ALBUMIN/CREAT UR: 32.5 UG/MG (ref 0–30)
CREAT UR-MCNC: 77 MG/DL (ref 15–325)
MICROALBUMIN UR DL<=1MG/L-MCNC: 25 UG/ML

## 2020-07-07 PROCEDURE — 82043 UR ALBUMIN QUANTITATIVE: CPT

## 2020-07-15 ENCOUNTER — PATIENT OUTREACH (OUTPATIENT)
Dept: ADMINISTRATIVE | Facility: OTHER | Age: 50
End: 2020-07-15

## 2020-07-15 DIAGNOSIS — Z12.31 SCREENING MAMMOGRAM FOR HIGH-RISK PATIENT: Primary | ICD-10-CM

## 2020-07-15 DIAGNOSIS — Z12.31 ENCOUNTER FOR SCREENING MAMMOGRAM FOR MALIGNANT NEOPLASM OF BREAST: ICD-10-CM

## 2020-07-15 NOTE — LETTER
AUTHORIZATION FOR RELEASE OF   CONFIDENTIAL INFORMATION    Dear [facility],    We are seeing Klyee Burgos, date of birth 1970, in the clinic at [facility]. MAXX Yost MD is the patient's PCP. Kylee Burgos has an outstanding lab/procedure at the time we reviewed her chart. In order to help keep her health information updated, she has authorized us to request the following medical record(s):        (  X)  MAMMOGRAM                                      (  )  COLONOSCOPY      (  )  PAP SMEAR                                          (  )  OUTSIDE LAB RESULTS     (  )  DEXA SCAN                                          (  )  EYE EXAM            (  )  FOOT EXAM                                          (  )  ENTIRE RECORD     (  )  OUTSIDE IMMUNIZATIONS                 (  )  _______________                                  Please fax to [facility] @ [phone].    Thank you for your assistance in our patient's healthcare.     Sincerely,     Kendra Milligan, POE CCC

## 2020-07-15 NOTE — PROGRESS NOTES
Requested updates within Care Everywhere.  Patient's chart was reviewed for overdue [facility] topics.  Immunizations reconciled.    Orders placed:screening mammogram  Tasked appts:mammogram  Labs Linked:n/a

## 2020-08-14 DIAGNOSIS — Z12.11 COLON CANCER SCREENING: ICD-10-CM

## 2020-08-31 ENCOUNTER — PATIENT MESSAGE (OUTPATIENT)
Dept: ENDOCRINOLOGY | Facility: CLINIC | Age: 50
End: 2020-08-31

## 2020-09-01 ENCOUNTER — PATIENT OUTREACH (OUTPATIENT)
Dept: ADMINISTRATIVE | Facility: OTHER | Age: 50
End: 2020-09-01

## 2020-09-01 ENCOUNTER — PATIENT MESSAGE (OUTPATIENT)
Dept: ENDOCRINOLOGY | Facility: CLINIC | Age: 50
End: 2020-09-01

## 2020-09-02 NOTE — PROGRESS NOTES
Health Maintenance Due   Topic Date Due    Eye Exam  07/30/1979    HIV Screening  07/30/1984    TETANUS VACCINE  07/30/1987    Pneumococcal Vaccine (Medium Risk) (1 of 1 - PPSV23) 07/30/1988    Mammogram  07/30/2009    Shingles Vaccine (1 of 2) 07/30/2019    Colorectal Cancer Screening  07/30/2019    Influenza Vaccine (1) 09/01/2020     Updates were requested from care everywhere.  Chart was reviewed for overdue [facility] ([facility]) topics (CRS, Breast Cancer Screening, Eye exam)  Health Maintenance has been updated.  LINKS immunization registry triggered.  Immunizations were not able to be reconciled. Patient not found in LINKS.  Order for mammogram previously placed.

## 2020-09-03 ENCOUNTER — OFFICE VISIT (OUTPATIENT)
Dept: ENDOCRINOLOGY | Facility: CLINIC | Age: 50
End: 2020-09-03
Payer: COMMERCIAL

## 2020-09-03 VITALS
DIASTOLIC BLOOD PRESSURE: 70 MMHG | WEIGHT: 140.44 LBS | SYSTOLIC BLOOD PRESSURE: 120 MMHG | HEART RATE: 105 BPM | HEIGHT: 64 IN | BODY MASS INDEX: 23.98 KG/M2

## 2020-09-03 DIAGNOSIS — I10 ESSENTIAL HYPERTENSION: ICD-10-CM

## 2020-09-03 DIAGNOSIS — R22.9 SOFT TISSUE SWELLING: ICD-10-CM

## 2020-09-03 DIAGNOSIS — E55.9 VITAMIN D DEFICIENCY DISEASE: ICD-10-CM

## 2020-09-03 DIAGNOSIS — E78.2 MIXED HYPERLIPIDEMIA: ICD-10-CM

## 2020-09-03 DIAGNOSIS — E11.40 TYPE 2 DIABETES MELLITUS WITH DIABETIC NEUROPATHY, WITHOUT LONG-TERM CURRENT USE OF INSULIN: Primary | ICD-10-CM

## 2020-09-03 PROCEDURE — 99999 PR PBB SHADOW E&M-EST. PATIENT-LVL IV: CPT | Mod: PBBFAC,,, | Performed by: NURSE PRACTITIONER

## 2020-09-03 PROCEDURE — 99214 PR OFFICE/OUTPT VISIT, EST, LEVL IV, 30-39 MIN: ICD-10-PCS | Mod: S$GLB,,, | Performed by: NURSE PRACTITIONER

## 2020-09-03 PROCEDURE — 99999 PR PBB SHADOW E&M-EST. PATIENT-LVL IV: ICD-10-PCS | Mod: PBBFAC,,, | Performed by: NURSE PRACTITIONER

## 2020-09-03 PROCEDURE — 99214 OFFICE O/P EST MOD 30 MIN: CPT | Mod: S$GLB,,, | Performed by: NURSE PRACTITIONER

## 2020-09-03 NOTE — PROGRESS NOTES
"CC: Ms. Kylee Burgos arrives today for management of Type 2 DM and review of chronic medical conditions, as listed in the Visit Diagnosis section of this encounter.       HPI: Ms. Kylee Burgos was diagnosed with Type 2 DM in 8/2017, after having recurrent yeast infections. A1c > 14% upon workup and she went to ER at time of diagnosis for glucose >600. C-peptide and DESMOND ab were normal. Initial treatment consisted of insulin and metformin. Jardiance and Trulicity were added soon after. She lost 25 lbs in 2017 and was able to discontinue insulin. Trulicity was later added but discontinued in 2/2019, due to A1c of 5.6% on triple therapy. + FH of DM in maternal GF. Denies hospitalizations due to DM.     Last seen by me in February.    BG readings are checked a few times/week. No logs or meter for review.  She reports these range 118-130.    Hypoglycemia: No    Missing Insulin/PO medication doses: No    Exercise: No. She owns a treadmill but doesn't use it.     Dietary Habits: Eats 3 meals/day. Occasional snack. Avoids sugary beverages.     She states that she hadn't increased ergocalciferol to twice weekly until after her July labs.    Of note, patient has scleroderma. Doesn't follow with Rheum.     CURRENT DIABETIC MEDS: metformin XR 1000 mg BID, Jardiance 25 mg daily  Glucometer type: One Touch Verio    Previous DM treatments:  Januvia - not effective  Metformin 1000 mg - diarrhea  Farxiga - elevated glucoses when compared to Jardiance   Trulicity    Last Eye Exam: 2017. She has been advised to schedule this   Last Podiatry Exam: 2018 for ingrown toe nails    REVIEW OF SYSTEMS  Constitutional: no c/o fatigue, weakness, weight loss.   Eyes: denies visual disturbances  Cardiac: no palpitations or chest pain.  Respiratory: no cough or dyspnea.  GI: no c/o abdominal pain, nausea. Denies h/o pancreatitis.   Skin: no lesions or rashes.  Musculoskeletal: patient notices generalized "swelling" above collarbone. She " "states that this has come and gone for the past year. It is not painful.   Neuro: denies numbness, tingling, paresthesias  Endocrine: denies polyphagia, polydipsia, polyuria.      Personally reviewed Past Medical, Surgical, Social History.    Vital Signs  /70   Pulse 105   Ht 5' 4" (1.626 m)   Wt 63.7 kg (140 lb 6.9 oz)   BMI 24.11 kg/m²     Personally reviewed the below labs:    Hemoglobin A1C   Date Value Ref Range Status   07/07/2020 6.2 (H) 4.0 - 5.6 % Final     Comment:     ADA Screening Guidelines:  5.7-6.4%  Consistent with prediabetes  >or=6.5%  Consistent with diabetes  High levels of fetal hemoglobin interfere with the HbA1C  assay. Heterozygous hemoglobin variants (HbS, HgC, etc)do  not significantly interfere with this assay.   However, presence of multiple variants may affect accuracy.     02/06/2020 6.2 (H) 4.0 - 5.6 % Final     Comment:     ADA Screening Guidelines:  5.7-6.4%  Consistent with prediabetes  >or=6.5%  Consistent with diabetes  High levels of fetal hemoglobin interfere with the HbA1C  assay. Heterozygous hemoglobin variants (HbS, HgC, etc)do  not significantly interfere with this assay.   However, presence of multiple variants may affect accuracy.     07/19/2019 6.1 (H) 4.0 - 5.6 % Final     Comment:     ADA Screening Guidelines:  5.7-6.4%  Consistent with prediabetes  >or=6.5%  Consistent with diabetes  High levels of fetal hemoglobin interfere with the HbA1C  assay. Heterozygous hemoglobin variants (HbS, HgC, etc)do  not significantly interfere with this assay.   However, presence of multiple variants may affect accuracy.         Chemistry        Component Value Date/Time     07/07/2020 0729    K 3.8 07/07/2020 0729     07/07/2020 0729    CO2 26 07/07/2020 0729    BUN 11 07/07/2020 0729    CREATININE 0.8 07/07/2020 0729     (H) 07/07/2020 0729        Component Value Date/Time    CALCIUM 9.4 07/07/2020 0729    ALKPHOS 84 07/07/2020 0729    AST 14 07/07/2020 " 0729    ALT 28 07/07/2020 0729    BILITOT 0.3 07/07/2020 0729    ESTGFRAFRICA >60.0 07/07/2020 0729    EGFRNONAA >60.0 07/07/2020 0729          Lab Results   Component Value Date    CHOL 186 07/07/2020    CHOL 164 07/19/2019    CHOL 176 02/06/2018     Lab Results   Component Value Date    HDL 61 07/07/2020    HDL 59 07/19/2019    HDL 46 02/06/2018     Lab Results   Component Value Date    LDLCALC 96.8 07/07/2020    LDLCALC 84.6 07/19/2019    LDLCALC 95.4 02/06/2018     Lab Results   Component Value Date    TRIG 141 07/07/2020    TRIG 102 07/19/2019    TRIG 173 (H) 02/06/2018     Lab Results   Component Value Date    CHOLHDL 32.8 07/07/2020    CHOLHDL 36.0 07/19/2019    CHOLHDL 26.1 02/06/2018       Lab Results   Component Value Date    MICALBCREAT 32.5 (H) 07/07/2020     Lab Results   Component Value Date    TSH 1.446 07/19/2019       CrCl cannot be calculated (Patient's most recent lab result is older than the maximum 7 days allowed.).    Vit D, 25-Hydroxy   Date Value Ref Range Status   07/07/2020 17 (L) 30 - 96 ng/mL Final     Comment:     Vitamin D deficiency.........<10 ng/mL                              Vitamin D insufficiency......10-29 ng/mL       Vitamin D sufficiency........> or equal to 30 ng/mL  Vitamin D toxicity............>100 ng/mL         PHYSICAL EXAMINATION  Constitutional: Appears well, no distress  Neck: Supple, trachea midline; no thyromegaly or nodules.   Respiratory: CTA, even and unlabored.  Cardiovascular: RRR, no murmurs, no carotid bruits. DP pulses  2+ bilaterally; no edema.    GI: bowel sounds active, no hernia noted  Skin: warm and dry; no lipohypertrophy, or acanthosis nigracans observed.  Musculoskeletal: diffuse swelling to R supraclavicular area, extending to shoulder. Somewhat on L side, as well. Supraclavicular lymph nodes are not easily palpable. Non-tender.   Neuro: DTR 2+ BUE/2+BLE. Previously, no loss of protective sensation via 10 gm monofilament. Mildly decreased vibratory  exam, bilaterally.   Feet: appropriate footwear.       A1c target < 7%      Assessment/Plan  1. 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 A1c remains at goal.   -- continue metformin XR and Jardiance  -- I recommended eye exam. Patient is amenable to this.   -- check BG 1x/day, alternating times    -- Discussed diagnosis of DM, A1c goals, progression of disease, long term complications and tx options.    2. Essential hypertension  -- controlled  -- continue lisinopril   3. Mixed hyperlipidemia  -- controlled.   -- continue pravastatin (couldn't tolerate atorvastatin)  Lab Results   Component Value Date    LDLCALC 96.8 07/07/2020      4. Vitamin D deficiency -- uncontrolled. Patient has increased supplementation since July labs resulted.  -- continue ergocalciferol 50,000 IU twice weekly  -- check level with RTC   5. Soft tissue swelling -- possibly musculoskeletal but lymph node involvement should be ruled out, due to tobacco use  -- will refer to PCP, as patient also requests to establish care with PCP in [facility].        FOLLOW UP  Follow up in about 6 months (around 3/3/2021).   Patient instructed to bring BG logs to each follow up   Patient encouraged to call for any BG/medication issues, concerns, or questions.    Orders Placed This Encounter   Procedures    Hemoglobin A1C    Comprehensive metabolic panel    Vitamin D

## 2020-09-08 ENCOUNTER — LAB VISIT (OUTPATIENT)
Dept: LAB | Facility: HOSPITAL | Age: 50
End: 2020-09-08
Attending: INTERNAL MEDICINE
Payer: COMMERCIAL

## 2020-09-08 ENCOUNTER — OFFICE VISIT (OUTPATIENT)
Dept: FAMILY MEDICINE | Facility: CLINIC | Age: 50
End: 2020-09-08
Payer: COMMERCIAL

## 2020-09-08 VITALS
WEIGHT: 139.44 LBS | HEIGHT: 64 IN | SYSTOLIC BLOOD PRESSURE: 120 MMHG | HEART RATE: 82 BPM | TEMPERATURE: 98 F | BODY MASS INDEX: 23.81 KG/M2 | RESPIRATION RATE: 18 BRPM | OXYGEN SATURATION: 98 % | DIASTOLIC BLOOD PRESSURE: 82 MMHG

## 2020-09-08 DIAGNOSIS — Z00.00 PREVENTATIVE HEALTH CARE: ICD-10-CM

## 2020-09-08 DIAGNOSIS — E55.9 VITAMIN D DEFICIENCY DISEASE: ICD-10-CM

## 2020-09-08 DIAGNOSIS — D72.829 LEUKOCYTOSIS, UNSPECIFIED TYPE: ICD-10-CM

## 2020-09-08 DIAGNOSIS — E11.40 TYPE 2 DIABETES MELLITUS WITH DIABETIC NEUROPATHY, WITHOUT LONG-TERM CURRENT USE OF INSULIN: ICD-10-CM

## 2020-09-08 DIAGNOSIS — R22.1 LOCALIZED SWELLING, MASS AND LUMP, NECK: ICD-10-CM

## 2020-09-08 DIAGNOSIS — R22.1 LOCALIZED SWELLING, MASS AND LUMP, NECK: Primary | ICD-10-CM

## 2020-09-08 LAB — PATH REV BLD -IMP: NORMAL

## 2020-09-08 PROCEDURE — 84443 ASSAY THYROID STIM HORMONE: CPT

## 2020-09-08 PROCEDURE — 85025 COMPLETE CBC W/AUTO DIFF WBC: CPT

## 2020-09-08 PROCEDURE — 99999 PR PBB SHADOW E&M-EST. PATIENT-LVL V: CPT | Mod: PBBFAC,,, | Performed by: INTERNAL MEDICINE

## 2020-09-08 PROCEDURE — 86803 HEPATITIS C AB TEST: CPT

## 2020-09-08 PROCEDURE — 85060 PATHOLOGIST REVIEW: ICD-10-PCS | Mod: ,,, | Performed by: PATHOLOGY

## 2020-09-08 PROCEDURE — 80053 COMPREHEN METABOLIC PANEL: CPT

## 2020-09-08 PROCEDURE — 36415 COLL VENOUS BLD VENIPUNCTURE: CPT | Mod: PN

## 2020-09-08 PROCEDURE — 99999 PR PBB SHADOW E&M-EST. PATIENT-LVL V: ICD-10-PCS | Mod: PBBFAC,,, | Performed by: INTERNAL MEDICINE

## 2020-09-08 PROCEDURE — 99214 PR OFFICE/OUTPT VISIT, EST, LEVL IV, 30-39 MIN: ICD-10-PCS | Mod: S$GLB,,, | Performed by: INTERNAL MEDICINE

## 2020-09-08 PROCEDURE — 82306 VITAMIN D 25 HYDROXY: CPT

## 2020-09-08 PROCEDURE — 99214 OFFICE O/P EST MOD 30 MIN: CPT | Mod: S$GLB,,, | Performed by: INTERNAL MEDICINE

## 2020-09-08 PROCEDURE — 86703 HIV-1/HIV-2 1 RESULT ANTBDY: CPT

## 2020-09-08 PROCEDURE — 83036 HEMOGLOBIN GLYCOSYLATED A1C: CPT

## 2020-09-08 PROCEDURE — 85060 BLOOD SMEAR INTERPRETATION: CPT | Mod: ,,, | Performed by: PATHOLOGY

## 2020-09-08 NOTE — PROGRESS NOTES
Assessment and Plan:    1. Localized swelling, mass and lump, neck  Supraclavicular fullness bilaterally without a mass palpable. With persistent leukocytosis on previous labs, will evaluate with repeat CBC and peripheral smear. Thyroid normal last year. Obtain US.   - CBC auto differential; Future  - TSH; Future  - Pathologist Interpretation Differential; Future  - US Soft Tissue Head Neck Thyroid; Future    2. Leukocytosis, unspecified type  - CBC auto differential; Future  - Pathologist Interpretation Differential; Future    3. Preventative health care  - HIV 1/2 Ag/Ab (4th Gen); Future  - Hepatitis C Antibody; Future    DIS on 21 for mammogram  Has cologuard at home, will send it in.    Discussed vaccines, defer for today    Reminded patient to schedule eye exam. We discussed [facility] as she has been there before.  ______________________________________________________________________  Subjective:    Chief Complaint:  Establish care, lump in neck    HPI:  Kylee is a 50 y.o. year old woman here to establish care and discuss lump in neck.     She has noticed some swelling on her right side of her neck for several months. Seems to be larger some days than other days. No pain with this. She has not ever felt any actual lump. Some swelling on the left side as well, but not as prominent. No fevers or night sweats. Has fatigue but this is not new. No unexpected weight loss.     She has been diagnosed with scleroderma and fibromyalgia. Does not currently see a Rheumatologist. Taking cymbalta daily and Aleve PRN.     Taking lisinopril for HTN.     Taking pravastatin for HLD.     Seeing Endocrine for DM. Taking metformin and jardiance. Controlled.     Taking ergocalciferol twice weekly. Recently dose increased as vit D was still quite low.    Past Medical History:  Past Medical History:   Diagnosis Date    Diabetes mellitus, type 2     Fibromyalgia     Hyperlipidemia     Hypertension     Scleroderma        Past  "Surgical History:  Past Surgical History:   Procedure Laterality Date     SECTION      HYSTERECTOMY         Family History:  Family History   Problem Relation Age of Onset    Heart disease Mother     Hypertension Mother     Lupus Paternal Aunt        Social History:  Social History     Socioeconomic History    Marital status:      Spouse name: Not on file    Number of children: Not on file    Years of education: Not on file    Highest education level: Not on file   Occupational History    Not on file   Social Needs    Financial resource strain: Not very hard    Food insecurity     Worry: Never true     Inability: Never true    Transportation needs     Medical: No     Non-medical: No   Tobacco Use    Smoking status: Current Every Day Smoker    Smokeless tobacco: Never Used   Substance and Sexual Activity    Alcohol use: Yes     Frequency: Never     Drinks per session: Patient refused     Binge frequency: Never     Comment: socially    Drug use: No    Sexual activity: Not on file   Lifestyle    Physical activity     Days per week: 4 days     Minutes per session: 40 min    Stress: Only a little   Relationships    Social connections     Talks on phone: More than three times a week     Gets together: Once a week     Attends Jewish service: Not on file     Active member of club or organization: No     Attends meetings of clubs or organizations: Never     Relationship status:    Other Topics Concern    Not on file   Social History Narrative    Not on file       Medications:  Current Outpatient Medications on File Prior to Visit   Medication Sig Dispense Refill    BD INSULIN PEN NEEDLE UF MINI 31 gauge x 3/16" Ndle       blood sugar diagnostic Strp To check BG 1 time daily, to use with insurance preferred meter 100 each 11    blood-glucose meter kit To check BG 1 time daily, to use with insurance preferred meter 1 each 0    DULoxetine (CYMBALTA) 60 MG capsule Take 1 " "capsule (60 mg total) by mouth once daily. 90 capsule 3    empagliflozin (JARDIANCE) 25 mg Tab Take 25 mg by mouth once daily. 30 tablet 11    ergocalciferol (ERGOCALCIFEROL) 50,000 unit Cap TAKE 1 TABLET BY MOUTH TWICE WEEKLY 24 capsule 1    lancets 33 gauge Misc 1 lancet by Misc.(Non-Drug; Combo Route) route 4 (four) times daily.      lancets Misc To check BG 1 time daily, to use with insurance preferred meter 100 each 11    lisinopriL (PRINIVIL,ZESTRIL) 20 MG tablet Take 1 tablet (20 mg total) by mouth once daily. 90 tablet 3    metFORMIN (GLUCOPHAGE-XR) 500 MG XR 24hr tablet Take 2 tablets (1,000 mg total) by mouth 2 (two) times daily with meals. 120 tablet 11    ondansetron (ZOFRAN-ODT) 8 MG TbDL Take 1 tablet (8 mg total) by mouth every 8 (eight) hours as needed. 15 tablet 1    ONETOUCH VERIO Strp TEST ONCE DAILY 100 strip 3    pravastatin (PRAVACHOL) 20 MG tablet TAKE 1 TABLET BY MOUTH EVERY DAY IN THE EVENING 90 tablet 1    valacyclovir (VALTREX) 1000 MG tablet Take 1,000 mg by mouth once daily.        No current facility-administered medications on file prior to visit.        Allergies:  Patient has no known allergies.    Immunizations:    There is no immunization history on file for this patient.    Review of Systems:  Review of Systems   Constitutional: Positive for fatigue. Negative for chills and fever.   Respiratory: Negative for cough, chest tightness, shortness of breath and wheezing.    Musculoskeletal: Negative for neck pain.   Skin: Negative for rash and wound.        + scleroderma, unchanged skin   Neurological: Negative for dizziness and light-headedness.   Hematological: Does not bruise/bleed easily.       Objective:    Vitals:  Vitals:    09/08/20 0802   BP: 120/82   Pulse: 82   Resp: 18   Temp: 97.7 °F (36.5 °C)   TempSrc: Temporal   SpO2: 98%   Weight: 63.3 kg (139 lb 7.1 oz)   Height: 5' 4" (1.626 m)   PainSc: 0-No pain       Physical Exam  Vitals signs reviewed.   Constitutional: "       General: She is not in acute distress.     Appearance: She is well-developed.   Eyes:      General: No scleral icterus.  Neck:     Cardiovascular:      Rate and Rhythm: Normal rate and regular rhythm.      Heart sounds: No murmur.   Pulmonary:      Effort: Pulmonary effort is normal. No respiratory distress.      Breath sounds: Normal breath sounds. No wheezing.   Skin:     General: Skin is warm and dry.   Neurological:      Mental Status: She is alert and oriented to person, place, and time.   Psychiatric:         Behavior: Behavior normal.         Body mass index is 23.94 kg/m².      Data:  Previous labs reviewed and pertinent for leukocytosis on both labs in 2014 and 2019.        Theresa Crane MD  Internal Medicine

## 2020-09-08 NOTE — LETTER
September 8, 2020      BISHOP Palomares,FNP-C  [address]  [place] [address]           [facility]  [address]  [place] [address]  Phone: [phone]  Fax: [phone]          Patient: Kylee Burgos   MR Number: [id number]   YOB: 1970   Date of Visit: 9/8/2020       Dear Rosetta Schneider:    Thank you for referring Kylee Burgos to me for evaluation. Attached you will find relevant portions of my assessment and plan of care.    If you have questions, please do not hesitate to call me. I look forward to following Kylee Burgos along with you.    Sincerely,    Theresa Crane MD    Enclosure  CC:  No Recipients    If you would like to receive this communication electronically, please contact [email or web address] or [phone] to request more information on [employer] Link access.    For providers and/or their staff who would like to refer a patient to [facility], please contact us through our one-stop-shop provider referral line, [facility], at [phone].    If you feel you have received this communication in error or would no longer like to receive these types of communications, please e-mail [email or web address]

## 2020-09-09 ENCOUNTER — PATIENT MESSAGE (OUTPATIENT)
Dept: FAMILY MEDICINE | Facility: CLINIC | Age: 50
End: 2020-09-09

## 2020-09-09 DIAGNOSIS — E11.40 TYPE 2 DIABETES MELLITUS WITH DIABETIC NEUROPATHY, WITHOUT LONG-TERM CURRENT USE OF INSULIN: ICD-10-CM

## 2020-09-09 DIAGNOSIS — E55.9 VITAMIN D DEFICIENCY DISEASE: Primary | ICD-10-CM

## 2020-09-09 LAB
25(OH)D3+25(OH)D2 SERPL-MCNC: 41 NG/ML (ref 30–96)
ALBUMIN SERPL BCP-MCNC: 3.7 G/DL (ref 3.5–5.2)
ALP SERPL-CCNC: 74 U/L (ref 55–135)
ALT SERPL W/O P-5'-P-CCNC: 30 U/L (ref 10–44)
ANION GAP SERPL CALC-SCNC: 11 MMOL/L (ref 8–16)
ANISOCYTOSIS BLD QL SMEAR: SLIGHT
AST SERPL-CCNC: 17 U/L (ref 10–40)
BASO STIPL BLD QL SMEAR: ABNORMAL
BASOPHILS # BLD AUTO: 0.08 K/UL (ref 0–0.2)
BASOPHILS NFR BLD: 0.6 % (ref 0–1.9)
BILIRUB SERPL-MCNC: 0.2 MG/DL (ref 0.1–1)
BUN SERPL-MCNC: 17 MG/DL (ref 6–20)
BURR CELLS BLD QL SMEAR: ABNORMAL
CALCIUM SERPL-MCNC: 9.8 MG/DL (ref 8.7–10.5)
CHLORIDE SERPL-SCNC: 107 MMOL/L (ref 95–110)
CO2 SERPL-SCNC: 24 MMOL/L (ref 23–29)
CREAT SERPL-MCNC: 0.7 MG/DL (ref 0.5–1.4)
DIFFERENTIAL METHOD: ABNORMAL
EOSINOPHIL # BLD AUTO: 0.3 K/UL (ref 0–0.5)
EOSINOPHIL NFR BLD: 2 % (ref 0–8)
ERYTHROCYTE [DISTWIDTH] IN BLOOD BY AUTOMATED COUNT: 14.7 % (ref 11.5–14.5)
EST. GFR  (AFRICAN AMERICAN): >60 ML/MIN/1.73 M^2
EST. GFR  (NON AFRICAN AMERICAN): >60 ML/MIN/1.73 M^2
ESTIMATED AVG GLUCOSE: 126 MG/DL (ref 68–131)
GLUCOSE SERPL-MCNC: 112 MG/DL (ref 70–110)
HBA1C MFR BLD HPLC: 6 % (ref 4–5.6)
HCT VFR BLD AUTO: 50.7 % (ref 37–48.5)
HCV AB SERPL QL IA: NEGATIVE
HGB BLD-MCNC: 15.6 G/DL (ref 12–16)
HIV 1+2 AB+HIV1 P24 AG SERPL QL IA: NEGATIVE
IMM GRANULOCYTES # BLD AUTO: 0.05 K/UL (ref 0–0.04)
IMM GRANULOCYTES NFR BLD AUTO: 0.4 % (ref 0–0.5)
LYMPHOCYTES # BLD AUTO: 4.2 K/UL (ref 1–4.8)
LYMPHOCYTES NFR BLD: 32.2 % (ref 18–48)
MCH RBC QN AUTO: 30.4 PG (ref 27–31)
MCHC RBC AUTO-ENTMCNC: 30.8 G/DL (ref 32–36)
MCV RBC AUTO: 99 FL (ref 82–98)
MONOCYTES # BLD AUTO: 0.9 K/UL (ref 0.3–1)
MONOCYTES NFR BLD: 7.1 % (ref 4–15)
NEUTROPHILS # BLD AUTO: 7.5 K/UL (ref 1.8–7.7)
NEUTROPHILS NFR BLD: 57.7 % (ref 38–73)
NRBC BLD-RTO: 0 /100 WBC
PATH REV BLD -IMP: NORMAL
PLATELET # BLD AUTO: 383 K/UL (ref 150–350)
PLATELET BLD QL SMEAR: ABNORMAL
PMV BLD AUTO: 10.3 FL (ref 9.2–12.9)
POIKILOCYTOSIS BLD QL SMEAR: ABNORMAL
POLYCHROMASIA BLD QL SMEAR: ABNORMAL
POTASSIUM SERPL-SCNC: 3.9 MMOL/L (ref 3.5–5.1)
PROT SERPL-MCNC: 7.3 G/DL (ref 6–8.4)
RBC # BLD AUTO: 5.13 M/UL (ref 4–5.4)
SODIUM SERPL-SCNC: 142 MMOL/L (ref 136–145)
TSH SERPL DL<=0.005 MIU/L-ACNC: 1.25 UIU/ML (ref 0.4–4)
WBC # BLD AUTO: 12.93 K/UL (ref 3.9–12.7)

## 2020-09-13 ENCOUNTER — PATIENT MESSAGE (OUTPATIENT)
Dept: FAMILY MEDICINE | Facility: CLINIC | Age: 50
End: 2020-09-13

## 2020-09-13 DIAGNOSIS — D72.820 ATYPICAL LYMPHOCYTOSIS: Primary | ICD-10-CM

## 2020-09-13 DIAGNOSIS — R71.8 ANISOCYTOSIS: ICD-10-CM

## 2020-09-15 ENCOUNTER — TELEPHONE (OUTPATIENT)
Dept: HEMATOLOGY/ONCOLOGY | Facility: CLINIC | Age: 50
End: 2020-09-15

## 2020-09-15 NOTE — TELEPHONE ENCOUNTER
----- Message from Parminder Samuels sent at 9/15/2020 10:24 AM CDT -----  Regarding: advice  Contact: self  Type: Needs Medical Advice  Who Called:  self   Symptoms (please be specific):    How long has patient had these symptoms:    Pharmacy name and phone #:    Best Call Back Number: [phone]  Additional Information: Patient asking if someone can accompany the pt for her appointment.

## 2020-09-15 NOTE — TELEPHONE ENCOUNTER
Spoke with pt regarding her message. Pt notified she could have 1 person with her for her appt. Dr Mitchell will go through the results of her labs and imaging and discuss a plan with pt. Pt verbalized understanding.

## 2020-09-15 NOTE — TELEPHONE ENCOUNTER
Results discussed with patient. Recommended Hematology evaluation. Please call patient to set up apt or give her instructions for how to set this up.

## 2020-09-16 ENCOUNTER — HOSPITAL ENCOUNTER (OUTPATIENT)
Dept: RADIOLOGY | Facility: HOSPITAL | Age: 50
Discharge: HOME | End: 2020-09-16
Attending: INTERNAL MEDICINE
Payer: COMMERCIAL

## 2020-09-16 DIAGNOSIS — R22.1 LOCALIZED SWELLING, MASS AND LUMP, NECK: ICD-10-CM

## 2020-09-16 PROCEDURE — 76536 US SOFT TISSUE HEAD NECK THYROID: ICD-10-PCS | Mod: 26,,, | Performed by: RADIOLOGY

## 2020-09-16 PROCEDURE — 76536 US EXAM OF HEAD AND NECK: CPT | Mod: 26,,, | Performed by: RADIOLOGY

## 2020-09-16 PROCEDURE — 76536 US EXAM OF HEAD AND NECK: CPT | Mod: TC,PO

## 2020-09-21 ENCOUNTER — OFFICE VISIT (OUTPATIENT)
Dept: HEMATOLOGY/ONCOLOGY | Facility: CLINIC | Age: 50
End: 2020-09-21
Payer: COMMERCIAL

## 2020-09-21 VITALS
TEMPERATURE: 98 F | SYSTOLIC BLOOD PRESSURE: 139 MMHG | OXYGEN SATURATION: 96 % | DIASTOLIC BLOOD PRESSURE: 83 MMHG | HEART RATE: 91 BPM | BODY MASS INDEX: 23.73 KG/M2 | WEIGHT: 138.25 LBS

## 2020-09-21 DIAGNOSIS — R71.8 ANISOCYTOSIS: ICD-10-CM

## 2020-09-21 DIAGNOSIS — D72.820 ATYPICAL LYMPHOCYTOSIS: ICD-10-CM

## 2020-09-21 PROCEDURE — 99204 PR OFFICE/OUTPT VISIT, NEW, LEVL IV, 45-59 MIN: ICD-10-PCS | Mod: S$GLB,,, | Performed by: INTERNAL MEDICINE

## 2020-09-21 PROCEDURE — 99999 PR PBB SHADOW E&M-EST. PATIENT-LVL IV: ICD-10-PCS | Mod: PBBFAC,,, | Performed by: INTERNAL MEDICINE

## 2020-09-21 PROCEDURE — 99204 OFFICE O/P NEW MOD 45 MIN: CPT | Mod: S$GLB,,, | Performed by: INTERNAL MEDICINE

## 2020-09-21 PROCEDURE — 99999 PR PBB SHADOW E&M-EST. PATIENT-LVL IV: CPT | Mod: PBBFAC,,, | Performed by: INTERNAL MEDICINE

## 2020-09-21 NOTE — PROGRESS NOTES
HPI      50 years old female referred to Hematology Clinic for evaluation of leukocytosis.  Patient has history of scleroderma hypertension to fibromyalgia diabetes type 2 controlled .    Patient denies any weight loss.  Today patient denies any appetite change.  Patient denies any fever chills.  Patient denies any night sweats.        Past Medical History:   Diagnosis Date    Diabetes mellitus, type 2     Fibromyalgia     Hyperlipidemia     Hypertension     Scleroderma      Social History     Socioeconomic History    Marital status:      Spouse name: Not on file    Number of children: Not on file    Years of education: Not on file    Highest education level: Not on file   Occupational History    Not on file   Social Needs    Financial resource strain: Not very hard    Food insecurity     Worry: Never true     Inability: Never true    Transportation needs     Medical: No     Non-medical: No   Tobacco Use    Smoking status: Current Every Day Smoker    Smokeless tobacco: Never Used   Substance and Sexual Activity    Alcohol use: Yes     Frequency: Never     Drinks per session: Patient refused     Binge frequency: Never     Comment: socially    Drug use: No    Sexual activity: Not on file   Lifestyle    Physical activity     Days per week: 4 days     Minutes per session: 40 min    Stress: Only a little   Relationships    Social connections     Talks on phone: More than three times a week     Gets together: Once a week     Attends Denominational service: Not on file     Active member of club or organization: No     Attends meetings of clubs or organizations: Never     Relationship status:    Other Topics Concern    Not on file   Social History Narrative    Not on file         Subjective      Review of Systems   Constitutional: Negative for appetite change, fatigue and unexpected weight change.   HENT: Negative for mouth sores.   Eyes: Negative for visual disturbance.   Respiratory:  Negative for cough and shortness of breath.   Cardiovascular: Negative for chest pain.   Gastrointestinal: Negative for diarrhea.   Genitourinary: Negative for frequency.   Musculoskeletal: Negative for back pain.   Skin: Negative for rash.   Neurological: Negative for headaches.   Hematological: Negative for adenopathy.   Psychiatric/Behavioral: The patient is not nervous/anxious.   All other systems reviewed and are negative.     Objective    Physical Exam   Vitals:    09/21/20 1029   BP: 139/83   Pulse: 91   Temp: 97.6 °F (36.4 °C)           Awake alert no acute distress  Normocephalic atraumatic pupils equal round reactive  Soft nontender nondistended bowel sounds x4  Normal respiratory effort  Normal rate  No evidence labs adenopathy  Move all 4 extremities  Distal pulses intact  No rash was noted   defer  Cranial nerves 2-12 grossly intact sensorimotor grossly intact  CMP  Sodium   Date Value Ref Range Status   09/08/2020 142 136 - 145 mmol/L Final     Potassium   Date Value Ref Range Status   09/08/2020 3.9 3.5 - 5.1 mmol/L Final     Chloride   Date Value Ref Range Status   09/08/2020 107 95 - 110 mmol/L Final     CO2   Date Value Ref Range Status   09/08/2020 24 23 - 29 mmol/L Final     Glucose   Date Value Ref Range Status   09/08/2020 112 (H) 70 - 110 mg/dL Final     BUN, Bld   Date Value Ref Range Status   09/08/2020 17 6 - 20 mg/dL Final     Creatinine   Date Value Ref Range Status   09/08/2020 0.7 0.5 - 1.4 mg/dL Final     Calcium   Date Value Ref Range Status   09/08/2020 9.8 8.7 - 10.5 mg/dL Final     Total Protein   Date Value Ref Range Status   09/08/2020 7.3 6.0 - 8.4 g/dL Final     Albumin   Date Value Ref Range Status   09/08/2020 3.7 3.5 - 5.2 g/dL Final     Total Bilirubin   Date Value Ref Range Status   09/08/2020 0.2 0.1 - 1.0 mg/dL Final     Comment:     For infants and newborns, interpretation of results should be based  on gestational age, weight and in agreement with  clinical  observations.  Premature Infant recommended reference ranges:  Up to 24 hours.............<8.0 mg/dL  Up to 48 hours............<12.0 mg/dL  3-5 days..................<15.0 mg/dL  6-29 days.................<15.0 mg/dL       Alkaline Phosphatase   Date Value Ref Range Status   09/08/2020 74 55 - 135 U/L Final     AST   Date Value Ref Range Status   09/08/2020 17 10 - 40 U/L Final     ALT   Date Value Ref Range Status   09/08/2020 30 10 - 44 U/L Final     Anion Gap   Date Value Ref Range Status   09/08/2020 11 8 - 16 mmol/L Final     eGFR if    Date Value Ref Range Status   09/08/2020 >60.0 >60 mL/min/1.73 m^2 Final     eGFR if non    Date Value Ref Range Status   09/08/2020 >60.0 >60 mL/min/1.73 m^2 Final     Comment:     Calculation used to obtain the estimated glomerular filtration  rate (eGFR) is the CKD-EPI equation.        Lab Results   Component Value Date    WBC 12.93 (H) 09/08/2020    HGB 15.6 09/08/2020    HCT 50.7 (H) 09/08/2020    MCV 99 (H) 09/08/2020     (H) 09/08/2020     Peripheral blood smear  Pathologist interpretation of peripheral blood smear:   White cells show an occasional atypical lymphocyte, favor reactive in   the overall spectrum.     Normochromic normocytic red cells show mild anisocytosis.     Platelets are morphologically unremarkable if mildly increased by   automated count.     If there is no known clinical cause for the patient's mild persistent   leukocytosis and thrombocytosis, consider ruling out a   myeloproliferative neoplasm.     Negative hepatitis C antibodies  Negative HIV      Assessment Plan     Mild leukocytosis favor reactive.  > repeat CBC CMP LDH will repeat peripheral blood smear  > RTC 1 week        Atypical lymphocytosis  -     Ambulatory referral/consult to Hematology / Oncology    Anisocytosis  -     Ambulatory referral/consult to Hematology / Oncology

## 2020-09-21 NOTE — LETTER
September 21, 2020      Theresa Crane MD  [address]  [facility] [address]           [facility] [address] [address]  Phone: [phone]  Fax: [phone]          Patient: Kylee Burgos   MR Number: [id number]   YOB: 1970   Date of Visit: 9/21/2020       Dear Dr. Theresa Crane:    Thank you for referring Kylee Burgos to me for evaluation. Attached you will find relevant portions of my assessment and plan of care.    If you have questions, please do not hesitate to call me. I look forward to following Kylee Burgos along with you.    Sincerely,    Mark Mitchell MD    Enclosure  CC:  No Recipients    If you would like to receive this communication electronically, please contact [email or web address] or [phone] to request more information on [employer] Link access.    For providers and/or their staff who would like to refer a patient to [facility], please contact us through our one-stop-shop provider referral line, [facility], at [phone].    If you feel you have received this communication in error or would no longer like to receive these types of communications, please e-mail [email or web address]

## 2020-10-01 ENCOUNTER — PATIENT OUTREACH (OUTPATIENT)
Dept: ADMINISTRATIVE | Facility: HOSPITAL | Age: 50
End: 2020-10-01

## 2020-10-05 ENCOUNTER — PATIENT MESSAGE (OUTPATIENT)
Dept: ADMINISTRATIVE | Facility: HOSPITAL | Age: 50
End: 2020-10-05

## 2020-11-02 ENCOUNTER — PATIENT OUTREACH (OUTPATIENT)
Dept: ADMINISTRATIVE | Facility: OTHER | Age: 50
End: 2020-11-02

## 2020-11-02 NOTE — PROGRESS NOTES
Health Maintenance Due   Topic Date Due    Eye Exam  07/30/1979    TETANUS VACCINE  07/30/1987    Pneumococcal Vaccine (Medium Risk) (1 of 1 - PPSV23) 07/30/1988    Shingles Vaccine (1 of 2) 07/30/2019    Colorectal Cancer Screening  07/30/2019    Influenza Vaccine (1) 08/01/2020     Updates were requested from care everywhere.  Chart was reviewed for overdue Proactive [facility] Encounters ([facility]) topics (CRS, Breast Cancer Screening, Eye exam)  Health Maintenance has been updated.  LINKS immunization registry triggered.  LINKS not responding.

## 2020-11-06 ENCOUNTER — PATIENT MESSAGE (OUTPATIENT)
Dept: FAMILY MEDICINE | Facility: CLINIC | Age: 50
End: 2020-11-06

## 2020-11-06 DIAGNOSIS — R11.0 NAUSEA: Primary | ICD-10-CM

## 2020-11-06 RX ORDER — ONDANSETRON 8 MG/1
8 TABLET, ORALLY DISINTEGRATING ORAL EVERY 8 HOURS PRN
Qty: 15 TABLET | Refills: 0 | Status: SHIPPED | OUTPATIENT
Start: 2020-11-06 | End: 2021-09-20 | Stop reason: SDUPTHER

## 2020-12-30 ENCOUNTER — PATIENT OUTREACH (OUTPATIENT)
Dept: ADMINISTRATIVE | Facility: HOSPITAL | Age: 50
End: 2020-12-30

## 2021-01-04 ENCOUNTER — PATIENT MESSAGE (OUTPATIENT)
Dept: ADMINISTRATIVE | Facility: HOSPITAL | Age: 51
End: 2021-01-04

## 2021-04-06 ENCOUNTER — PATIENT MESSAGE (OUTPATIENT)
Dept: ADMINISTRATIVE | Facility: HOSPITAL | Age: 51
End: 2021-04-06

## 2021-04-28 ENCOUNTER — PATIENT MESSAGE (OUTPATIENT)
Dept: ENDOCRINOLOGY | Facility: CLINIC | Age: 51
End: 2021-04-28

## 2021-04-29 ENCOUNTER — PATIENT MESSAGE (OUTPATIENT)
Dept: ENDOCRINOLOGY | Facility: CLINIC | Age: 51
End: 2021-04-29

## 2021-04-29 ENCOUNTER — PATIENT MESSAGE (OUTPATIENT)
Dept: FAMILY MEDICINE | Facility: CLINIC | Age: 51
End: 2021-04-29

## 2021-05-05 ENCOUNTER — PATIENT OUTREACH (OUTPATIENT)
Dept: ADMINISTRATIVE | Facility: OTHER | Age: 51
End: 2021-05-05

## 2021-07-07 ENCOUNTER — PATIENT MESSAGE (OUTPATIENT)
Dept: ADMINISTRATIVE | Facility: HOSPITAL | Age: 51
End: 2021-07-07

## 2021-07-15 ENCOUNTER — PATIENT MESSAGE (OUTPATIENT)
Dept: ENDOCRINOLOGY | Facility: CLINIC | Age: 51
End: 2021-07-15

## 2021-08-01 ENCOUNTER — PATIENT MESSAGE (OUTPATIENT)
Dept: ENDOCRINOLOGY | Facility: CLINIC | Age: 51
End: 2021-08-01

## 2021-08-03 ENCOUNTER — LAB VISIT (OUTPATIENT)
Dept: LAB | Facility: HOSPITAL | Age: 51
End: 2021-08-03
Attending: NURSE PRACTITIONER
Payer: COMMERCIAL

## 2021-08-03 DIAGNOSIS — E11.40 TYPE 2 DIABETES MELLITUS WITH DIABETIC NEUROPATHY, WITHOUT LONG-TERM CURRENT USE OF INSULIN: ICD-10-CM

## 2021-08-03 DIAGNOSIS — E55.9 VITAMIN D DEFICIENCY DISEASE: ICD-10-CM

## 2021-08-03 LAB
25(OH)D3+25(OH)D2 SERPL-MCNC: 21 NG/ML (ref 30–96)
ANION GAP SERPL CALC-SCNC: 6 MMOL/L (ref 8–16)
BUN SERPL-MCNC: 14 MG/DL (ref 6–20)
CALCIUM SERPL-MCNC: 9.5 MG/DL (ref 8.7–10.5)
CHLORIDE SERPL-SCNC: 103 MMOL/L (ref 95–110)
CO2 SERPL-SCNC: 29 MMOL/L (ref 23–29)
CREAT SERPL-MCNC: 0.8 MG/DL (ref 0.5–1.4)
EST. GFR  (AFRICAN AMERICAN): >60 ML/MIN/1.73 M^2
EST. GFR  (NON AFRICAN AMERICAN): >60 ML/MIN/1.73 M^2
ESTIMATED AVG GLUCOSE: 126 MG/DL (ref 68–131)
GLUCOSE SERPL-MCNC: 111 MG/DL (ref 70–110)
HBA1C MFR BLD: 6 % (ref 4–5.6)
POTASSIUM SERPL-SCNC: 4 MMOL/L (ref 3.5–5.1)
SODIUM SERPL-SCNC: 138 MMOL/L (ref 136–145)

## 2021-08-03 PROCEDURE — 82306 VITAMIN D 25 HYDROXY: CPT | Performed by: NURSE PRACTITIONER

## 2021-08-03 PROCEDURE — 83036 HEMOGLOBIN GLYCOSYLATED A1C: CPT | Performed by: NURSE PRACTITIONER

## 2021-08-03 PROCEDURE — 36415 COLL VENOUS BLD VENIPUNCTURE: CPT | Mod: PN | Performed by: NURSE PRACTITIONER

## 2021-08-03 PROCEDURE — 80048 BASIC METABOLIC PNL TOTAL CA: CPT | Performed by: NURSE PRACTITIONER

## 2021-08-09 ENCOUNTER — PATIENT OUTREACH (OUTPATIENT)
Dept: ADMINISTRATIVE | Facility: OTHER | Age: 51
End: 2021-08-09

## 2021-08-10 ENCOUNTER — OFFICE VISIT (OUTPATIENT)
Dept: ENDOCRINOLOGY | Facility: CLINIC | Age: 51
End: 2021-08-10
Payer: COMMERCIAL

## 2021-08-10 VITALS
DIASTOLIC BLOOD PRESSURE: 70 MMHG | HEIGHT: 64 IN | SYSTOLIC BLOOD PRESSURE: 130 MMHG | BODY MASS INDEX: 22.94 KG/M2 | WEIGHT: 134.38 LBS | HEART RATE: 102 BPM

## 2021-08-10 DIAGNOSIS — E11.40 TYPE 2 DIABETES MELLITUS WITH DIABETIC NEUROPATHY, WITHOUT LONG-TERM CURRENT USE OF INSULIN: Primary | ICD-10-CM

## 2021-08-10 DIAGNOSIS — E55.9 VITAMIN D DEFICIENCY DISEASE: ICD-10-CM

## 2021-08-10 DIAGNOSIS — I10 ESSENTIAL HYPERTENSION: ICD-10-CM

## 2021-08-10 DIAGNOSIS — E78.2 MIXED HYPERLIPIDEMIA: ICD-10-CM

## 2021-08-10 DIAGNOSIS — R19.7 DIARRHEA, UNSPECIFIED TYPE: ICD-10-CM

## 2021-08-10 PROCEDURE — 99214 PR OFFICE/OUTPT VISIT, EST, LEVL IV, 30-39 MIN: ICD-10-PCS | Mod: S$GLB,,, | Performed by: NURSE PRACTITIONER

## 2021-08-10 PROCEDURE — 99999 PR PBB SHADOW E&M-EST. PATIENT-LVL IV: CPT | Mod: PBBFAC,,, | Performed by: NURSE PRACTITIONER

## 2021-08-10 PROCEDURE — 99999 PR PBB SHADOW E&M-EST. PATIENT-LVL IV: ICD-10-PCS | Mod: PBBFAC,,, | Performed by: NURSE PRACTITIONER

## 2021-08-10 PROCEDURE — 99214 OFFICE O/P EST MOD 30 MIN: CPT | Mod: S$GLB,,, | Performed by: NURSE PRACTITIONER

## 2021-09-15 DIAGNOSIS — Z12.31 OTHER SCREENING MAMMOGRAM: ICD-10-CM

## 2021-09-16 ENCOUNTER — PATIENT MESSAGE (OUTPATIENT)
Dept: ENDOCRINOLOGY | Facility: CLINIC | Age: 51
End: 2021-09-16

## 2021-09-16 DIAGNOSIS — R11.0 NAUSEA: ICD-10-CM

## 2021-09-20 RX ORDER — ONDANSETRON 8 MG/1
8 TABLET, ORALLY DISINTEGRATING ORAL EVERY 8 HOURS PRN
Qty: 15 TABLET | Refills: 1 | Status: SHIPPED | OUTPATIENT
Start: 2021-09-20 | End: 2021-12-02 | Stop reason: SDUPTHER

## 2021-09-22 ENCOUNTER — PATIENT MESSAGE (OUTPATIENT)
Dept: ENDOCRINOLOGY | Facility: CLINIC | Age: 51
End: 2021-09-22

## 2021-09-23 RX ORDER — LISINOPRIL 20 MG/1
20 TABLET ORAL DAILY
Qty: 90 TABLET | Refills: 3 | Status: SHIPPED | OUTPATIENT
Start: 2021-09-23 | End: 2021-09-27 | Stop reason: SDUPTHER

## 2021-09-27 RX ORDER — LISINOPRIL 20 MG/1
20 TABLET ORAL DAILY
Qty: 90 TABLET | Refills: 3 | Status: SHIPPED | OUTPATIENT
Start: 2021-09-27 | End: 2021-10-04

## 2021-10-13 ENCOUNTER — PATIENT MESSAGE (OUTPATIENT)
Dept: ENDOCRINOLOGY | Facility: CLINIC | Age: 51
End: 2021-10-13

## 2021-12-02 ENCOUNTER — PATIENT MESSAGE (OUTPATIENT)
Dept: ENDOCRINOLOGY | Facility: CLINIC | Age: 51
End: 2021-12-02
Payer: COMMERCIAL

## 2021-12-02 DIAGNOSIS — R11.0 NAUSEA: ICD-10-CM

## 2021-12-02 RX ORDER — ONDANSETRON 8 MG/1
8 TABLET, ORALLY DISINTEGRATING ORAL EVERY 8 HOURS PRN
Qty: 15 TABLET | Refills: 1 | Status: SHIPPED | OUTPATIENT
Start: 2021-12-02 | End: 2022-05-10 | Stop reason: SDUPTHER

## 2022-01-05 ENCOUNTER — PATIENT MESSAGE (OUTPATIENT)
Dept: ENDOCRINOLOGY | Facility: CLINIC | Age: 52
End: 2022-01-05

## 2022-01-06 ENCOUNTER — PATIENT MESSAGE (OUTPATIENT)
Dept: ENDOCRINOLOGY | Facility: CLINIC | Age: 52
End: 2022-01-06

## 2022-01-06 RX ORDER — ERTUGLIFLOZIN 15 MG/1
1 TABLET, FILM COATED ORAL DAILY
Qty: 30 TABLET | Refills: 6 | Status: SHIPPED | OUTPATIENT
Start: 2022-01-06 | End: 2022-07-21

## 2022-01-11 ENCOUNTER — PATIENT MESSAGE (OUTPATIENT)
Dept: ENDOCRINOLOGY | Facility: CLINIC | Age: 52
End: 2022-01-11

## 2022-01-31 ENCOUNTER — OFFICE VISIT (OUTPATIENT)
Dept: URGENT CARE | Facility: CLINIC | Age: 52
End: 2022-01-31
Payer: COMMERCIAL

## 2022-01-31 ENCOUNTER — PATIENT MESSAGE (OUTPATIENT)
Dept: ENDOCRINOLOGY | Facility: CLINIC | Age: 52
End: 2022-01-31

## 2022-01-31 VITALS
HEART RATE: 96 BPM | SYSTOLIC BLOOD PRESSURE: 137 MMHG | TEMPERATURE: 99 F | BODY MASS INDEX: 22.88 KG/M2 | WEIGHT: 134 LBS | DIASTOLIC BLOOD PRESSURE: 85 MMHG | HEIGHT: 64 IN | OXYGEN SATURATION: 96 % | RESPIRATION RATE: 16 BRPM

## 2022-01-31 DIAGNOSIS — U07.1 COVID-19: ICD-10-CM

## 2022-01-31 DIAGNOSIS — U07.1 COVID-19 VIRUS DETECTED: ICD-10-CM

## 2022-01-31 DIAGNOSIS — R50.9 FEVER, UNSPECIFIED FEVER CAUSE: Primary | ICD-10-CM

## 2022-01-31 LAB
CTP QC/QA: YES
SARS-COV-2 RDRP RESP QL NAA+PROBE: POSITIVE

## 2022-01-31 PROCEDURE — 3075F SYST BP GE 130 - 139MM HG: CPT | Mod: CPTII,S$GLB,, | Performed by: PHYSICIAN ASSISTANT

## 2022-01-31 PROCEDURE — U0002: ICD-10-PCS | Mod: QW,S$GLB,, | Performed by: PHYSICIAN ASSISTANT

## 2022-01-31 PROCEDURE — 3008F PR BODY MASS INDEX (BMI) DOCUMENTED: ICD-10-PCS | Mod: CPTII,S$GLB,, | Performed by: PHYSICIAN ASSISTANT

## 2022-01-31 PROCEDURE — 1160F RVW MEDS BY RX/DR IN RCRD: CPT | Mod: CPTII,S$GLB,, | Performed by: PHYSICIAN ASSISTANT

## 2022-01-31 PROCEDURE — 3079F PR MOST RECENT DIASTOLIC BLOOD PRESSURE 80-89 MM HG: ICD-10-PCS | Mod: CPTII,S$GLB,, | Performed by: PHYSICIAN ASSISTANT

## 2022-01-31 PROCEDURE — 1160F PR REVIEW ALL MEDS BY PRESCRIBER/CLIN PHARMACIST DOCUMENTED: ICD-10-PCS | Mod: CPTII,S$GLB,, | Performed by: PHYSICIAN ASSISTANT

## 2022-01-31 PROCEDURE — 3079F DIAST BP 80-89 MM HG: CPT | Mod: CPTII,S$GLB,, | Performed by: PHYSICIAN ASSISTANT

## 2022-01-31 PROCEDURE — 3075F PR MOST RECENT SYSTOLIC BLOOD PRESS GE 130-139MM HG: ICD-10-PCS | Mod: CPTII,S$GLB,, | Performed by: PHYSICIAN ASSISTANT

## 2022-01-31 PROCEDURE — 99213 PR OFFICE/OUTPT VISIT, EST, LEVL III, 20-29 MIN: ICD-10-PCS | Mod: S$GLB,,, | Performed by: PHYSICIAN ASSISTANT

## 2022-01-31 PROCEDURE — 1159F MED LIST DOCD IN RCRD: CPT | Mod: CPTII,S$GLB,, | Performed by: PHYSICIAN ASSISTANT

## 2022-01-31 PROCEDURE — 1159F PR MEDICATION LIST DOCUMENTED IN MEDICAL RECORD: ICD-10-PCS | Mod: CPTII,S$GLB,, | Performed by: PHYSICIAN ASSISTANT

## 2022-01-31 PROCEDURE — U0002 COVID-19 LAB TEST NON-CDC: HCPCS | Mod: QW,S$GLB,, | Performed by: PHYSICIAN ASSISTANT

## 2022-01-31 PROCEDURE — 3008F BODY MASS INDEX DOCD: CPT | Mod: CPTII,S$GLB,, | Performed by: PHYSICIAN ASSISTANT

## 2022-01-31 PROCEDURE — 99213 OFFICE O/P EST LOW 20 MIN: CPT | Mod: S$GLB,,, | Performed by: PHYSICIAN ASSISTANT

## 2022-01-31 NOTE — PROGRESS NOTES
"Subjective:       Patient ID: Kylee Burgos is a 51 y.o. female.    Vitals:  height is 5' 4" (1.626 m) and weight is 60.8 kg (134 lb). Her temperature is 99.3 °F (37.4 °C). Her blood pressure is 137/85 and her pulse is 96. Her respiration is 16 and oxygen saturation is 96%.     Chief Complaint: Fever    Fever, congestion, body aches, cough, headaches- 2-3 days  Tylenol, Ibuprofen- mild relief   Recent COVID exposure, COVID vaccianted     Other  This is a new problem. The current episode started in the past 7 days. The problem occurs constantly. The problem has been gradually worsening. Associated symptoms include congestion, coughing, a fever, headaches and myalgias. Pertinent negatives include no abdominal pain, chest pain, chills, diaphoresis or fatigue. She has tried acetaminophen for the symptoms. The treatment provided mild relief.       Constitution: Positive for fever. Negative for chills, sweating and fatigue.   HENT: Positive for congestion.    Neck: Negative for neck stiffness.   Cardiovascular: Negative for chest pain.   Respiratory: Positive for cough. Negative for shortness of breath.    Gastrointestinal: Negative for abdominal pain.   Musculoskeletal: Positive for muscle ache.   Neurological: Positive for headaches.       Objective:      Physical Exam   Constitutional: She is oriented to person, place, and time. She appears well-developed and well-nourished.  Non-toxic appearance. She does not appear ill. No distress.   HENT:   Head: Normocephalic and atraumatic.   Ears:   Right Ear: Hearing and external ear normal.   Left Ear: Hearing and external ear normal.   Eyes: Conjunctivae and EOM are normal. Pupils are equal, round, and reactive to light.   Neck: Neck supple. No neck rigidity present.   Cardiovascular: Normal rate and regular rhythm.   Pulmonary/Chest: Effort normal and breath sounds normal. No respiratory distress. She has no decreased breath sounds. She has no wheezes. She has no " rhonchi.   Lungs cta    Comments: Lungs cta    Neurological: She is alert and oriented to person, place, and time.   Skin: Skin is warm, dry and not diaphoretic.   Psychiatric: Her speech is normal and behavior is normal. Mood normal.   Nursing note and vitals reviewed.    Office Visit on 01/31/2022   Component Date Value Ref Range Status    POC Rapid COVID 01/31/2022 Positive* Negative Final     Acceptable 01/31/2022 Yes   Final           Assessment:       1. Fever, unspecified fever cause    2. COVID-19          Plan:       All hx was provided by the pt or available as part of established EMR. The pt past medical hx, family hx, social hx, and current medications were reviewed. Interpretation of diagnostics performed today were discussed. Tx discussed. Quarantine recommendations based on CDC guidelines discussed. Pt may follow up with [facility] for any concern. ER precautions. Pt voiced understanding of all discussed, and agreed.    COVID complication risk score of 2; per [facility] policy for score of < 2, pt does not qualify for MAB treatment    Some portions of the physical exam were omitted to help reduce chance of potential viral transmission.    Fever, unspecified fever cause  -     POCT COVID-19 Rapid Screening    COVID-19      Patient Instructions   · Follow up with your primary care if symptoms do not improve, or you may return here at any time.  · If you were referred to a specialist, please follow up with that specialty.  · If you were prescribed antibiotics, please take them to completion.  · If you were prescribed a narcotic or any medication with sedative effects, do not drive or operate heavy equipment or machinery while taking these medications.  · You must understand that you have received treatment at an Urgent Care facility only, and that you may be released before all of your medical problems are known or treated. Urgent Care facilities are not equipped to handle life threatening  "emergencies. It is recommended that you seek care at an Emergency Department for further evaluation of worsening or concerning symptoms, or possibly life threatening conditions as discussed.                                        If you  smoke, please stop smoking      You have tested POSITIVE for COVID-19 today    Quarantine recommendations based on CDC guidelines:      No symptoms present: You must quarantine for 5 days starting on the day of the positive test.    Symptoms present: You must quarantine for 5 days starting on the day of symptom onset.      AFTER 5 days, if your symptoms have improved and you are fever free, you can return to the community on day 6.   The CDC recommends strict mask use for 5 days following quarantine end.      You cannot test negative to "test out" of quarantine. Per CDC recommendations we do not retest within 90 days of a positive test.      Please refer to CDC website for additional information        Patient Education       COVID-19 Discharge Instructions   About this topic   Coronavirus disease 2019 is also known as COVID-19. It is a viral illness that infects the lungs. It is caused by a virus called SARS-associated coronavirus (SARS-CoV-2).  The signs of COVID-19 most often start a few days after you have been infected. In some people, it takes longer to show signs. Others never show signs of the infection. You may have a cough, fever, shaking chills and it may be hard to breathe. You may be very tired, have muscle aches, a headache or sore throat. Some people have an upset stomach or loose stools. Others lose their sense of smell or taste. You may not have these signs all the time and they may come and go while you are sick.  The virus spreads easily through droplets when you talk, sneeze, or cough. You can pass the virus to others when you are talking close together, singing, hugging, sharing food, or shaking hands. Doctors believe the germs also survive on surfaces like " tables, door handles, and telephones. However, this is not a common way that COVID-19 spreads. Doctors believe you can also spread the infection even if you dont have any symptoms, but they do not know how that happens. This is why getting vaccinated is one of the best ways to keep you healthy and slow the spread of the virus.  Some people have a mild case of COVID-19 and are able to stay at home and away from others until they feel better. Others may need to be in the hospital if they are very sick. Some people with COVID-19 can have some symptoms for weeks or months. People with COVID-19 must isolate themselves. You can start to be around others when your doctor says it is safe to do so.       What care is needed at home?   · Ask your doctor what you need to do when you go home. Make sure you ask questions if you do not understand what the doctor says.  · Drink lots of water, juice, or broth to replace fluids lost from a fever.  · You may use cool mist humidifiers to help ease congestion and coughing.  · Use 2 to 3 pillows to prop yourself up when you lie down to make it easier to breathe and sleep.  · Do not smoke and do not drink beer, wine, and mixed drinks (alcohol).  · To lower the chance of passing the infection to others, get a COVID-19 vaccine after your infection has resolved.  · If you have not been fully vaccinated:  ? Wear a mask over your mouth and nose if you are around others who are not sick. Cloth masks work best if they have more than one layer of fabric.  ? Wash your hands often.  ? Stay home in a separate room, if possible, away from others. Only go out to get medical care.  ? Use a separate bathroom if possible.  ? Do not make food for others.  What follow-up care is needed?   · Your doctor may ask you to make visits to the office to check on your progress. Be sure to keep these visits. Make sure you wear a mask at these visits.  · If you can, tell the staff you have COVID-19 ahead of time so  they can take extra care to stop the disease from spreading.  · It may take a few weeks before your health returns to normal.  What drugs may be needed?   The doctor may order drugs to:  · Help with breathing  · Help with fever  · Help with swelling in your airways and lungs  · Control coughing  · Ease a sore throat  · Help a runny or stuffy nose  Will physical activity be limited?   You may have to limit your physical activity. Talk to your doctor about the right amount of activity for you. If you have been very sick with COVID-19, it can take some time to get your strength back.  Will there be any other care needed?   Doctors do not know how long you can pass the virus on to others after you are sick. This is why it is important to stay in a separate room, if possible, when you are sick. For now, doctors are giving general guidelines for you to follow after you have been sick. Before you go around other people, you should:  · Be fever free for 24 hours without taking any drugs to lower the fever  · Have no symptoms of cough or shortness of breath  · Wait at least 10 days after first having symptoms or your first positive test, and you need to be symptom free as above. Some experts suggest waiting 20 days if you have had a more severe infection.  Talk with your doctor about getting a COVID-19 vaccine.  What problems could happen?   · Fluid loss. This is dehydration.  · Short-term or long-term lung damage  · Heart problems  · Death  When do I need to call the doctor?   · You are having so much trouble breathing that you can only say one or two words at a time.  · You need to sit upright at all times to be able to breathe and/or cannot lie down.  · You are very confused or cannot stay awake.  · Your lips or skin start to turn blue or grey.  · You think you might be having a medical emergency. Some examples of medical emergencies are:  ? Severe chest pain.  ? Not able to speak or move normally.  · You have trouble  breathing when talking or sitting still.  · You have new shortness of breath.  · You become weak or dizzy.  · You have very dark urine or do not pass urine for more than 8 hours.  · You have new or worsening COVID-19 symptoms like:  ? Fever  ? Cough  ? Feeling very tired  ? Shaking chills  ? Headache  ? Trouble swallowing  ? Throwing up  ? Loose stools  ? Reddish purple spots on your fingers or toes  Teach Back: Helping You Understand   The Teach Back Method helps you understand the information we are giving you. After you talk with the staff, tell them in your own words what you learned. This helps to make sure the staff has described each thing clearly. It also helps to explain things that may have been confusing. Before going home, make sure you can do these:  · I can tell you about my condition.  · I can tell you what may help ease my breathing.  · I can tell you what I can do to help avoid passing the infection to others.  · I can tell you what I will do if I have trouble breathing; feel sleepy or confused; or my fingertips, fingernails, skin, or lips are blue.  Where can I learn more?   Centers for Disease Control and Prevention  https://www.cdc.gov/coronavirus/2019-ncov/about/index.html   Centers for Disease Control and Prevention  https://www.cdc.gov/coronavirus/2019-ncov/hcp/disposition-in-home-patients.html   World Health Organization  https://www.who.int/news-room/q-a-detail/a-e-ricaehrdthint   Last Reviewed Date   2021-10-05  Consumer Information Use and Disclaimer   This information is not specific medical advice and does not replace information you receive from your health care provider. This is only a brief summary of general information. It does NOT include all information about conditions, illnesses, injuries, tests, procedures, treatments, therapies, discharge instructions or life-style choices that may apply to you. You must talk with your health care provider for complete information about your  health and treatment options. This information should not be used to decide whether or not to accept your health care providers advice, instructions or recommendations. Only your health care provider has the knowledge and training to provide advice that is right for you.  Copyright   Copyright © 2021 [employer] and its affiliates and/or licensors. All rights reserved.

## 2022-02-10 ENCOUNTER — PATIENT MESSAGE (OUTPATIENT)
Dept: ENDOCRINOLOGY | Facility: CLINIC | Age: 52
End: 2022-02-10

## 2022-02-10 ENCOUNTER — TELEPHONE (OUTPATIENT)
Dept: ENDOCRINOLOGY | Facility: CLINIC | Age: 52
End: 2022-02-10

## 2022-02-21 ENCOUNTER — PATIENT MESSAGE (OUTPATIENT)
Dept: ENDOCRINOLOGY | Facility: CLINIC | Age: 52
End: 2022-02-21

## 2022-02-21 DIAGNOSIS — N30.00 ACUTE CYSTITIS WITHOUT HEMATURIA: Primary | ICD-10-CM

## 2022-02-23 RX ORDER — SULFAMETHOXAZOLE AND TRIMETHOPRIM 800; 160 MG/1; MG/1
1 TABLET ORAL 2 TIMES DAILY
Qty: 10 TABLET | Refills: 0 | Status: SHIPPED | OUTPATIENT
Start: 2022-02-23 | End: 2023-01-11

## 2022-02-25 ENCOUNTER — PATIENT MESSAGE (OUTPATIENT)
Dept: ENDOCRINOLOGY | Facility: CLINIC | Age: 52
End: 2022-02-25

## 2022-02-28 ENCOUNTER — PATIENT MESSAGE (OUTPATIENT)
Dept: ADMINISTRATIVE | Facility: HOSPITAL | Age: 52
End: 2022-02-28

## 2022-03-03 ENCOUNTER — PATIENT MESSAGE (OUTPATIENT)
Dept: ENDOCRINOLOGY | Facility: CLINIC | Age: 52
End: 2022-03-03

## 2022-03-15 ENCOUNTER — LAB VISIT (OUTPATIENT)
Dept: LAB | Facility: HOSPITAL | Age: 52
End: 2022-03-15
Attending: NURSE PRACTITIONER
Payer: COMMERCIAL

## 2022-03-15 DIAGNOSIS — E11.40 TYPE 2 DIABETES MELLITUS WITH DIABETIC NEUROPATHY, WITHOUT LONG-TERM CURRENT USE OF INSULIN: ICD-10-CM

## 2022-03-15 DIAGNOSIS — E55.9 VITAMIN D DEFICIENCY DISEASE: ICD-10-CM

## 2022-03-15 LAB
ALBUMIN SERPL BCP-MCNC: 3.5 G/DL (ref 3.5–5.2)
ALP SERPL-CCNC: 79 U/L (ref 55–135)
ALT SERPL W/O P-5'-P-CCNC: 25 U/L (ref 10–44)
ANION GAP SERPL CALC-SCNC: 11 MMOL/L (ref 8–16)
AST SERPL-CCNC: 13 U/L (ref 10–40)
BILIRUB SERPL-MCNC: 0.3 MG/DL (ref 0.1–1)
BUN SERPL-MCNC: 14 MG/DL (ref 6–20)
CALCIUM SERPL-MCNC: 9.3 MG/DL (ref 8.7–10.5)
CHLORIDE SERPL-SCNC: 105 MMOL/L (ref 95–110)
CHOLEST SERPL-MCNC: 168 MG/DL (ref 120–199)
CHOLEST/HDLC SERPL: 2.9 {RATIO} (ref 2–5)
CO2 SERPL-SCNC: 27 MMOL/L (ref 23–29)
CREAT SERPL-MCNC: 0.7 MG/DL (ref 0.5–1.4)
EST. GFR  (AFRICAN AMERICAN): >60 ML/MIN/1.73 M^2
EST. GFR  (NON AFRICAN AMERICAN): >60 ML/MIN/1.73 M^2
ESTIMATED AVG GLUCOSE: 128 MG/DL (ref 68–131)
GLUCOSE SERPL-MCNC: 111 MG/DL (ref 70–110)
HBA1C MFR BLD: 6.1 % (ref 4–5.6)
HDLC SERPL-MCNC: 57 MG/DL (ref 40–75)
HDLC SERPL: 33.9 % (ref 20–50)
LDLC SERPL CALC-MCNC: 78.6 MG/DL (ref 63–159)
NONHDLC SERPL-MCNC: 111 MG/DL
POTASSIUM SERPL-SCNC: 4.1 MMOL/L (ref 3.5–5.1)
PROT SERPL-MCNC: 6.8 G/DL (ref 6–8.4)
SODIUM SERPL-SCNC: 143 MMOL/L (ref 136–145)
TRIGL SERPL-MCNC: 162 MG/DL (ref 30–150)

## 2022-03-15 PROCEDURE — 83036 HEMOGLOBIN GLYCOSYLATED A1C: CPT | Performed by: NURSE PRACTITIONER

## 2022-03-15 PROCEDURE — 80053 COMPREHEN METABOLIC PANEL: CPT | Performed by: NURSE PRACTITIONER

## 2022-03-15 PROCEDURE — 36415 COLL VENOUS BLD VENIPUNCTURE: CPT | Mod: PN | Performed by: NURSE PRACTITIONER

## 2022-03-15 PROCEDURE — 80061 LIPID PANEL: CPT | Performed by: NURSE PRACTITIONER

## 2022-03-16 ENCOUNTER — PATIENT MESSAGE (OUTPATIENT)
Dept: ENDOCRINOLOGY | Facility: CLINIC | Age: 52
End: 2022-03-16

## 2022-03-17 ENCOUNTER — OFFICE VISIT (OUTPATIENT)
Dept: ENDOCRINOLOGY | Facility: CLINIC | Age: 52
End: 2022-03-17
Payer: COMMERCIAL

## 2022-03-17 ENCOUNTER — TELEPHONE (OUTPATIENT)
Dept: ENDOCRINOLOGY | Facility: CLINIC | Age: 52
End: 2022-03-17

## 2022-03-17 DIAGNOSIS — E55.9 VITAMIN D DEFICIENCY DISEASE: ICD-10-CM

## 2022-03-17 DIAGNOSIS — E78.2 MIXED HYPERLIPIDEMIA: ICD-10-CM

## 2022-03-17 DIAGNOSIS — I10 ESSENTIAL HYPERTENSION: ICD-10-CM

## 2022-03-17 DIAGNOSIS — E11.40 TYPE 2 DIABETES MELLITUS WITH DIABETIC NEUROPATHY, WITHOUT LONG-TERM CURRENT USE OF INSULIN: Primary | ICD-10-CM

## 2022-03-17 PROCEDURE — 1159F PR MEDICATION LIST DOCUMENTED IN MEDICAL RECORD: ICD-10-PCS | Mod: CPTII,95,, | Performed by: NURSE PRACTITIONER

## 2022-03-17 PROCEDURE — 3060F PR POS MICROALBUMINURIA RESULT DOCUMENTED/REVIEW: ICD-10-PCS | Mod: CPTII,95,, | Performed by: NURSE PRACTITIONER

## 2022-03-17 PROCEDURE — 1160F PR REVIEW ALL MEDS BY PRESCRIBER/CLIN PHARMACIST DOCUMENTED: ICD-10-PCS | Mod: CPTII,95,, | Performed by: NURSE PRACTITIONER

## 2022-03-17 PROCEDURE — 3066F PR DOCUMENTATION OF TREATMENT FOR NEPHROPATHY: ICD-10-PCS | Mod: CPTII,95,, | Performed by: NURSE PRACTITIONER

## 2022-03-17 PROCEDURE — 99214 OFFICE O/P EST MOD 30 MIN: CPT | Mod: 95,,, | Performed by: NURSE PRACTITIONER

## 2022-03-17 PROCEDURE — 1159F MED LIST DOCD IN RCRD: CPT | Mod: CPTII,95,, | Performed by: NURSE PRACTITIONER

## 2022-03-17 PROCEDURE — 3044F HG A1C LEVEL LT 7.0%: CPT | Mod: CPTII,95,, | Performed by: NURSE PRACTITIONER

## 2022-03-17 PROCEDURE — 99214 PR OFFICE/OUTPT VISIT, EST, LEVL IV, 30-39 MIN: ICD-10-PCS | Mod: 95,,, | Performed by: NURSE PRACTITIONER

## 2022-03-17 PROCEDURE — 3060F POS MICROALBUMINURIA REV: CPT | Mod: CPTII,95,, | Performed by: NURSE PRACTITIONER

## 2022-03-17 PROCEDURE — 3044F PR MOST RECENT HEMOGLOBIN A1C LEVEL <7.0%: ICD-10-PCS | Mod: CPTII,95,, | Performed by: NURSE PRACTITIONER

## 2022-03-17 PROCEDURE — 1160F RVW MEDS BY RX/DR IN RCRD: CPT | Mod: CPTII,95,, | Performed by: NURSE PRACTITIONER

## 2022-03-17 PROCEDURE — 3066F NEPHROPATHY DOC TX: CPT | Mod: CPTII,95,, | Performed by: NURSE PRACTITIONER

## 2022-03-17 RX ORDER — GLIMEPIRIDE 1 MG/1
1 TABLET ORAL
Qty: 30 TABLET | Refills: 3 | Status: SHIPPED | OUTPATIENT
Start: 2022-03-17 | End: 2022-06-13

## 2022-03-17 NOTE — PROGRESS NOTES
The patient location is:  Patient Home   The chief complaint leading to consultation is: Type 2 DM  Visit type: Virtual visit with synchronous audio and video  Total time spent with patient: 15 min  Each patient to whom he or she provides medical services by telemedicine is:  (1) informed of the relationship between the physician and patient and the respective role of any other health care provider with respect to management of the patient; and (2) notified that he or she may decline to receive medical services by telemedicine and may withdraw from such care at any time.       CC: Ms. Kylee Burgos arrives today for management of Type 2 DM and review of chronic medical conditions, as listed in the Visit Diagnosis section of this encounter.       HPI: Ms. Kylee Burgos was diagnosed with Type 2 DM in 8/2017, after having recurrent yeast infections. A1c > 14% upon workup and she went to ER at time of diagnosis for glucose >600. C-peptide and DESMOND ab were normal. Initial treatment consisted of insulin and metformin. Jardiance and Trulicity were added soon after. She lost 25 lbs in 2017 and was able to discontinue insulin. Trulicity was later added but discontinued in 2/2019, due to A1c of 5.6% on triple therapy. + FH of DM in maternal GF. Denies hospitalizations due to DM.     Last seen by me in August.     She had 3 UTIs in the past 3 months. She has since stopped the Steglatro. She had changed from Jardiance to Steglatro because of cost (high deductible plan).     She will be starting new job next week. New insurance will kick in at end of April and deductible is only $250.     BG readings are checked once daily, alternating times. Reads off blood sugars and 120-190s, including both fasting and non-fasting.    Hypoglycemia: No    Missing Insulin/PO medication doses: No    Exercise: No.     Dietary Habits: Eats 2 meals/day. Usually skips breakfast. May snack after dinner on SF pudding or piece of dark  chocolate. Avoids sugary beverages.       CURRENT DIABETIC MEDS: metformin XR 1000 mg BID  Glucometer type: One Touch Verio    Previous DM treatments:  Januvia - not effective  Metformin 1000 mg - diarrhea  Farxiga - elevated glucoses when compared to Jardiance   Trulicity  Jardiance - cost  Steglatro - recurrent UTI    Last Eye Exam: 2017. She has been advised to schedule this   Last Podiatry Exam: 2018 for ingrown toe nails    REVIEW OF SYSTEMS  Constitutional: no c/o fatigue, weakness, weight loss   Eyes: denies visual disturbances  Cardiac: no palpitations or chest pain.  Respiratory: no cough or dyspnea.  GI: no c/o nausea, abdominal pain. Denies h/o pancreatitis.   Skin: no lesions or rashes.  Neuro: denies numbness, tingling, paresthesias  Endocrine: denies polyphagia, polydipsia, polyuria.      Personally reviewed Past Medical, Surgical, Social History.    Vital Signs  There were no vitals taken for this visit. -- video visit    Personally reviewed the below labs:    Hemoglobin A1C   Date Value Ref Range Status   03/15/2022 6.1 (H) 4.0 - 5.6 % Final     Comment:     ADA Screening Guidelines:  5.7-6.4%  Consistent with prediabetes  >or=6.5%  Consistent with diabetes    High levels of fetal hemoglobin interfere with the HbA1C  assay. Heterozygous hemoglobin variants (HbS, HgC, etc)do  not significantly interfere with this assay.   However, presence of multiple variants may affect accuracy.     08/03/2021 6.0 (H) 4.0 - 5.6 % Final     Comment:     ADA Screening Guidelines:  5.7-6.4%  Consistent with prediabetes  >or=6.5%  Consistent with diabetes    High levels of fetal hemoglobin interfere with the HbA1C  assay. Heterozygous hemoglobin variants (HbS, HgC, etc)do  not significantly interfere with this assay.   However, presence of multiple variants may affect accuracy.     09/08/2020 6.0 (H) 4.0 - 5.6 % Final     Comment:     ADA Screening Guidelines:  5.7-6.4%  Consistent with prediabetes  >or=6.5%  Consistent  with diabetes  High levels of fetal hemoglobin interfere with the HbA1C  assay. Heterozygous hemoglobin variants (HbS, HgC, etc)do  not significantly interfere with this assay.   However, presence of multiple variants may affect accuracy.         Chemistry        Component Value Date/Time     03/15/2022 0748    K 4.1 03/15/2022 0748     03/15/2022 0748    CO2 27 03/15/2022 0748    BUN 14 03/15/2022 0748    CREATININE 0.7 03/15/2022 0748     (H) 03/15/2022 0748        Component Value Date/Time    CALCIUM 9.3 03/15/2022 0748    ALKPHOS 79 03/15/2022 0748    AST 13 03/15/2022 0748    ALT 25 03/15/2022 0748    BILITOT 0.3 03/15/2022 0748    ESTGFRAFRICA >60.0 03/15/2022 0748    EGFRNONAA >60.0 03/15/2022 0748          Lab Results   Component Value Date    CHOL 168 03/15/2022    CHOL 186 07/07/2020    CHOL 164 07/19/2019     Lab Results   Component Value Date    HDL 57 03/15/2022    HDL 61 07/07/2020    HDL 59 07/19/2019     Lab Results   Component Value Date    LDLCALC 78.6 03/15/2022    LDLCALC 96.8 07/07/2020    LDLCALC 84.6 07/19/2019     Lab Results   Component Value Date    TRIG 162 (H) 03/15/2022    TRIG 141 07/07/2020    TRIG 102 07/19/2019     Lab Results   Component Value Date    CHOLHDL 33.9 03/15/2022    CHOLHDL 32.8 07/07/2020    CHOLHDL 36.0 07/19/2019       Lab Results   Component Value Date    MICALBCREAT 39.9 (H) 03/15/2022     Lab Results   Component Value Date    TSH 1.249 09/08/2020       CrCl cannot be calculated (Unknown ideal weight.).    Vit D, 25-Hydroxy   Date Value Ref Range Status   08/03/2021 21 (L) 30 - 96 ng/mL Final     Comment:     Vitamin D deficiency.........<10 ng/mL                              Vitamin D insufficiency......10-29 ng/mL       Vitamin D sufficiency........> or equal to 30 ng/mL  Vitamin D toxicity............>100 ng/mL         PHYSICAL EXAMINATION  Deferred - virtual visit         A1c target < 7%      Assessment/Plan  1. Type 2 diabetes mellitus with  neuropathy, without long term insulin use status  -- A1c at goal. Now having some higher readings since stopping Steglatro. Will proceed with generic option and then replace with Trulicity once new insurance goes into effect.   -- continue metformin XR   -- will use glimepiride 1 mg daily before first meal. Discussed medication's mechanism of action, side effects, and contraindications.   -- once new insurance kicks in, will plan to start Trulicity 0.75 mg weekly and stop glimepiride. She will let me know when to send this in.  -- needs to schedule eye exam.   -- check BG 1x/day, alternating times    -- Discussed diagnosis of DM, A1c goals, progression of disease, long term complications and tx options.    2. Essential hypertension  -- previously controlled  -- continue lisinopril   3. Mixed hyperlipidemia  -- controlled.   -- continue pravastatin (couldn't tolerate atorvastatin)  Lab Results   Component Value Date    LDLCALC 78.6 03/15/2022      4. Vitamin D deficiency -- previously uncontrolled.  -- she continues on maintenance ergocalciferol 50,000 IU twice weekly  -- unable to check level due to retracted Vit D lab order secondary to lab tube shortage.          FOLLOW UP  Follow up in about 4 months (around 7/17/2022).   Patient instructed to bring BG logs to each follow up   Patient encouraged to call for any BG/medication issues, concerns, or questions.    Orders Placed This Encounter   Procedures    Hemoglobin A1C

## 2022-03-18 ENCOUNTER — PATIENT MESSAGE (OUTPATIENT)
Dept: ENDOCRINOLOGY | Facility: CLINIC | Age: 52
End: 2022-03-18

## 2022-04-26 ENCOUNTER — PATIENT MESSAGE (OUTPATIENT)
Dept: ENDOCRINOLOGY | Facility: CLINIC | Age: 52
End: 2022-04-26

## 2022-04-27 RX ORDER — DULAGLUTIDE 0.75 MG/.5ML
0.75 INJECTION, SOLUTION SUBCUTANEOUS
Qty: 4 PEN | Refills: 6 | Status: SHIPPED | OUTPATIENT
Start: 2022-04-27 | End: 2023-01-11 | Stop reason: SDUPTHER

## 2022-05-01 ENCOUNTER — PATIENT MESSAGE (OUTPATIENT)
Dept: ENDOCRINOLOGY | Facility: CLINIC | Age: 52
End: 2022-05-01

## 2022-05-02 ENCOUNTER — PATIENT MESSAGE (OUTPATIENT)
Dept: ENDOCRINOLOGY | Facility: CLINIC | Age: 52
End: 2022-05-02

## 2022-05-02 ENCOUNTER — TELEPHONE (OUTPATIENT)
Dept: ENDOCRINOLOGY | Facility: CLINIC | Age: 52
End: 2022-05-02

## 2022-05-02 NOTE — TELEPHONE ENCOUNTER
Trulicity PA started   Key:Key: [id number]    This request cannot be completed over ePA. To complete the PA, please call the plan directly at [phone]    When called number they said pt was not active since may 11 2022 w/martina(John). Pt notified to call her insurance to find out who Rx company)is she active with

## 2022-05-10 ENCOUNTER — PATIENT MESSAGE (OUTPATIENT)
Dept: ENDOCRINOLOGY | Facility: CLINIC | Age: 52
End: 2022-05-10

## 2022-05-10 DIAGNOSIS — R11.0 NAUSEA: ICD-10-CM

## 2022-05-10 RX ORDER — ONDANSETRON 8 MG/1
8 TABLET, ORALLY DISINTEGRATING ORAL EVERY 8 HOURS PRN
Qty: 20 TABLET | Refills: 1 | Status: SHIPPED | OUTPATIENT
Start: 2022-05-10 | End: 2023-07-27 | Stop reason: SDUPTHER

## 2022-05-31 ENCOUNTER — PATIENT MESSAGE (OUTPATIENT)
Dept: ADMINISTRATIVE | Facility: HOSPITAL | Age: 52
End: 2022-05-31

## 2022-06-25 ENCOUNTER — PATIENT MESSAGE (OUTPATIENT)
Dept: ENDOCRINOLOGY | Facility: CLINIC | Age: 52
End: 2022-06-25
Payer: COMMERCIAL

## 2022-06-29 ENCOUNTER — PATIENT MESSAGE (OUTPATIENT)
Dept: ENDOCRINOLOGY | Facility: CLINIC | Age: 52
End: 2022-06-29
Payer: COMMERCIAL

## 2022-07-05 ENCOUNTER — PATIENT MESSAGE (OUTPATIENT)
Dept: ENDOCRINOLOGY | Facility: CLINIC | Age: 52
End: 2022-07-05
Payer: COMMERCIAL

## 2022-07-14 ENCOUNTER — LAB VISIT (OUTPATIENT)
Dept: LAB | Facility: HOSPITAL | Age: 52
End: 2022-07-14
Attending: NURSE PRACTITIONER
Payer: COMMERCIAL

## 2022-07-14 DIAGNOSIS — E11.40 TYPE 2 DIABETES MELLITUS WITH DIABETIC NEUROPATHY, WITHOUT LONG-TERM CURRENT USE OF INSULIN: ICD-10-CM

## 2022-07-14 LAB
ESTIMATED AVG GLUCOSE: 117 MG/DL (ref 68–131)
HBA1C MFR BLD: 5.7 % (ref 4–5.6)

## 2022-07-14 PROCEDURE — 36415 COLL VENOUS BLD VENIPUNCTURE: CPT | Mod: PN | Performed by: NURSE PRACTITIONER

## 2022-07-14 PROCEDURE — 83036 HEMOGLOBIN GLYCOSYLATED A1C: CPT | Performed by: NURSE PRACTITIONER

## 2022-07-21 ENCOUNTER — OFFICE VISIT (OUTPATIENT)
Dept: ENDOCRINOLOGY | Facility: CLINIC | Age: 52
End: 2022-07-21
Payer: COMMERCIAL

## 2022-07-21 DIAGNOSIS — E11.40 TYPE 2 DIABETES MELLITUS WITH DIABETIC NEUROPATHY, WITHOUT LONG-TERM CURRENT USE OF INSULIN: Primary | ICD-10-CM

## 2022-07-21 DIAGNOSIS — E55.9 VITAMIN D DEFICIENCY DISEASE: ICD-10-CM

## 2022-07-21 DIAGNOSIS — E78.2 MIXED HYPERLIPIDEMIA: ICD-10-CM

## 2022-07-21 DIAGNOSIS — I10 ESSENTIAL HYPERTENSION: ICD-10-CM

## 2022-07-21 PROCEDURE — 4010F PR ACE/ARB THEARPY RXD/TAKEN: ICD-10-PCS | Mod: CPTII,95,, | Performed by: NURSE PRACTITIONER

## 2022-07-21 PROCEDURE — 1160F PR REVIEW ALL MEDS BY PRESCRIBER/CLIN PHARMACIST DOCUMENTED: ICD-10-PCS | Mod: CPTII,95,, | Performed by: NURSE PRACTITIONER

## 2022-07-21 PROCEDURE — 3044F PR MOST RECENT HEMOGLOBIN A1C LEVEL <7.0%: ICD-10-PCS | Mod: CPTII,95,, | Performed by: NURSE PRACTITIONER

## 2022-07-21 PROCEDURE — 4010F ACE/ARB THERAPY RXD/TAKEN: CPT | Mod: CPTII,95,, | Performed by: NURSE PRACTITIONER

## 2022-07-21 PROCEDURE — 99214 PR OFFICE/OUTPT VISIT, EST, LEVL IV, 30-39 MIN: ICD-10-PCS | Mod: 95,,, | Performed by: NURSE PRACTITIONER

## 2022-07-21 PROCEDURE — 3044F HG A1C LEVEL LT 7.0%: CPT | Mod: CPTII,95,, | Performed by: NURSE PRACTITIONER

## 2022-07-21 PROCEDURE — 3060F POS MICROALBUMINURIA REV: CPT | Mod: CPTII,95,, | Performed by: NURSE PRACTITIONER

## 2022-07-21 PROCEDURE — 1159F MED LIST DOCD IN RCRD: CPT | Mod: CPTII,95,, | Performed by: NURSE PRACTITIONER

## 2022-07-21 PROCEDURE — 3066F NEPHROPATHY DOC TX: CPT | Mod: CPTII,95,, | Performed by: NURSE PRACTITIONER

## 2022-07-21 PROCEDURE — 99214 OFFICE O/P EST MOD 30 MIN: CPT | Mod: 95,,, | Performed by: NURSE PRACTITIONER

## 2022-07-21 PROCEDURE — 3066F PR DOCUMENTATION OF TREATMENT FOR NEPHROPATHY: ICD-10-PCS | Mod: CPTII,95,, | Performed by: NURSE PRACTITIONER

## 2022-07-21 PROCEDURE — 3060F PR POS MICROALBUMINURIA RESULT DOCUMENTED/REVIEW: ICD-10-PCS | Mod: CPTII,95,, | Performed by: NURSE PRACTITIONER

## 2022-07-21 PROCEDURE — 1160F RVW MEDS BY RX/DR IN RCRD: CPT | Mod: CPTII,95,, | Performed by: NURSE PRACTITIONER

## 2022-07-21 PROCEDURE — 1159F PR MEDICATION LIST DOCUMENTED IN MEDICAL RECORD: ICD-10-PCS | Mod: CPTII,95,, | Performed by: NURSE PRACTITIONER

## 2022-07-21 NOTE — PROGRESS NOTES
The patient location is:  Patient Home   The chief complaint leading to consultation is: Type 2 DM  Visit type: Virtual visit with synchronous audio and video  Total time spent with patient: 15 min  Each patient to whom he or she provides medical services by telemedicine is:  (1) informed of the relationship between the physician and patient and the respective role of any other health care provider with respect to management of the patient; and (2) notified that he or she may decline to receive medical services by telemedicine and may withdraw from such care at any time.       CC: Ms. Kylee Burgos arrives today for management of Type 2 DM and review of chronic medical conditions, as listed in the Visit Diagnosis section of this encounter.       HPI: Ms. Kylee Burgos was diagnosed with Type 2 DM in 8/2017, after having recurrent yeast infections. A1c > 14% upon workup and she went to ER at time of diagnosis for glucose >600. C-peptide and DESMOND ab were normal. Initial treatment consisted of insulin and metformin. Jardiance and Trulicity were added soon after. She lost 25 lbs in 2017 and was able to discontinue insulin. Trulicity was later added but discontinued in 2/2019, due to A1c of 5.6% on triple therapy. + FH of DM in maternal GF. Denies hospitalizations due to DM.     Last seen by me in March.    Once new insurance went into effect in April, she was able to resume Trulicity. Had some mild nausea for the first 2-3 weeks but now tolerating well.     BG readings are checked once daily, usually fasting. Recalls readings ranging .     Hypoglycemia: No    Missing Insulin/PO medication doses: No    Exercise: No.     Dietary Habits: Eats 2 meals/day. Usually skips breakfast. Rare snacking since resuming Trulicity. Avoids sugary beverages.     She reports that she has been off of Vitamin D2 for some time.       CURRENT DIABETIC MEDS: metformin XR 1000 mg BID, Trulicity 0.75 mg weekly  Glucometer type:  One Touch Verio    Previous DM treatments:  Januvia - not effective  Metformin 1000 mg - diarrhea  Farxiga - elevated glucoses when compared to Jardiance   Trulicity  Jardiance - cost  Steglatro - recurrent UTI  Glimepiride     Last Eye Exam: 2017. She has been advised to schedule this   Last Podiatry Exam: 2018 for ingrown toe nails     REVIEW OF SYSTEMS  Constitutional: no c/o fatigue, weakness, weight loss   Eyes: denies visual disturbances  Cardiac: no palpitations or chest pain.  Respiratory: no cough or dyspnea.  GI: no c/o nausea, abdominal pain. Denies h/o pancreatitis.   Skin: no lesions or rashes.  Neuro: denies numbness, tingling, paresthesias  Endocrine: denies polyphagia, polydipsia, polyuria.      Personally reviewed Past Medical, Surgical, Social History.    Vital Signs  There were no vitals taken for this visit. -- video visit    Personally reviewed the below labs:    Hemoglobin A1C   Date Value Ref Range Status   07/14/2022 5.7 (H) 4.0 - 5.6 % Final     Comment:     ADA Screening Guidelines:  5.7-6.4%  Consistent with prediabetes  >or=6.5%  Consistent with diabetes    High levels of fetal hemoglobin interfere with the HbA1C  assay. Heterozygous hemoglobin variants (HbS, HgC, etc)do  not significantly interfere with this assay.   However, presence of multiple variants may affect accuracy.     03/15/2022 6.1 (H) 4.0 - 5.6 % Final     Comment:     ADA Screening Guidelines:  5.7-6.4%  Consistent with prediabetes  >or=6.5%  Consistent with diabetes    High levels of fetal hemoglobin interfere with the HbA1C  assay. Heterozygous hemoglobin variants (HbS, HgC, etc)do  not significantly interfere with this assay.   However, presence of multiple variants may affect accuracy.     08/03/2021 6.0 (H) 4.0 - 5.6 % Final     Comment:     ADA Screening Guidelines:  5.7-6.4%  Consistent with prediabetes  >or=6.5%  Consistent with diabetes    High levels of fetal hemoglobin interfere with the HbA1C  assay.  Heterozygous hemoglobin variants (HbS, HgC, etc)do  not significantly interfere with this assay.   However, presence of multiple variants may affect accuracy.         Chemistry        Component Value Date/Time     03/15/2022 0748    K 4.1 03/15/2022 0748     03/15/2022 0748    CO2 27 03/15/2022 0748    BUN 14 03/15/2022 0748    CREATININE 0.7 03/15/2022 0748     (H) 03/15/2022 0748        Component Value Date/Time    CALCIUM 9.3 03/15/2022 0748    ALKPHOS 79 03/15/2022 0748    AST 13 03/15/2022 0748    ALT 25 03/15/2022 0748    BILITOT 0.3 03/15/2022 0748    ESTGFRAFRICA >60.0 03/15/2022 0748    EGFRNONAA >60.0 03/15/2022 0748          Lab Results   Component Value Date    CHOL 168 03/15/2022    CHOL 186 07/07/2020    CHOL 164 07/19/2019     Lab Results   Component Value Date    HDL 57 03/15/2022    HDL 61 07/07/2020    HDL 59 07/19/2019     Lab Results   Component Value Date    LDLCALC 78.6 03/15/2022    LDLCALC 96.8 07/07/2020    LDLCALC 84.6 07/19/2019     Lab Results   Component Value Date    TRIG 162 (H) 03/15/2022    TRIG 141 07/07/2020    TRIG 102 07/19/2019     Lab Results   Component Value Date    CHOLHDL 33.9 03/15/2022    CHOLHDL 32.8 07/07/2020    CHOLHDL 36.0 07/19/2019       Lab Results   Component Value Date    MICALBCREAT 39.9 (H) 03/15/2022     Lab Results   Component Value Date    TSH 1.249 09/08/2020       CrCl cannot be calculated (Patient's most recent lab result is older than the maximum 7 days allowed.).    Vit D, 25-Hydroxy   Date Value Ref Range Status   08/03/2021 21 (L) 30 - 96 ng/mL Final     Comment:     Vitamin D deficiency.........<10 ng/mL                              Vitamin D insufficiency......10-29 ng/mL       Vitamin D sufficiency........> or equal to 30 ng/mL  Vitamin D toxicity............>100 ng/mL         PHYSICAL EXAMINATION  Deferred - virtual visit         A1c target < 7%      Assessment/Plan  1. Type 2 diabetes mellitus with neuropathy, without long  term insulin use status  -- A1c at goal.   -- continue Trulicity, metformin XR  -- needs to schedule eye exam.   -- check BG 1x/day, alternating times    -- Discussed diagnosis of DM, A1c goals, progression of disease, long term complications and tx options.    2. Essential hypertension  -- previously controlled  -- continue lisinopril   3. Mixed hyperlipidemia  -- controlled.   -- continue pravastatin (couldn't tolerate atorvastatin)  -- lipid panel with RTC  Lab Results   Component Value Date    LDLCALC 78.6 03/15/2022      4. Vitamin D deficiency -- previously uncontrolled.  -- start Vit D3 5,000 IU daily.  -- check Vit D with RTC         FOLLOW UP  Follow up in about 6 months (around 1/21/2023).   Patient instructed to bring BG logs to each follow up   Patient encouraged to call for any BG/medication issues, concerns, or questions.    Orders Placed This Encounter   Procedures    Hemoglobin A1C    Lipid Panel    Comprehensive Metabolic Panel    Microalbumin/Creatinine Ratio, Urine    Vitamin D

## 2022-10-23 ENCOUNTER — PATIENT MESSAGE (OUTPATIENT)
Dept: ENDOCRINOLOGY | Facility: CLINIC | Age: 52
End: 2022-10-23
Payer: COMMERCIAL

## 2022-10-24 ENCOUNTER — PATIENT MESSAGE (OUTPATIENT)
Dept: ENDOCRINOLOGY | Facility: CLINIC | Age: 52
End: 2022-10-24
Payer: COMMERCIAL

## 2022-10-24 RX ORDER — LISINOPRIL 20 MG/1
20 TABLET ORAL DAILY
Qty: 90 TABLET | Refills: 3 | Status: SHIPPED | OUTPATIENT
Start: 2022-10-24 | End: 2023-04-27 | Stop reason: SDUPTHER

## 2023-01-05 ENCOUNTER — PATIENT MESSAGE (OUTPATIENT)
Dept: ENDOCRINOLOGY | Facility: CLINIC | Age: 53
End: 2023-01-05
Payer: COMMERCIAL

## 2023-01-09 ENCOUNTER — PATIENT MESSAGE (OUTPATIENT)
Dept: ENDOCRINOLOGY | Facility: CLINIC | Age: 53
End: 2023-01-09
Payer: COMMERCIAL

## 2023-01-10 ENCOUNTER — LAB VISIT (OUTPATIENT)
Dept: LAB | Facility: HOSPITAL | Age: 53
End: 2023-01-10
Attending: NURSE PRACTITIONER
Payer: COMMERCIAL

## 2023-01-10 DIAGNOSIS — E55.9 VITAMIN D DEFICIENCY DISEASE: ICD-10-CM

## 2023-01-10 DIAGNOSIS — E11.40 TYPE 2 DIABETES MELLITUS WITH DIABETIC NEUROPATHY, WITHOUT LONG-TERM CURRENT USE OF INSULIN: ICD-10-CM

## 2023-01-10 LAB
25(OH)D3+25(OH)D2 SERPL-MCNC: 15 NG/ML (ref 30–96)
ALBUMIN SERPL BCP-MCNC: 3.4 G/DL (ref 3.5–5.2)
ALP SERPL-CCNC: 83 U/L (ref 55–135)
ALT SERPL W/O P-5'-P-CCNC: 28 U/L (ref 10–44)
ANION GAP SERPL CALC-SCNC: 11 MMOL/L (ref 8–16)
AST SERPL-CCNC: 13 U/L (ref 10–40)
BILIRUB SERPL-MCNC: 0.5 MG/DL (ref 0.1–1)
BUN SERPL-MCNC: 11 MG/DL (ref 6–20)
CALCIUM SERPL-MCNC: 9.2 MG/DL (ref 8.7–10.5)
CHLORIDE SERPL-SCNC: 107 MMOL/L (ref 95–110)
CHOLEST SERPL-MCNC: 143 MG/DL (ref 120–199)
CHOLEST/HDLC SERPL: 3.2 {RATIO} (ref 2–5)
CO2 SERPL-SCNC: 23 MMOL/L (ref 23–29)
CREAT SERPL-MCNC: 0.7 MG/DL (ref 0.5–1.4)
EST. GFR  (NO RACE VARIABLE): >60 ML/MIN/1.73 M^2
ESTIMATED AVG GLUCOSE: 120 MG/DL (ref 68–131)
GLUCOSE SERPL-MCNC: 101 MG/DL (ref 70–110)
HBA1C MFR BLD: 5.8 % (ref 4–5.6)
HDLC SERPL-MCNC: 45 MG/DL (ref 40–75)
HDLC SERPL: 31.5 % (ref 20–50)
LDLC SERPL CALC-MCNC: 75.2 MG/DL (ref 63–159)
NONHDLC SERPL-MCNC: 98 MG/DL
POTASSIUM SERPL-SCNC: 4.1 MMOL/L (ref 3.5–5.1)
PROT SERPL-MCNC: 6.8 G/DL (ref 6–8.4)
SODIUM SERPL-SCNC: 141 MMOL/L (ref 136–145)
TRIGL SERPL-MCNC: 114 MG/DL (ref 30–150)

## 2023-01-10 PROCEDURE — 82306 VITAMIN D 25 HYDROXY: CPT | Performed by: NURSE PRACTITIONER

## 2023-01-10 PROCEDURE — 80053 COMPREHEN METABOLIC PANEL: CPT | Performed by: NURSE PRACTITIONER

## 2023-01-10 PROCEDURE — 83036 HEMOGLOBIN GLYCOSYLATED A1C: CPT | Performed by: NURSE PRACTITIONER

## 2023-01-10 PROCEDURE — 80061 LIPID PANEL: CPT | Performed by: NURSE PRACTITIONER

## 2023-01-10 PROCEDURE — 36415 COLL VENOUS BLD VENIPUNCTURE: CPT | Mod: PN | Performed by: NURSE PRACTITIONER

## 2023-01-11 ENCOUNTER — OFFICE VISIT (OUTPATIENT)
Dept: ENDOCRINOLOGY | Facility: CLINIC | Age: 53
End: 2023-01-11
Payer: COMMERCIAL

## 2023-01-11 VITALS
HEART RATE: 96 BPM | BODY MASS INDEX: 22.71 KG/M2 | HEIGHT: 64 IN | SYSTOLIC BLOOD PRESSURE: 128 MMHG | DIASTOLIC BLOOD PRESSURE: 88 MMHG | WEIGHT: 133.06 LBS

## 2023-01-11 DIAGNOSIS — E55.9 VITAMIN D DEFICIENCY DISEASE: ICD-10-CM

## 2023-01-11 DIAGNOSIS — E11.40 TYPE 2 DIABETES MELLITUS WITH DIABETIC NEUROPATHY, WITHOUT LONG-TERM CURRENT USE OF INSULIN: Primary | ICD-10-CM

## 2023-01-11 DIAGNOSIS — E78.2 MIXED HYPERLIPIDEMIA: ICD-10-CM

## 2023-01-11 DIAGNOSIS — R53.83 FATIGUE, UNSPECIFIED TYPE: ICD-10-CM

## 2023-01-11 DIAGNOSIS — I10 ESSENTIAL HYPERTENSION: ICD-10-CM

## 2023-01-11 PROCEDURE — 1160F RVW MEDS BY RX/DR IN RCRD: CPT | Mod: CPTII,S$GLB,, | Performed by: NURSE PRACTITIONER

## 2023-01-11 PROCEDURE — 3044F PR MOST RECENT HEMOGLOBIN A1C LEVEL <7.0%: ICD-10-PCS | Mod: CPTII,S$GLB,, | Performed by: NURSE PRACTITIONER

## 2023-01-11 PROCEDURE — 3044F HG A1C LEVEL LT 7.0%: CPT | Mod: CPTII,S$GLB,, | Performed by: NURSE PRACTITIONER

## 2023-01-11 PROCEDURE — 1160F PR REVIEW ALL MEDS BY PRESCRIBER/CLIN PHARMACIST DOCUMENTED: ICD-10-PCS | Mod: CPTII,S$GLB,, | Performed by: NURSE PRACTITIONER

## 2023-01-11 PROCEDURE — 3008F PR BODY MASS INDEX (BMI) DOCUMENTED: ICD-10-PCS | Mod: CPTII,S$GLB,, | Performed by: NURSE PRACTITIONER

## 2023-01-11 PROCEDURE — 3079F PR MOST RECENT DIASTOLIC BLOOD PRESSURE 80-89 MM HG: ICD-10-PCS | Mod: CPTII,S$GLB,, | Performed by: NURSE PRACTITIONER

## 2023-01-11 PROCEDURE — 3060F PR POS MICROALBUMINURIA RESULT DOCUMENTED/REVIEW: ICD-10-PCS | Mod: CPTII,S$GLB,, | Performed by: NURSE PRACTITIONER

## 2023-01-11 PROCEDURE — 1159F MED LIST DOCD IN RCRD: CPT | Mod: CPTII,S$GLB,, | Performed by: NURSE PRACTITIONER

## 2023-01-11 PROCEDURE — 99999 PR PBB SHADOW E&M-EST. PATIENT-LVL IV: ICD-10-PCS | Mod: PBBFAC,,, | Performed by: NURSE PRACTITIONER

## 2023-01-11 PROCEDURE — 3074F PR MOST RECENT SYSTOLIC BLOOD PRESSURE < 130 MM HG: ICD-10-PCS | Mod: CPTII,S$GLB,, | Performed by: NURSE PRACTITIONER

## 2023-01-11 PROCEDURE — 3066F NEPHROPATHY DOC TX: CPT | Mod: CPTII,S$GLB,, | Performed by: NURSE PRACTITIONER

## 2023-01-11 PROCEDURE — 99999 PR PBB SHADOW E&M-EST. PATIENT-LVL IV: CPT | Mod: PBBFAC,,, | Performed by: NURSE PRACTITIONER

## 2023-01-11 PROCEDURE — 3060F POS MICROALBUMINURIA REV: CPT | Mod: CPTII,S$GLB,, | Performed by: NURSE PRACTITIONER

## 2023-01-11 PROCEDURE — 1159F PR MEDICATION LIST DOCUMENTED IN MEDICAL RECORD: ICD-10-PCS | Mod: CPTII,S$GLB,, | Performed by: NURSE PRACTITIONER

## 2023-01-11 PROCEDURE — 99214 PR OFFICE/OUTPT VISIT, EST, LEVL IV, 30-39 MIN: ICD-10-PCS | Mod: S$GLB,,, | Performed by: NURSE PRACTITIONER

## 2023-01-11 PROCEDURE — 99214 OFFICE O/P EST MOD 30 MIN: CPT | Mod: S$GLB,,, | Performed by: NURSE PRACTITIONER

## 2023-01-11 PROCEDURE — 3008F BODY MASS INDEX DOCD: CPT | Mod: CPTII,S$GLB,, | Performed by: NURSE PRACTITIONER

## 2023-01-11 PROCEDURE — 3074F SYST BP LT 130 MM HG: CPT | Mod: CPTII,S$GLB,, | Performed by: NURSE PRACTITIONER

## 2023-01-11 PROCEDURE — 3079F DIAST BP 80-89 MM HG: CPT | Mod: CPTII,S$GLB,, | Performed by: NURSE PRACTITIONER

## 2023-01-11 PROCEDURE — 3066F PR DOCUMENTATION OF TREATMENT FOR NEPHROPATHY: ICD-10-PCS | Mod: CPTII,S$GLB,, | Performed by: NURSE PRACTITIONER

## 2023-01-11 RX ORDER — DULAGLUTIDE 0.75 MG/.5ML
0.75 INJECTION, SOLUTION SUBCUTANEOUS
Qty: 12 PEN | Refills: 3 | Status: SHIPPED | OUTPATIENT
Start: 2023-01-11 | End: 2024-01-11

## 2023-01-11 RX ORDER — ERGOCALCIFEROL 1.25 MG/1
50000 CAPSULE ORAL
Qty: 12 CAPSULE | Refills: 1 | Status: SHIPPED | OUTPATIENT
Start: 2023-01-11 | End: 2023-06-28 | Stop reason: SDUPTHER

## 2023-01-11 NOTE — PROGRESS NOTES
CC: Ms. Kylee Burgos arrives today for management of Type 2 DM and review of chronic medical conditions, as listed in the Visit Diagnosis section of this encounter.       HPI: Ms. Kylee Burgos was diagnosed with Type 2 DM in 8/2017, after having recurrent yeast infections. A1c > 14% upon workup and she went to ER at time of diagnosis for glucose >600. C-peptide and DESMOND ab were normal. Initial treatment consisted of insulin and metformin. Jardiance and Trulicity were added soon after. She lost 25 lbs in 2017 and was able to discontinue insulin. Trulicity was later added but discontinued in 2/2019, due to A1c of 5.6% on triple therapy. + FH of DM in maternal GF. Denies hospitalizations due to DM.     Last seen by me in July.    BG readings are checked once daily, usually fasting. Recalls readings ranging 106-130.    Hypoglycemia: No    Missing Insulin/PO medication doses: No    Exercise: No.     Dietary Habits: Eats 2 meals/day. Usually skips breakfast. Rare snacking. Avoids sugary beverages.     Regarding Vit D deficiency, she is currently not taking any supplementation.     Compliant with lisinopril.     She is expecting first granddaughter in June.       CURRENT DIABETIC MEDS: metformin XR 1000 mg BID, Trulicity 0.75 mg weekly  Glucometer type: One Touch Verio    Previous DM treatments:  Januvia - not effective  Metformin 1000 mg - diarrhea  Farxiga - elevated glucoses when compared to Jardiance   Trulicity  Jardiance - cost  Steglatro - recurrent UTI  Glimepiride     Last Eye Exam: 2022, no DR  Last Podiatry Exam: 2018 for ingrown toe nails     REVIEW OF SYSTEMS  Constitutional: no c/o weakness, weight loss. + fatigue  Cardiac: no palpitations or chest pain.  Respiratory: no cough or dyspnea.  GI: no c/o nausea, abdominal pain. Denies h/o pancreatitis.   Skin: no lesions or rashes. + dry skin  Neuro: denies numbness, tingling, paresthesias  Endocrine: denies polyphagia, polydipsia,  "polyuria.      Personally reviewed Past Medical, Surgical, Social History.    Vital Signs  /88   Pulse 96   Ht 5' 4" (1.626 m)   Wt 60.3 kg (133 lb 0.8 oz)   BMI 22.84 kg/m²      Personally reviewed the below labs:    Hemoglobin A1C   Date Value Ref Range Status   01/10/2023 5.8 (H) 4.0 - 5.6 % Final     Comment:     ADA Screening Guidelines:  5.7-6.4%  Consistent with prediabetes  >or=6.5%  Consistent with diabetes    High levels of fetal hemoglobin interfere with the HbA1C  assay. Heterozygous hemoglobin variants (HbS, HgC, etc)do  not significantly interfere with this assay.   However, presence of multiple variants may affect accuracy.     07/14/2022 5.7 (H) 4.0 - 5.6 % Final     Comment:     ADA Screening Guidelines:  5.7-6.4%  Consistent with prediabetes  >or=6.5%  Consistent with diabetes    High levels of fetal hemoglobin interfere with the HbA1C  assay. Heterozygous hemoglobin variants (HbS, HgC, etc)do  not significantly interfere with this assay.   However, presence of multiple variants may affect accuracy.     03/15/2022 6.1 (H) 4.0 - 5.6 % Final     Comment:     ADA Screening Guidelines:  5.7-6.4%  Consistent with prediabetes  >or=6.5%  Consistent with diabetes    High levels of fetal hemoglobin interfere with the HbA1C  assay. Heterozygous hemoglobin variants (HbS, HgC, etc)do  not significantly interfere with this assay.   However, presence of multiple variants may affect accuracy.         Chemistry        Component Value Date/Time     01/10/2023 0802    K 4.1 01/10/2023 0802     01/10/2023 0802    CO2 23 01/10/2023 0802    BUN 11 01/10/2023 0802    CREATININE 0.7 01/10/2023 0802     01/10/2023 0802        Component Value Date/Time    CALCIUM 9.2 01/10/2023 0802    ALKPHOS 83 01/10/2023 0802    AST 13 01/10/2023 0802    ALT 28 01/10/2023 0802    BILITOT 0.5 01/10/2023 0802    ESTGFRAFRICA >60.0 03/15/2022 0748    EGFRNONAA >60.0 03/15/2022 0748          Lab Results "   Component Value Date    CHOL 143 01/10/2023    CHOL 168 03/15/2022    CHOL 186 07/07/2020     Lab Results   Component Value Date    HDL 45 01/10/2023    HDL 57 03/15/2022    HDL 61 07/07/2020     Lab Results   Component Value Date    LDLCALC 75.2 01/10/2023    LDLCALC 78.6 03/15/2022    LDLCALC 96.8 07/07/2020     Lab Results   Component Value Date    TRIG 114 01/10/2023    TRIG 162 (H) 03/15/2022    TRIG 141 07/07/2020     Lab Results   Component Value Date    CHOLHDL 31.5 01/10/2023    CHOLHDL 33.9 03/15/2022    CHOLHDL 32.8 07/07/2020       Lab Results   Component Value Date    MICALBCREAT 123.1 (H) 01/10/2023     Lab Results   Component Value Date    TSH 1.249 09/08/2020       CrCl cannot be calculated (Unknown ideal weight.).    Vit D, 25-Hydroxy   Date Value Ref Range Status   01/10/2023 15 (L) 30 - 96 ng/mL Final     Comment:     Vitamin D deficiency.........<10 ng/mL                              Vitamin D insufficiency......10-29 ng/mL       Vitamin D sufficiency........> or equal to 30 ng/mL  Vitamin D toxicity............>100 ng/mL         PHYSICAL EXAMINATION  Constitutional: Appears well, no distress  Neck: Supple, trachea midline.  Respiratory: CTA, even and unlabored.  Cardiovascular: RRR, no murmurs, no carotid bruits. No edema.    GI: active bowel sounds, no hernia noted.  Skin: warm and dry; no visible wounds  Neuro: oriented to person, place, time  Feet: appropriate footwear.  Protective Sensation (w/ 10 gram monofilament):  Right: Intact  Left: Intact    Visual Inspection:  Normal -  Bilateral    Pedal Pulses:   Right: Present  Left: Present    Posterior tibialis:   Right:Present  Left: Present          A1c target < 7%      Assessment/Plan  1. Type 2 diabetes mellitus with neuropathy, without long term insulin use status  -- A1c at goal. Tolerating medications well.   -- continue Trulicity, metformin XR  -- Optometry referral placed.   -- check BG 1x/day, alternating times    -- Discussed  diagnosis of DM, A1c goals, progression of disease, long term complications and tx options.    2. Essential hypertension  -- diastolic borderline elevated.   -- continue lisinopril and monitor   3. Mixed hyperlipidemia  -- controlled.   -- continue pravastatin (couldn't tolerate atorvastatin)   4. Vitamin D deficiency -- worsening   -- start ergo once weekly  -- check Vit D with RTC   5. Fatigue  -- will check B12 and TSH         FOLLOW UP  Follow up in about 6 months (around 7/11/2023).   Patient instructed to bring BG logs to each follow up   Patient encouraged to call for any BG/medication issues, concerns, or questions.    Orders Placed This Encounter   Procedures    TSH    Hemoglobin A1C    Vitamin B12    Basic Metabolic Panel    Vitamin D    Ambulatory referral/consult to Optometry

## 2023-01-18 ENCOUNTER — OFFICE VISIT (OUTPATIENT)
Dept: OPTOMETRY | Facility: CLINIC | Age: 53
End: 2023-01-18
Payer: COMMERCIAL

## 2023-01-18 DIAGNOSIS — E11.9 TYPE 2 DIABETES MELLITUS WITHOUT RETINOPATHY: Primary | ICD-10-CM

## 2023-01-18 DIAGNOSIS — H52.7 REFRACTIVE ERROR: ICD-10-CM

## 2023-01-18 DIAGNOSIS — E11.40 TYPE 2 DIABETES MELLITUS WITH DIABETIC NEUROPATHY, WITHOUT LONG-TERM CURRENT USE OF INSULIN: ICD-10-CM

## 2023-01-18 PROCEDURE — 2023F PR DILATED RETINAL EXAM W/O EVID OF RETINOPATHY: ICD-10-PCS | Mod: CPTII,S$GLB,, | Performed by: OPTOMETRIST

## 2023-01-18 PROCEDURE — 92004 COMPRE OPH EXAM NEW PT 1/>: CPT | Mod: S$GLB,,, | Performed by: OPTOMETRIST

## 2023-01-18 PROCEDURE — 3060F PR POS MICROALBUMINURIA RESULT DOCUMENTED/REVIEW: ICD-10-PCS | Mod: CPTII,S$GLB,, | Performed by: OPTOMETRIST

## 2023-01-18 PROCEDURE — 2023F DILAT RTA XM W/O RTNOPTHY: CPT | Mod: CPTII,S$GLB,, | Performed by: OPTOMETRIST

## 2023-01-18 PROCEDURE — 4010F PR ACE/ARB THEARPY RXD/TAKEN: ICD-10-PCS | Mod: CPTII,S$GLB,, | Performed by: OPTOMETRIST

## 2023-01-18 PROCEDURE — 92015 PR REFRACTION: ICD-10-PCS | Mod: S$GLB,,, | Performed by: OPTOMETRIST

## 2023-01-18 PROCEDURE — 99999 PR PBB SHADOW E&M-EST. PATIENT-LVL III: ICD-10-PCS | Mod: PBBFAC,,, | Performed by: OPTOMETRIST

## 2023-01-18 PROCEDURE — 3044F PR MOST RECENT HEMOGLOBIN A1C LEVEL <7.0%: ICD-10-PCS | Mod: CPTII,S$GLB,, | Performed by: OPTOMETRIST

## 2023-01-18 PROCEDURE — 4010F ACE/ARB THERAPY RXD/TAKEN: CPT | Mod: CPTII,S$GLB,, | Performed by: OPTOMETRIST

## 2023-01-18 PROCEDURE — 99999 PR PBB SHADOW E&M-EST. PATIENT-LVL III: CPT | Mod: PBBFAC,,, | Performed by: OPTOMETRIST

## 2023-01-18 PROCEDURE — 1160F RVW MEDS BY RX/DR IN RCRD: CPT | Mod: CPTII,S$GLB,, | Performed by: OPTOMETRIST

## 2023-01-18 PROCEDURE — 1160F PR REVIEW ALL MEDS BY PRESCRIBER/CLIN PHARMACIST DOCUMENTED: ICD-10-PCS | Mod: CPTII,S$GLB,, | Performed by: OPTOMETRIST

## 2023-01-18 PROCEDURE — 92004 PR EYE EXAM, NEW PATIENT,COMPREHESV: ICD-10-PCS | Mod: S$GLB,,, | Performed by: OPTOMETRIST

## 2023-01-18 PROCEDURE — 92015 DETERMINE REFRACTIVE STATE: CPT | Mod: S$GLB,,, | Performed by: OPTOMETRIST

## 2023-01-18 PROCEDURE — 1159F MED LIST DOCD IN RCRD: CPT | Mod: CPTII,S$GLB,, | Performed by: OPTOMETRIST

## 2023-01-18 PROCEDURE — 3060F POS MICROALBUMINURIA REV: CPT | Mod: CPTII,S$GLB,, | Performed by: OPTOMETRIST

## 2023-01-18 PROCEDURE — 1159F PR MEDICATION LIST DOCUMENTED IN MEDICAL RECORD: ICD-10-PCS | Mod: CPTII,S$GLB,, | Performed by: OPTOMETRIST

## 2023-01-18 PROCEDURE — 3066F NEPHROPATHY DOC TX: CPT | Mod: CPTII,S$GLB,, | Performed by: OPTOMETRIST

## 2023-01-18 PROCEDURE — 3044F HG A1C LEVEL LT 7.0%: CPT | Mod: CPTII,S$GLB,, | Performed by: OPTOMETRIST

## 2023-01-18 PROCEDURE — 3066F PR DOCUMENTATION OF TREATMENT FOR NEPHROPATHY: ICD-10-PCS | Mod: CPTII,S$GLB,, | Performed by: OPTOMETRIST

## 2023-01-18 NOTE — PROGRESS NOTES
HPI    New pt here for diabetic eye exam. Last exam- 1 year    Pt sts [facility] Ou is blurry. Pt needs new rx. Pt denies flashes/floaters/pain.   Pt denies use of gtt. Pt sts DM is under control with meds, runs between   110-120. Pt sts Htn is under control with meds.   Last edited by Rachel Sands on 1/18/2023  2:42 PM.            Assessment /Plan     For exam results, see Encounter Report.    Type 2 diabetes mellitus without retinopathy    Type 2 diabetes mellitus with diabetic neuropathy, without long-term current use of insulin  -     Ambulatory referral/consult to Optometry    Refractive error      1,2. No diabetic retinopathy, no csme. Return in 1 year for dilated eye exam.   3. Spectacle Rx given, discussed different options for glasses. RTC 1 year routine eye exam.

## 2023-02-01 ENCOUNTER — PATIENT MESSAGE (OUTPATIENT)
Dept: FAMILY MEDICINE | Facility: CLINIC | Age: 53
End: 2023-02-01
Payer: COMMERCIAL

## 2023-02-01 ENCOUNTER — PATIENT MESSAGE (OUTPATIENT)
Dept: ENDOCRINOLOGY | Facility: CLINIC | Age: 53
End: 2023-02-01
Payer: COMMERCIAL

## 2023-02-02 ENCOUNTER — OFFICE VISIT (OUTPATIENT)
Dept: FAMILY MEDICINE | Facility: CLINIC | Age: 53
End: 2023-02-02
Payer: COMMERCIAL

## 2023-02-02 VITALS
HEART RATE: 84 BPM | SYSTOLIC BLOOD PRESSURE: 128 MMHG | DIASTOLIC BLOOD PRESSURE: 76 MMHG | WEIGHT: 135.06 LBS | BODY MASS INDEX: 23.06 KG/M2 | RESPIRATION RATE: 18 BRPM | HEIGHT: 64 IN

## 2023-02-02 DIAGNOSIS — I10 ESSENTIAL HYPERTENSION: Primary | ICD-10-CM

## 2023-02-02 PROCEDURE — 3044F PR MOST RECENT HEMOGLOBIN A1C LEVEL <7.0%: ICD-10-PCS | Mod: CPTII,S$GLB,, | Performed by: STUDENT IN AN ORGANIZED HEALTH CARE EDUCATION/TRAINING PROGRAM

## 2023-02-02 PROCEDURE — 99999 PR PBB SHADOW E&M-EST. PATIENT-LVL IV: CPT | Mod: PBBFAC,,, | Performed by: STUDENT IN AN ORGANIZED HEALTH CARE EDUCATION/TRAINING PROGRAM

## 2023-02-02 PROCEDURE — 99213 PR OFFICE/OUTPT VISIT, EST, LEVL III, 20-29 MIN: ICD-10-PCS | Mod: S$GLB,,, | Performed by: STUDENT IN AN ORGANIZED HEALTH CARE EDUCATION/TRAINING PROGRAM

## 2023-02-02 PROCEDURE — 4010F ACE/ARB THERAPY RXD/TAKEN: CPT | Mod: CPTII,S$GLB,, | Performed by: STUDENT IN AN ORGANIZED HEALTH CARE EDUCATION/TRAINING PROGRAM

## 2023-02-02 PROCEDURE — 3066F PR DOCUMENTATION OF TREATMENT FOR NEPHROPATHY: ICD-10-PCS | Mod: CPTII,S$GLB,, | Performed by: STUDENT IN AN ORGANIZED HEALTH CARE EDUCATION/TRAINING PROGRAM

## 2023-02-02 PROCEDURE — 3060F POS MICROALBUMINURIA REV: CPT | Mod: CPTII,S$GLB,, | Performed by: STUDENT IN AN ORGANIZED HEALTH CARE EDUCATION/TRAINING PROGRAM

## 2023-02-02 PROCEDURE — 3066F NEPHROPATHY DOC TX: CPT | Mod: CPTII,S$GLB,, | Performed by: STUDENT IN AN ORGANIZED HEALTH CARE EDUCATION/TRAINING PROGRAM

## 2023-02-02 PROCEDURE — 1160F PR REVIEW ALL MEDS BY PRESCRIBER/CLIN PHARMACIST DOCUMENTED: ICD-10-PCS | Mod: CPTII,S$GLB,, | Performed by: STUDENT IN AN ORGANIZED HEALTH CARE EDUCATION/TRAINING PROGRAM

## 2023-02-02 PROCEDURE — 3078F PR MOST RECENT DIASTOLIC BLOOD PRESSURE < 80 MM HG: ICD-10-PCS | Mod: CPTII,S$GLB,, | Performed by: STUDENT IN AN ORGANIZED HEALTH CARE EDUCATION/TRAINING PROGRAM

## 2023-02-02 PROCEDURE — 1159F PR MEDICATION LIST DOCUMENTED IN MEDICAL RECORD: ICD-10-PCS | Mod: CPTII,S$GLB,, | Performed by: STUDENT IN AN ORGANIZED HEALTH CARE EDUCATION/TRAINING PROGRAM

## 2023-02-02 PROCEDURE — 3078F DIAST BP <80 MM HG: CPT | Mod: CPTII,S$GLB,, | Performed by: STUDENT IN AN ORGANIZED HEALTH CARE EDUCATION/TRAINING PROGRAM

## 2023-02-02 PROCEDURE — 3074F SYST BP LT 130 MM HG: CPT | Mod: CPTII,S$GLB,, | Performed by: STUDENT IN AN ORGANIZED HEALTH CARE EDUCATION/TRAINING PROGRAM

## 2023-02-02 PROCEDURE — 3074F PR MOST RECENT SYSTOLIC BLOOD PRESSURE < 130 MM HG: ICD-10-PCS | Mod: CPTII,S$GLB,, | Performed by: STUDENT IN AN ORGANIZED HEALTH CARE EDUCATION/TRAINING PROGRAM

## 2023-02-02 PROCEDURE — 3008F BODY MASS INDEX DOCD: CPT | Mod: CPTII,S$GLB,, | Performed by: STUDENT IN AN ORGANIZED HEALTH CARE EDUCATION/TRAINING PROGRAM

## 2023-02-02 PROCEDURE — 4010F PR ACE/ARB THEARPY RXD/TAKEN: ICD-10-PCS | Mod: CPTII,S$GLB,, | Performed by: STUDENT IN AN ORGANIZED HEALTH CARE EDUCATION/TRAINING PROGRAM

## 2023-02-02 PROCEDURE — 3008F PR BODY MASS INDEX (BMI) DOCUMENTED: ICD-10-PCS | Mod: CPTII,S$GLB,, | Performed by: STUDENT IN AN ORGANIZED HEALTH CARE EDUCATION/TRAINING PROGRAM

## 2023-02-02 PROCEDURE — 1159F MED LIST DOCD IN RCRD: CPT | Mod: CPTII,S$GLB,, | Performed by: STUDENT IN AN ORGANIZED HEALTH CARE EDUCATION/TRAINING PROGRAM

## 2023-02-02 PROCEDURE — 1160F RVW MEDS BY RX/DR IN RCRD: CPT | Mod: CPTII,S$GLB,, | Performed by: STUDENT IN AN ORGANIZED HEALTH CARE EDUCATION/TRAINING PROGRAM

## 2023-02-02 PROCEDURE — 3060F PR POS MICROALBUMINURIA RESULT DOCUMENTED/REVIEW: ICD-10-PCS | Mod: CPTII,S$GLB,, | Performed by: STUDENT IN AN ORGANIZED HEALTH CARE EDUCATION/TRAINING PROGRAM

## 2023-02-02 PROCEDURE — 3044F HG A1C LEVEL LT 7.0%: CPT | Mod: CPTII,S$GLB,, | Performed by: STUDENT IN AN ORGANIZED HEALTH CARE EDUCATION/TRAINING PROGRAM

## 2023-02-02 PROCEDURE — 99213 OFFICE O/P EST LOW 20 MIN: CPT | Mod: S$GLB,,, | Performed by: STUDENT IN AN ORGANIZED HEALTH CARE EDUCATION/TRAINING PROGRAM

## 2023-02-02 PROCEDURE — 99999 PR PBB SHADOW E&M-EST. PATIENT-LVL IV: ICD-10-PCS | Mod: PBBFAC,,, | Performed by: STUDENT IN AN ORGANIZED HEALTH CARE EDUCATION/TRAINING PROGRAM

## 2023-02-02 NOTE — PROGRESS NOTES
"Plan:      Kylee was seen today for hypertension, follow-up and medication problem.    Diagnoses and all orders for this visit:    Essential hypertension    Continue Lisinopril 20 mg daily. Plan for home BP log for the next 2 weeks and return for nurse visit.    Follow up in about 2 weeks (around 2/16/2023), or if symptoms worsen or fail to improve.    Emeli Lozano MD  02/02/2023    Subjective:      Patient ID: Kylee Burgos is a 52 y.o. female    Chief Complaint   Patient presents with    Hypertension    Follow-up     Hospital follow up     Medication Problem     HPI  52 y.o. female with a PMHx as documented below presents to clinic today for the following:    Pt s/p ED visit on 2/1/23 for HTN - BP 180s/110s. Associated symptoms include chest pain the day before and headache yesterday. ACS workup negative. At time of discharge, pt reports BP 150s/90s. Pt reports compliance w/ medication regimen - lisinopril 20 mg daily. She has not been taking her BP at home but plans to start today (getting home cuff after today's visit). Pt asymptomatic at today's visit with BP within normal limits.    Past Medical History:   Diagnosis Date    Diabetes mellitus, type 2     Fibromyalgia     Hyperlipidemia     Hypertension     Scleroderma       Current Outpatient Medications   Medication Instructions    BD INSULIN PEN NEEDLE UF MINI 31 gauge x 3/16" Ndle No dose, route, or frequency recorded.    blood sugar diagnostic Strp To check BG 1 time daily, to use with insurance preferred meter    blood-glucose meter kit To check BG 1 time daily, to use with insurance preferred meter    DULoxetine (CYMBALTA) 60 mg, Oral, Daily    ergocalciferol (ERGOCALCIFEROL) 50,000 Units, Oral, Every 7 days    lancets 33 gauge Misc 1 lancet, Misc.(Non-Drug; Combo Route), 4 times daily    lancets Misc To check BG 1 time daily, to use with insurance preferred meter    lisinopriL (PRINIVIL,ZESTRIL) 20 mg, Oral, Daily    metFORMIN (GLUCOPHAGE-XR) " "500 MG ER 24hr tablet TAKE 2 TABLETS BY MOUTH TWICE A DAY WITH MEALS    ondansetron (ZOFRAN-ODT) 8 mg, Oral, Every 8 hours PRN    ONETOUCH VERIO TEST STRIPS Strp USE TO TEST ONCE DAILY    pravastatin (PRAVACHOL) 20 MG tablet TAKE ONE TABLET BY MOUTH EVERY DAY IN THE EVENING    TRULICITY 0.75 mg, Subcutaneous, Every 7 days      ROS  Constitutional:  Negative for chills and fever.   Respiratory:  Negative for shortness of breath.    Cardiovascular:  Negative for chest pain.   Gastrointestinal:  Negative for abdominal pain, constipation, diarrhea, nausea and vomiting.     Objective:      Vitals:    02/02/23 1143   BP: 128/76   BP Location: Right arm   Patient Position: Sitting   Pulse: 84   Resp: 18   Weight: 61.3 kg (135 lb 0.5 oz)   Height: 5' 4" (1.626 m)     Body mass index is 23.18 kg/m².    Physical Exam   Constitutional:       General: She is not in acute distress.  HENT:      Head: Normocephalic and atraumatic.   Pulmonary:      Effort: Pulmonary effort is normal. No respiratory distress.   Neurological:      General: No focal deficit present.      Mental Status: She is alert and oriented to person, place, and time. Mental status is at baseline.     Assessment:       1. Essential hypertension        Emeli Lozano MD  [facility]  Office: [phone]   Fax: [phone]  02/02/2023      Disclaimer: This note was partly generated using dictation software which may occasionally result in transcription errors.    Total time spent on this encounter includes face to face time and non-face to face time preparing to see the patient (eg, review of tests), obtaining and/or reviewing separately obtained history, documenting clinical information in the electronic or other health record, independently interpreting results and communicating results to the patient/family/caregiver, or care coordinator.      "

## 2023-02-03 ENCOUNTER — PATIENT MESSAGE (OUTPATIENT)
Dept: FAMILY MEDICINE | Facility: CLINIC | Age: 53
End: 2023-02-03
Payer: COMMERCIAL

## 2023-02-03 NOTE — TELEPHONE ENCOUNTER
See pt message and other encounter, pt is taking Lisinopril daily. Was at ER Wednesday, saw Blake Thursday. Pressure is now 169/100. Please advise

## 2023-02-03 NOTE — TELEPHONE ENCOUNTER
Spoke with pt. Reports she has recent refill of #90 day supply and able to take 2 tablets for 40 mg dose. Scheduled for BP check on Tues 2/7/23 at 9:45 AM. Re instructed pt per Dr. Crane recommendations to report to ER if BP remains elevated with increase dose of BP med and/or if s/s start. Verbalize understanding.

## 2023-02-05 ENCOUNTER — PATIENT MESSAGE (OUTPATIENT)
Dept: FAMILY MEDICINE | Facility: CLINIC | Age: 53
End: 2023-02-05
Payer: COMMERCIAL

## 2023-02-07 ENCOUNTER — CLINICAL SUPPORT (OUTPATIENT)
Dept: FAMILY MEDICINE | Facility: CLINIC | Age: 53
End: 2023-02-07
Payer: COMMERCIAL

## 2023-02-07 ENCOUNTER — TELEPHONE (OUTPATIENT)
Dept: FAMILY MEDICINE | Facility: CLINIC | Age: 53
End: 2023-02-07
Payer: COMMERCIAL

## 2023-02-07 ENCOUNTER — PATIENT MESSAGE (OUTPATIENT)
Dept: ENDOCRINOLOGY | Facility: CLINIC | Age: 53
End: 2023-02-07
Payer: COMMERCIAL

## 2023-02-07 ENCOUNTER — PATIENT MESSAGE (OUTPATIENT)
Dept: FAMILY MEDICINE | Facility: CLINIC | Age: 53
End: 2023-02-07

## 2023-02-07 VITALS — DIASTOLIC BLOOD PRESSURE: 80 MMHG | SYSTOLIC BLOOD PRESSURE: 138 MMHG

## 2023-02-07 PROCEDURE — 99999 PR PBB SHADOW E&M-EST. PATIENT-LVL I: CPT | Mod: PBBFAC,,,

## 2023-02-07 PROCEDURE — 99999 PR PBB SHADOW E&M-EST. PATIENT-LVL I: ICD-10-PCS | Mod: PBBFAC,,,

## 2023-02-07 NOTE — TELEPHONE ENCOUNTER
----- Message from Srinivasan Sam sent at 2/7/2023 10:57 AM CST -----  Contact: Patient  Type:  Patient Call          Who Called: patient         Does the patient know what this is regarding?: Requesting a call back to have a appt scheduled ; pt said that she was seen in the ER on 2/1 & 2/5 for her pressure ; please advise           Would the patient rather a call back or a response via [facility]? Call           Best Call Back Number: [phone]             Additional Information:

## 2023-02-07 NOTE — TELEPHONE ENCOUNTER
Pt requested an appt with an available provider in [facility].  Scheduled first available to est care and eval bp

## 2023-02-07 NOTE — PROGRESS NOTES
"Kylee Burgos 52 y.o. female is here today for Blood Pressure check.   History of HTN yes.    Review of patient's allergies indicates:  No Known Allergies  Creatinine   Date Value Ref Range Status   02/01/2023 0.62 0.50 - 1.40 mg/dL Final     Sodium   Date Value Ref Range Status   02/01/2023 143 136 - 145 mmol/L Final     Potassium   Date Value Ref Range Status   02/01/2023 3.8 3.5 - 5.1 mmol/L Final   ]  Patient verifies taking blood pressure medications on a regular basis at the same time of the day.     Current Outpatient Medications:     BD INSULIN PEN NEEDLE UF MINI 31 gauge x 3/16" Ndle, , Disp: , Rfl:     blood sugar diagnostic Strp, To check BG 1 time daily, to use with insurance preferred meter, Disp: 100 each, Rfl: 11    blood-glucose meter kit, To check BG 1 time daily, to use with insurance preferred meter, Disp: 1 each, Rfl: 0    dulaglutide (TRULICITY) 0.75 mg/0.5 mL pen injector, Inject 0.75 mg into the skin every 7 days., Disp: 12 pen, Rfl: 3    DULoxetine (CYMBALTA) 60 MG capsule, Take 1 capsule (60 mg total) by mouth once daily., Disp: 90 capsule, Rfl: 3    ergocalciferol (ERGOCALCIFEROL) 50,000 unit Cap, Take 1 capsule (50,000 Units total) by mouth every 7 days., Disp: 12 capsule, Rfl: 1    lancets 33 gauge Misc, 1 lancet by Misc.(Non-Drug; Combo Route) route 4 (four) times daily., Disp: , Rfl:     lancets Misc, To check BG 1 time daily, to use with insurance preferred meter, Disp: 100 each, Rfl: 11    lisinopriL (PRINIVIL,ZESTRIL) 20 MG tablet, Take 1 tablet (20 mg total) by mouth once daily. (Patient taking differently: Take 40 mg by mouth once daily.), Disp: 90 tablet, Rfl: 3    metFORMIN (GLUCOPHAGE-XR) 500 MG ER 24hr tablet, TAKE 2 TABLETS BY MOUTH TWICE A DAY WITH MEALS, Disp: 360 tablet, Rfl: 3    ondansetron (ZOFRAN-ODT) 8 MG TbDL, Take 1 tablet (8 mg total) by mouth every 8 (eight) hours as needed (for nausea)., Disp: 20 tablet, Rfl: 1    ONETOUCH VERIO TEST STRIPS Strp, USE TO " TEST ONCE DAILY, Disp: 100 strip, Rfl: 2    pravastatin (PRAVACHOL) 20 MG tablet, TAKE ONE TABLET BY MOUTH EVERY DAY IN THE EVENING, Disp: 90 tablet, Rfl: 3  Does patient have record of home blood pressure readings yes. Readings have been averaging yes.   Last dose of blood pressure medication was taken at 7 am.  Patient is asymptomatic.   Complains of nausea .      ,   .    Blood pressure reading after 15 minutes was 138/80, Pulse 88.  Dr. Crane notified.     Pt was notified with 2 identifiers  an Full name.

## 2023-03-01 DIAGNOSIS — Z12.31 OTHER SCREENING MAMMOGRAM: ICD-10-CM

## 2023-03-06 ENCOUNTER — PATIENT MESSAGE (OUTPATIENT)
Dept: ADMINISTRATIVE | Facility: HOSPITAL | Age: 53
End: 2023-03-06
Payer: COMMERCIAL

## 2023-04-03 ENCOUNTER — PATIENT MESSAGE (OUTPATIENT)
Dept: ENDOCRINOLOGY | Facility: CLINIC | Age: 53
End: 2023-04-03
Payer: COMMERCIAL

## 2023-04-04 RX ORDER — LISINOPRIL 20 MG/1
TABLET ORAL
Qty: 90 TABLET | Refills: 3 | OUTPATIENT
Start: 2023-04-04

## 2023-04-04 NOTE — TELEPHONE ENCOUNTER
Please send refill request to Dr. Crane, who is actively adjusting her dose of this medication. Please be sure to change to 2 of the 20 mg tabs in the request

## 2023-04-05 NOTE — TELEPHONE ENCOUNTER
Asked patient to clarify who her PCP is. I had only seen her once almost 3 years ago and will not be managing her BP without seeing her for this.

## 2023-04-11 ENCOUNTER — PATIENT MESSAGE (OUTPATIENT)
Dept: ADMINISTRATIVE | Facility: HOSPITAL | Age: 53
End: 2023-04-11
Payer: COMMERCIAL

## 2023-04-27 ENCOUNTER — OFFICE VISIT (OUTPATIENT)
Dept: FAMILY MEDICINE | Facility: CLINIC | Age: 53
End: 2023-04-27
Payer: COMMERCIAL

## 2023-04-27 VITALS
TEMPERATURE: 99 F | DIASTOLIC BLOOD PRESSURE: 80 MMHG | SYSTOLIC BLOOD PRESSURE: 146 MMHG | HEART RATE: 106 BPM | OXYGEN SATURATION: 99 % | RESPIRATION RATE: 20 BRPM | HEIGHT: 63 IN | BODY MASS INDEX: 24.73 KG/M2 | WEIGHT: 139.56 LBS

## 2023-04-27 DIAGNOSIS — I10 HYPERTENSION, UNSPECIFIED TYPE: Primary | ICD-10-CM

## 2023-04-27 PROCEDURE — 3077F PR MOST RECENT SYSTOLIC BLOOD PRESSURE >= 140 MM HG: ICD-10-PCS | Mod: CPTII,S$GLB,, | Performed by: NURSE PRACTITIONER

## 2023-04-27 PROCEDURE — 3077F SYST BP >= 140 MM HG: CPT | Mod: CPTII,S$GLB,, | Performed by: NURSE PRACTITIONER

## 2023-04-27 PROCEDURE — 3008F PR BODY MASS INDEX (BMI) DOCUMENTED: ICD-10-PCS | Mod: CPTII,S$GLB,, | Performed by: NURSE PRACTITIONER

## 2023-04-27 PROCEDURE — 3060F PR POS MICROALBUMINURIA RESULT DOCUMENTED/REVIEW: ICD-10-PCS | Mod: CPTII,S$GLB,, | Performed by: NURSE PRACTITIONER

## 2023-04-27 PROCEDURE — 4010F PR ACE/ARB THEARPY RXD/TAKEN: ICD-10-PCS | Mod: CPTII,S$GLB,, | Performed by: NURSE PRACTITIONER

## 2023-04-27 PROCEDURE — 3066F PR DOCUMENTATION OF TREATMENT FOR NEPHROPATHY: ICD-10-PCS | Mod: CPTII,S$GLB,, | Performed by: NURSE PRACTITIONER

## 2023-04-27 PROCEDURE — 1159F MED LIST DOCD IN RCRD: CPT | Mod: CPTII,S$GLB,, | Performed by: NURSE PRACTITIONER

## 2023-04-27 PROCEDURE — 99214 PR OFFICE/OUTPT VISIT, EST, LEVL IV, 30-39 MIN: ICD-10-PCS | Mod: S$GLB,,, | Performed by: NURSE PRACTITIONER

## 2023-04-27 PROCEDURE — 4010F ACE/ARB THERAPY RXD/TAKEN: CPT | Mod: CPTII,S$GLB,, | Performed by: NURSE PRACTITIONER

## 2023-04-27 PROCEDURE — 3066F NEPHROPATHY DOC TX: CPT | Mod: CPTII,S$GLB,, | Performed by: NURSE PRACTITIONER

## 2023-04-27 PROCEDURE — 3060F POS MICROALBUMINURIA REV: CPT | Mod: CPTII,S$GLB,, | Performed by: NURSE PRACTITIONER

## 2023-04-27 PROCEDURE — 3079F PR MOST RECENT DIASTOLIC BLOOD PRESSURE 80-89 MM HG: ICD-10-PCS | Mod: CPTII,S$GLB,, | Performed by: NURSE PRACTITIONER

## 2023-04-27 PROCEDURE — 3079F DIAST BP 80-89 MM HG: CPT | Mod: CPTII,S$GLB,, | Performed by: NURSE PRACTITIONER

## 2023-04-27 PROCEDURE — 3008F BODY MASS INDEX DOCD: CPT | Mod: CPTII,S$GLB,, | Performed by: NURSE PRACTITIONER

## 2023-04-27 PROCEDURE — 1159F PR MEDICATION LIST DOCUMENTED IN MEDICAL RECORD: ICD-10-PCS | Mod: CPTII,S$GLB,, | Performed by: NURSE PRACTITIONER

## 2023-04-27 PROCEDURE — 99214 OFFICE O/P EST MOD 30 MIN: CPT | Mod: S$GLB,,, | Performed by: NURSE PRACTITIONER

## 2023-04-27 PROCEDURE — 3044F HG A1C LEVEL LT 7.0%: CPT | Mod: CPTII,S$GLB,, | Performed by: NURSE PRACTITIONER

## 2023-04-27 PROCEDURE — 3044F PR MOST RECENT HEMOGLOBIN A1C LEVEL <7.0%: ICD-10-PCS | Mod: CPTII,S$GLB,, | Performed by: NURSE PRACTITIONER

## 2023-04-27 RX ORDER — LISINOPRIL 40 MG/1
40 TABLET ORAL DAILY
Qty: 90 TABLET | Refills: 3 | Status: SHIPPED | OUTPATIENT
Start: 2023-04-27

## 2023-04-27 NOTE — PROGRESS NOTES
"Subjective:       Patient ID: Kylee Burgos is a 52 y.o. female.    Chief Complaint: Hospital Follow Up  Pt is here for hospital f/u form ER visit on 2/5/23, workup neg.  Workup neg and lisinopril was increase to 40 mg 2 days before, so ER did not change anything.   Patient tells me that her doctor's office told her to increase to the 40 mg but did not change the prescription.  She is now back to only on 20 mg of lisinopril not run out.  No CP or palpitations.    HPI  Review of Systems   Constitutional:  Negative for appetite change, chills and fever.   HENT:  Negative for ear pain and postnasal drip.    Eyes:  Negative for pain and itching.   Respiratory:  Negative for chest tightness and shortness of breath.    Gastrointestinal:  Negative for abdominal distention and abdominal pain.   Endocrine: Negative for polydipsia and polyuria.   Genitourinary:  Negative for difficulty urinating and flank pain.   Skin:  Negative for color change and pallor.   Neurological:  Negative for light-headedness and headaches.   Hematological:  Negative for adenopathy. Does not bruise/bleed easily.   Psychiatric/Behavioral:  Negative for agitation.      Past medical, surgical, family and social history reviewed.  Objective:     Vitals:    04/27/23 1042   BP: (!) 146/80   Pulse: 106   Resp: 20   Temp: 98.6 °F (37 °C)   SpO2: 99%   Weight: 63.3 kg (139 lb 8.8 oz)   Height: 5' 3" (1.6 m)   PainSc: 0-No pain     Body mass index is 24.72 kg/m².     Physical Exam  Constitutional:       Appearance: She is well-developed.   HENT:      Head: Normocephalic and atraumatic.      Right Ear: External ear normal.      Left Ear: External ear normal.      Nose: Nose normal.   Eyes:      General: No scleral icterus.        Right eye: No discharge.         Left eye: No discharge.      Conjunctiva/sclera: Conjunctivae normal.   Neck:      Trachea: No tracheal deviation.   Cardiovascular:      Rate and Rhythm: Normal rate and regular rhythm.      " Heart sounds: Normal heart sounds. No murmur heard.    No friction rub.   Pulmonary:      Effort: Pulmonary effort is normal. No respiratory distress.      Breath sounds: Normal breath sounds. No stridor. No wheezing or rales.   Chest:      Chest wall: No tenderness.   Musculoskeletal:         General: Normal range of motion.      Cervical back: Normal range of motion and neck supple.   Lymphadenopathy:      Cervical: No cervical adenopathy.   Skin:     General: Skin is warm and dry.   Neurological:      Mental Status: She is alert and oriented to person, place, and time.       Assessment:       1. Hypertension, unspecified type        Plan:       Kylee was seen today for hospital follow up.    Diagnoses and all orders for this visit:    Hypertension, unspecified type    Suboptimally controlled increase lisinopril to 40 mg daily   -     lisinopriL (PRINIVIL,ZESTRIL) 40 MG tablet; Take 1 tablet (40 mg total) by mouth once daily.    Pt is looking for primary care provider, referred her to providers at [facility] that are accepting new patients.       I spent 30 minutes on this encounter, time includes face-to-face, chart review, documentation, test review and orders.

## 2023-05-10 ENCOUNTER — TELEPHONE (OUTPATIENT)
Dept: FAMILY MEDICINE | Facility: CLINIC | Age: 53
End: 2023-05-10

## 2023-05-10 NOTE — TELEPHONE ENCOUNTER
----- Message from Noreen Dickson sent at 5/10/2023  3:07 PM CDT -----  Contact: pt  Type:  Patient Returning Call    Who Called:  pt   Who Left Message for Patient:  n/a   Does the patient know what this is regarding?:  yes  Best Call Back Number:  [phone]    Additional Information:  pt is trying to return a call. Please advise.

## 2023-05-10 NOTE — TELEPHONE ENCOUNTER
"Contacted pt for nurse visit to obtain home bp log. Pt reports on average her bp has remained in the 140's/80's, with a heart-rate ranging from . Pt states she is taking her bp medication lisinopril 40mg daily at the same time of day "around 7:30 in the morning". Pt denies pain and reports being asymptomatic. Please advise.  "

## 2023-05-15 NOTE — TELEPHONE ENCOUNTER
Ok no changes for now  Looks like she has an appointment to establish care soon in [place]  Continue to monitor BP so she can let them know

## 2023-05-17 ENCOUNTER — OFFICE VISIT (OUTPATIENT)
Dept: FAMILY MEDICINE | Facility: CLINIC | Age: 53
End: 2023-05-17
Payer: COMMERCIAL

## 2023-05-17 VITALS
WEIGHT: 138.69 LBS | DIASTOLIC BLOOD PRESSURE: 96 MMHG | HEART RATE: 98 BPM | SYSTOLIC BLOOD PRESSURE: 152 MMHG | OXYGEN SATURATION: 96 % | HEIGHT: 63 IN | BODY MASS INDEX: 24.57 KG/M2

## 2023-05-17 DIAGNOSIS — M79.7 FIBROMYALGIA: ICD-10-CM

## 2023-05-17 DIAGNOSIS — I10 ESSENTIAL HYPERTENSION: Primary | ICD-10-CM

## 2023-05-17 PROCEDURE — 3044F HG A1C LEVEL LT 7.0%: CPT | Mod: CPTII,S$GLB,, | Performed by: STUDENT IN AN ORGANIZED HEALTH CARE EDUCATION/TRAINING PROGRAM

## 2023-05-17 PROCEDURE — 3080F PR MOST RECENT DIASTOLIC BLOOD PRESSURE >= 90 MM HG: ICD-10-PCS | Mod: CPTII,S$GLB,, | Performed by: STUDENT IN AN ORGANIZED HEALTH CARE EDUCATION/TRAINING PROGRAM

## 2023-05-17 PROCEDURE — 4010F ACE/ARB THERAPY RXD/TAKEN: CPT | Mod: CPTII,S$GLB,, | Performed by: STUDENT IN AN ORGANIZED HEALTH CARE EDUCATION/TRAINING PROGRAM

## 2023-05-17 PROCEDURE — 3008F BODY MASS INDEX DOCD: CPT | Mod: CPTII,S$GLB,, | Performed by: STUDENT IN AN ORGANIZED HEALTH CARE EDUCATION/TRAINING PROGRAM

## 2023-05-17 PROCEDURE — 99999 PR PBB SHADOW E&M-EST. PATIENT-LVL V: CPT | Mod: PBBFAC,,, | Performed by: STUDENT IN AN ORGANIZED HEALTH CARE EDUCATION/TRAINING PROGRAM

## 2023-05-17 PROCEDURE — 3077F SYST BP >= 140 MM HG: CPT | Mod: CPTII,S$GLB,, | Performed by: STUDENT IN AN ORGANIZED HEALTH CARE EDUCATION/TRAINING PROGRAM

## 2023-05-17 PROCEDURE — 1159F MED LIST DOCD IN RCRD: CPT | Mod: CPTII,S$GLB,, | Performed by: STUDENT IN AN ORGANIZED HEALTH CARE EDUCATION/TRAINING PROGRAM

## 2023-05-17 PROCEDURE — 3077F PR MOST RECENT SYSTOLIC BLOOD PRESSURE >= 140 MM HG: ICD-10-PCS | Mod: CPTII,S$GLB,, | Performed by: STUDENT IN AN ORGANIZED HEALTH CARE EDUCATION/TRAINING PROGRAM

## 2023-05-17 PROCEDURE — 4010F PR ACE/ARB THEARPY RXD/TAKEN: ICD-10-PCS | Mod: CPTII,S$GLB,, | Performed by: STUDENT IN AN ORGANIZED HEALTH CARE EDUCATION/TRAINING PROGRAM

## 2023-05-17 PROCEDURE — 3044F PR MOST RECENT HEMOGLOBIN A1C LEVEL <7.0%: ICD-10-PCS | Mod: CPTII,S$GLB,, | Performed by: STUDENT IN AN ORGANIZED HEALTH CARE EDUCATION/TRAINING PROGRAM

## 2023-05-17 PROCEDURE — 3080F DIAST BP >= 90 MM HG: CPT | Mod: CPTII,S$GLB,, | Performed by: STUDENT IN AN ORGANIZED HEALTH CARE EDUCATION/TRAINING PROGRAM

## 2023-05-17 PROCEDURE — 99999 PR PBB SHADOW E&M-EST. PATIENT-LVL V: ICD-10-PCS | Mod: PBBFAC,,, | Performed by: STUDENT IN AN ORGANIZED HEALTH CARE EDUCATION/TRAINING PROGRAM

## 2023-05-17 PROCEDURE — 3066F PR DOCUMENTATION OF TREATMENT FOR NEPHROPATHY: ICD-10-PCS | Mod: CPTII,S$GLB,, | Performed by: STUDENT IN AN ORGANIZED HEALTH CARE EDUCATION/TRAINING PROGRAM

## 2023-05-17 PROCEDURE — 3060F PR POS MICROALBUMINURIA RESULT DOCUMENTED/REVIEW: ICD-10-PCS | Mod: CPTII,S$GLB,, | Performed by: STUDENT IN AN ORGANIZED HEALTH CARE EDUCATION/TRAINING PROGRAM

## 2023-05-17 PROCEDURE — 99213 PR OFFICE/OUTPT VISIT, EST, LEVL III, 20-29 MIN: ICD-10-PCS | Mod: S$GLB,,, | Performed by: STUDENT IN AN ORGANIZED HEALTH CARE EDUCATION/TRAINING PROGRAM

## 2023-05-17 PROCEDURE — 3008F PR BODY MASS INDEX (BMI) DOCUMENTED: ICD-10-PCS | Mod: CPTII,S$GLB,, | Performed by: STUDENT IN AN ORGANIZED HEALTH CARE EDUCATION/TRAINING PROGRAM

## 2023-05-17 PROCEDURE — 3060F POS MICROALBUMINURIA REV: CPT | Mod: CPTII,S$GLB,, | Performed by: STUDENT IN AN ORGANIZED HEALTH CARE EDUCATION/TRAINING PROGRAM

## 2023-05-17 PROCEDURE — 1159F PR MEDICATION LIST DOCUMENTED IN MEDICAL RECORD: ICD-10-PCS | Mod: CPTII,S$GLB,, | Performed by: STUDENT IN AN ORGANIZED HEALTH CARE EDUCATION/TRAINING PROGRAM

## 2023-05-17 PROCEDURE — 3066F NEPHROPATHY DOC TX: CPT | Mod: CPTII,S$GLB,, | Performed by: STUDENT IN AN ORGANIZED HEALTH CARE EDUCATION/TRAINING PROGRAM

## 2023-05-17 PROCEDURE — 99213 OFFICE O/P EST LOW 20 MIN: CPT | Mod: S$GLB,,, | Performed by: STUDENT IN AN ORGANIZED HEALTH CARE EDUCATION/TRAINING PROGRAM

## 2023-05-17 RX ORDER — HYDROCHLOROTHIAZIDE 12.5 MG/1
12.5 TABLET ORAL DAILY
Qty: 30 TABLET | Refills: 11 | Status: SHIPPED | OUTPATIENT
Start: 2023-05-17 | End: 2023-08-17 | Stop reason: SDUPTHER

## 2023-05-17 NOTE — ASSESSMENT & PLAN NOTE
Improving but not at goal. Discussed lifestyle changes, including DASH diet and exercise. Patient is at normal BMI, but if weight loss desired, she could increase her dose of Trulicity - will defer to Dr. Fields. Add HCTZ. Will get lab work one week after starting medication. Follow up with nurse in one month for BP check.

## 2023-05-17 NOTE — PROGRESS NOTES
Name: Kylee Burgos  MRN: [id number]  : 1970  PCP: Theresa Crane MD    HPI  Patient is here to establish care. Primary concern is blood pressure. Recently saw Ms Caceres at [facility]. She increased lisinopril to 40mg daily. No side effects at this time. She has only ever been on lisinopril.     Review of Systems   HENT:  Negative for hearing loss.    Eyes:  Negative for visual disturbance (chronic vision changes, wears glasses).   Respiratory:  Negative for cough and shortness of breath.    Cardiovascular:  Negative for chest pain.   Gastrointestinal:  Negative for nausea and vomiting.   Neurological:  Negative for headaches.     Patient Active Problem List   Diagnosis    Multiple joint pain    Hand numbness    Vitamin D deficiency disease    Fibromyalgia    Anxiety    Essential hypertension    Type 2 diabetes mellitus with diabetic neuropathy, without long-term current use of insulin    Mixed hyperlipidemia       Vitals:    23 1300   BP: (!) 152/96   Pulse: 98       Physical Exam  Constitutional:       General: She is not in acute distress.     Appearance: Normal appearance. She is well-developed.   HENT:      Head: Normocephalic and atraumatic.      Right Ear: External ear normal.      Left Ear: External ear normal.   Eyes:      Conjunctiva/sclera: Conjunctivae normal.   Cardiovascular:      Rate and Rhythm: Normal rate and regular rhythm.      Heart sounds: No murmur heard.    No friction rub. No gallop.   Pulmonary:      Effort: Pulmonary effort is normal. No respiratory distress.      Breath sounds: No wheezing, rhonchi or rales.   Abdominal:      General: Abdomen is flat. There is no distension.   Musculoskeletal:         General: No swelling or deformity.      Right lower leg: No edema.      Left lower leg: No edema.   Skin:     General: Skin is warm and dry.      Coloration: Skin is not jaundiced.   Neurological:      Mental Status: She is alert and oriented to person, place, and time.  Mental status is at baseline.   Psychiatric:         Attention and Perception: Attention and perception normal.         Mood and Affect: Mood normal.         Speech: Speech normal.         Behavior: Behavior normal. Behavior is cooperative.         Thought Content: Thought content normal.         Cognition and Memory: Cognition normal.         Judgment: Judgment normal.       1. Essential hypertension  Assessment & Plan:  Improving but not at goal. Discussed lifestyle changes, including DASH diet and exercise. Patient is at normal BMI, but if weight loss desired, she could increase her dose of Trulicity - will defer to Dr. Fields. Add HCTZ. Will get lab work one week after starting medication. Follow up with nurse in one month for BP check.     Orders:  -     hydroCHLOROthiazide (HYDRODIURIL) 12.5 MG Tab; Take 1 tablet (12.5 mg total) by mouth once daily.  Dispense: 30 tablet; Refill: 11  -     BASIC METABOLIC PANEL; Future; Expected date: 05/24/2023    2. Fibromyalgia  Assessment & Plan:  Patient is not sure that she has fibromyalgia - mostly has neck pain she believes related to ergonomics at her work. Given instructions on weaning duloxetine - currently on 30mg daily but she can decrease that to 30mg every other day for another four weeks before stopping entirely.          Follow up in 3 months    Hector Gallardo MD  05/17/2023

## 2023-05-17 NOTE — ASSESSMENT & PLAN NOTE
Patient is not sure that she has fibromyalgia - mostly has neck pain she believes related to ergonomics at her work. Given instructions on weaning duloxetine - currently on 30mg daily but she can decrease that to 30mg every other day for another four weeks before stopping entirely.

## 2023-05-24 ENCOUNTER — LAB VISIT (OUTPATIENT)
Dept: LAB | Facility: HOSPITAL | Age: 53
End: 2023-05-24
Attending: STUDENT IN AN ORGANIZED HEALTH CARE EDUCATION/TRAINING PROGRAM
Payer: COMMERCIAL

## 2023-05-24 DIAGNOSIS — I10 ESSENTIAL HYPERTENSION: ICD-10-CM

## 2023-05-24 LAB
ANION GAP SERPL CALC-SCNC: 12 MMOL/L (ref 8–16)
BUN SERPL-MCNC: 15 MG/DL (ref 6–20)
CALCIUM SERPL-MCNC: 9.9 MG/DL (ref 8.7–10.5)
CHLORIDE SERPL-SCNC: 103 MMOL/L (ref 95–110)
CO2 SERPL-SCNC: 26 MMOL/L (ref 23–29)
CREAT SERPL-MCNC: 0.7 MG/DL (ref 0.5–1.4)
EST. GFR  (NO RACE VARIABLE): >60 ML/MIN/1.73 M^2
GLUCOSE SERPL-MCNC: 95 MG/DL (ref 70–110)
POTASSIUM SERPL-SCNC: 4 MMOL/L (ref 3.5–5.1)
SODIUM SERPL-SCNC: 141 MMOL/L (ref 136–145)

## 2023-05-24 PROCEDURE — 80048 BASIC METABOLIC PNL TOTAL CA: CPT | Performed by: STUDENT IN AN ORGANIZED HEALTH CARE EDUCATION/TRAINING PROGRAM

## 2023-05-24 PROCEDURE — 36415 COLL VENOUS BLD VENIPUNCTURE: CPT | Mod: PN | Performed by: STUDENT IN AN ORGANIZED HEALTH CARE EDUCATION/TRAINING PROGRAM

## 2023-05-25 ENCOUNTER — PATIENT MESSAGE (OUTPATIENT)
Dept: ADMINISTRATIVE | Facility: HOSPITAL | Age: 53
End: 2023-05-25
Payer: COMMERCIAL

## 2023-06-28 DIAGNOSIS — E55.9 VITAMIN D DEFICIENCY DISEASE: ICD-10-CM

## 2023-06-28 RX ORDER — ERGOCALCIFEROL 1.25 MG/1
50000 CAPSULE ORAL
Qty: 12 CAPSULE | Refills: 1 | Status: SHIPPED | OUTPATIENT
Start: 2023-06-28 | End: 2023-12-13

## 2023-07-07 ENCOUNTER — LAB VISIT (OUTPATIENT)
Dept: LAB | Facility: HOSPITAL | Age: 53
End: 2023-07-07
Attending: NURSE PRACTITIONER
Payer: COMMERCIAL

## 2023-07-07 DIAGNOSIS — E55.9 VITAMIN D DEFICIENCY DISEASE: ICD-10-CM

## 2023-07-07 DIAGNOSIS — E11.40 TYPE 2 DIABETES MELLITUS WITH DIABETIC NEUROPATHY, WITHOUT LONG-TERM CURRENT USE OF INSULIN: ICD-10-CM

## 2023-07-07 LAB
25(OH)D3+25(OH)D2 SERPL-MCNC: 31 NG/ML (ref 30–96)
ANION GAP SERPL CALC-SCNC: 12 MMOL/L (ref 8–16)
BUN SERPL-MCNC: 13 MG/DL (ref 6–20)
CALCIUM SERPL-MCNC: 9.9 MG/DL (ref 8.7–10.5)
CHLORIDE SERPL-SCNC: 102 MMOL/L (ref 95–110)
CO2 SERPL-SCNC: 25 MMOL/L (ref 23–29)
CREAT SERPL-MCNC: 0.7 MG/DL (ref 0.5–1.4)
EST. GFR  (NO RACE VARIABLE): >60 ML/MIN/1.73 M^2
ESTIMATED AVG GLUCOSE: 123 MG/DL (ref 68–131)
GLUCOSE SERPL-MCNC: 109 MG/DL (ref 70–110)
HBA1C MFR BLD: 5.9 % (ref 4–5.6)
POTASSIUM SERPL-SCNC: 4 MMOL/L (ref 3.5–5.1)
SODIUM SERPL-SCNC: 139 MMOL/L (ref 136–145)
TSH SERPL DL<=0.005 MIU/L-ACNC: 1.41 UIU/ML (ref 0.4–4)
VIT B12 SERPL-MCNC: 308 PG/ML (ref 210–950)

## 2023-07-07 PROCEDURE — 82306 VITAMIN D 25 HYDROXY: CPT | Performed by: NURSE PRACTITIONER

## 2023-07-07 PROCEDURE — 84443 ASSAY THYROID STIM HORMONE: CPT | Performed by: NURSE PRACTITIONER

## 2023-07-07 PROCEDURE — 36415 COLL VENOUS BLD VENIPUNCTURE: CPT | Mod: PN | Performed by: NURSE PRACTITIONER

## 2023-07-07 PROCEDURE — 82607 VITAMIN B-12: CPT | Performed by: NURSE PRACTITIONER

## 2023-07-07 PROCEDURE — 83036 HEMOGLOBIN GLYCOSYLATED A1C: CPT | Performed by: NURSE PRACTITIONER

## 2023-07-07 PROCEDURE — 80048 BASIC METABOLIC PNL TOTAL CA: CPT | Performed by: NURSE PRACTITIONER

## 2023-07-11 ENCOUNTER — OFFICE VISIT (OUTPATIENT)
Dept: ENDOCRINOLOGY | Facility: CLINIC | Age: 53
End: 2023-07-11
Payer: COMMERCIAL

## 2023-07-11 DIAGNOSIS — E78.2 MIXED HYPERLIPIDEMIA: ICD-10-CM

## 2023-07-11 DIAGNOSIS — E55.9 VITAMIN D DEFICIENCY DISEASE: ICD-10-CM

## 2023-07-11 DIAGNOSIS — I10 ESSENTIAL HYPERTENSION: ICD-10-CM

## 2023-07-11 DIAGNOSIS — S90.511A ABRASION OF RIGHT ANKLE, INITIAL ENCOUNTER: Primary | ICD-10-CM

## 2023-07-11 DIAGNOSIS — E11.40 TYPE 2 DIABETES MELLITUS WITH DIABETIC NEUROPATHY, WITHOUT LONG-TERM CURRENT USE OF INSULIN: Primary | ICD-10-CM

## 2023-07-11 PROCEDURE — 1160F PR REVIEW ALL MEDS BY PRESCRIBER/CLIN PHARMACIST DOCUMENTED: ICD-10-PCS | Mod: CPTII,95,, | Performed by: NURSE PRACTITIONER

## 2023-07-11 PROCEDURE — 3060F POS MICROALBUMINURIA REV: CPT | Mod: CPTII,95,, | Performed by: NURSE PRACTITIONER

## 2023-07-11 PROCEDURE — 99213 PR OFFICE/OUTPT VISIT, EST, LEVL III, 20-29 MIN: ICD-10-PCS | Mod: 95,,, | Performed by: NURSE PRACTITIONER

## 2023-07-11 PROCEDURE — 4010F PR ACE/ARB THEARPY RXD/TAKEN: ICD-10-PCS | Mod: CPTII,95,, | Performed by: NURSE PRACTITIONER

## 2023-07-11 PROCEDURE — 3044F HG A1C LEVEL LT 7.0%: CPT | Mod: CPTII,95,, | Performed by: NURSE PRACTITIONER

## 2023-07-11 PROCEDURE — 4010F ACE/ARB THERAPY RXD/TAKEN: CPT | Mod: CPTII,95,, | Performed by: NURSE PRACTITIONER

## 2023-07-11 PROCEDURE — 3066F NEPHROPATHY DOC TX: CPT | Mod: CPTII,95,, | Performed by: NURSE PRACTITIONER

## 2023-07-11 PROCEDURE — 3066F PR DOCUMENTATION OF TREATMENT FOR NEPHROPATHY: ICD-10-PCS | Mod: CPTII,95,, | Performed by: NURSE PRACTITIONER

## 2023-07-11 PROCEDURE — 3060F PR POS MICROALBUMINURIA RESULT DOCUMENTED/REVIEW: ICD-10-PCS | Mod: CPTII,95,, | Performed by: NURSE PRACTITIONER

## 2023-07-11 PROCEDURE — 1160F RVW MEDS BY RX/DR IN RCRD: CPT | Mod: CPTII,95,, | Performed by: NURSE PRACTITIONER

## 2023-07-11 PROCEDURE — 1159F PR MEDICATION LIST DOCUMENTED IN MEDICAL RECORD: ICD-10-PCS | Mod: CPTII,95,, | Performed by: NURSE PRACTITIONER

## 2023-07-11 PROCEDURE — 99213 OFFICE O/P EST LOW 20 MIN: CPT | Mod: 95,,, | Performed by: NURSE PRACTITIONER

## 2023-07-11 PROCEDURE — 3044F PR MOST RECENT HEMOGLOBIN A1C LEVEL <7.0%: ICD-10-PCS | Mod: CPTII,95,, | Performed by: NURSE PRACTITIONER

## 2023-07-11 PROCEDURE — 1159F MED LIST DOCD IN RCRD: CPT | Mod: CPTII,95,, | Performed by: NURSE PRACTITIONER

## 2023-07-11 NOTE — PROGRESS NOTES
The patient location is:  [place]  The chief complaint leading to consultation is: Type 2 DM  Visit type: Virtual visit with synchronous audio and video  Total time spent with patient: 15 min  Each patient to whom he or she provides medical services by telemedicine is:  (1) informed of the relationship between the physician and patient and the respective role of any other health care provider with respect to management of the patient; and (2) notified that he or she may decline to receive medical services by telemedicine and may withdraw from such care at any time.       CC: Ms. Kylee Burgos arrives today for management of Type 2 DM and review of chronic medical conditions, as listed in the Visit Diagnosis section of this encounter.       HPI: Ms. Kylee Burgos was diagnosed with Type 2 DM in 8/2017, after having recurrent yeast infections. A1c > 14% upon workup and she went to ER at time of diagnosis for glucose >600. C-peptide and DESMOND ab were normal. Initial treatment consisted of insulin and metformin. Jardiance and Trulicity were added soon after. She lost 25 lbs in 2017 and was able to discontinue insulin. Trulicity was later added but discontinued in 2/2019, due to A1c of 5.6% on triple therapy. + FH of DM in maternal GF. Denies hospitalizations due to DM.     Last seen by me in January.    She broke R ankle on Sunday. Awaiting to be scheduled with Dr. Grant. Ankle is currently in splint.     BG readings are checked once daily, usually fasting. Reports readings are mostly < 130.     Hypoglycemia: No    Missing Insulin/PO medication doses: No    Exercise: No.     Dietary Habits: Eats 2 meals/day. Usually skips breakfast. Rare snacking. Avoids sugary beverages.     Regarding Vit D deficiency, she is compliant with ergocalciferol.      CURRENT DIABETIC MEDS: metformin XR 1000 mg BID, Trulicity 0.75 mg weekly  Glucometer type: One Touch Verio    Previous DM treatments:  Juan Franciscouvia - not  effective  Metformin - diarrhea  Farxiga - elevated glucoses when compared to Jardiance   Trulicity  Jardiance - cost  Steglatro - recurrent UTI  Glimepiride     Last Eye Exam: 1/2023, no DR  Last Podiatry Exam: 2018 for ingrown toe nails     REVIEW OF SYSTEMS  Constitutional: no c/o weakness, weight loss.   Cardiac: no palpitations or chest pain.  Respiratory: no cough or dyspnea.  GI: no c/o nausea, abdominal pain. Denies h/o pancreatitis.   Skin: no lesions or rashes.   Musculoskeletal: + R ankle fracture - in splint  Neuro: denies numbness, tingling, paresthesias  Endocrine: denies polyphagia, polydipsia, polyuria.      Personally reviewed Past Medical, Surgical, Social History.    Vital Signs  There were no vitals taken for this visit. -- video visit    Personally reviewed the below labs:    Hemoglobin A1C   Date Value Ref Range Status   07/07/2023 5.9 (H) 4.0 - 5.6 % Final     Comment:     ADA Screening Guidelines:  5.7-6.4%  Consistent with prediabetes  >or=6.5%  Consistent with diabetes    High levels of fetal hemoglobin interfere with the HbA1C  assay. Heterozygous hemoglobin variants (HbS, HgC, etc)do  not significantly interfere with this assay.   However, presence of multiple variants may affect accuracy.     01/10/2023 5.8 (H) 4.0 - 5.6 % Final     Comment:     ADA Screening Guidelines:  5.7-6.4%  Consistent with prediabetes  >or=6.5%  Consistent with diabetes    High levels of fetal hemoglobin interfere with the HbA1C  assay. Heterozygous hemoglobin variants (HbS, HgC, etc)do  not significantly interfere with this assay.   However, presence of multiple variants may affect accuracy.     07/14/2022 5.7 (H) 4.0 - 5.6 % Final     Comment:     ADA Screening Guidelines:  5.7-6.4%  Consistent with prediabetes  >or=6.5%  Consistent with diabetes    High levels of fetal hemoglobin interfere with the HbA1C  assay. Heterozygous hemoglobin variants (HbS, HgC, etc)do  not significantly interfere with this assay.    However, presence of multiple variants may affect accuracy.         Chemistry        Component Value Date/Time     07/07/2023 0813    K 4.0 07/07/2023 0813     07/07/2023 0813    CO2 25 07/07/2023 0813    BUN 13 07/07/2023 0813    CREATININE 0.7 07/07/2023 0813     07/07/2023 0813        Component Value Date/Time    CALCIUM 9.9 07/07/2023 0813    ALKPHOS 107 02/01/2023 1056    AST 29 02/01/2023 1056    ALT 35 02/01/2023 1056    BILITOT 0.5 02/01/2023 1056    ESTGFRAFRICA >60.0 03/15/2022 0748    EGFRNONAA >60.0 03/15/2022 0748          Lab Results   Component Value Date    CHOL 143 01/10/2023    CHOL 168 03/15/2022    CHOL 186 07/07/2020     Lab Results   Component Value Date    HDL 45 01/10/2023    HDL 57 03/15/2022    HDL 61 07/07/2020     Lab Results   Component Value Date    LDLCALC 75.2 01/10/2023    LDLCALC 78.6 03/15/2022    LDLCALC 96.8 07/07/2020     Lab Results   Component Value Date    TRIG 114 01/10/2023    TRIG 162 (H) 03/15/2022    TRIG 141 07/07/2020     Lab Results   Component Value Date    CHOLHDL 31.5 01/10/2023    CHOLHDL 33.9 03/15/2022    CHOLHDL 32.8 07/07/2020       Lab Results   Component Value Date    MICALBCREAT 123.1 (H) 01/10/2023     Lab Results   Component Value Date    TSH 1.410 07/07/2023       CrCl cannot be calculated (Unknown ideal weight.).    Vit D, 25-Hydroxy   Date Value Ref Range Status   07/07/2023 31 30 - 96 ng/mL Final     Comment:     Vitamin D deficiency.........<10 ng/mL                              Vitamin D insufficiency......10-29 ng/mL       Vitamin D sufficiency........> or equal to 30 ng/mL  Vitamin D toxicity............>100 ng/mL         PHYSICAL EXAMINATION  Deferred -- video visit        A1c target < 7%      Assessment/Plan  1. Type 2 diabetes mellitus with neuropathy, without long term insulin use status  -- A1c at goal. Tolerating medications well. She is open to transitioning to the [facility] program and understands that  she can follow up with me if condition worsens in the future. I will notify her PCP.   -- continue Trulicity, metformin XR  -- check BG 1x/day, alternating times    -- Discussed diagnosis of DM, A1c goals, progression of disease, long term complications and tx options.    2. Essential hypertension  -- continue lisinopril    3. Mixed hyperlipidemia  -- controlled.   -- continue pravastatin (couldn't tolerate atorvastatin)   4. Vitamin D deficiency -- improving  -- continue ergo once weekly         FOLLOW UP  Follow up if symptoms worsen or fail to improve.   Patient instructed to bring BG logs to each follow up   Patient encouraged to call for any BG/medication issues, concerns, or questions.    No orders of the defined types were placed in this encounter.

## 2023-07-13 ENCOUNTER — OFFICE VISIT (OUTPATIENT)
Dept: ORTHOPEDICS | Facility: CLINIC | Age: 53
End: 2023-07-13
Payer: COMMERCIAL

## 2023-07-13 ENCOUNTER — HOSPITAL ENCOUNTER (OUTPATIENT)
Dept: RADIOLOGY | Facility: HOSPITAL | Age: 53
Discharge: HOME | End: 2023-07-13
Attending: ORTHOPAEDIC SURGERY
Payer: COMMERCIAL

## 2023-07-13 DIAGNOSIS — S82.61XA DISPLACED FRACTURE OF LATERAL MALLEOLUS OF RIGHT FIBULA, INITIAL ENCOUNTER FOR CLOSED FRACTURE: Primary | ICD-10-CM

## 2023-07-13 DIAGNOSIS — S90.511A ABRASION OF RIGHT ANKLE, INITIAL ENCOUNTER: ICD-10-CM

## 2023-07-13 PROCEDURE — 99204 PR OFFICE/OUTPT VISIT, NEW, LEVL IV, 45-59 MIN: ICD-10-PCS | Mod: S$GLB,,, | Performed by: ORTHOPAEDIC SURGERY

## 2023-07-13 PROCEDURE — 1160F PR REVIEW ALL MEDS BY PRESCRIBER/CLIN PHARMACIST DOCUMENTED: ICD-10-PCS | Mod: CPTII,S$GLB,, | Performed by: ORTHOPAEDIC SURGERY

## 2023-07-13 PROCEDURE — 3060F PR POS MICROALBUMINURIA RESULT DOCUMENTED/REVIEW: ICD-10-PCS | Mod: CPTII,S$GLB,, | Performed by: ORTHOPAEDIC SURGERY

## 2023-07-13 PROCEDURE — 73630 X-RAY EXAM OF FOOT: CPT | Mod: 26,RT,, | Performed by: RADIOLOGY

## 2023-07-13 PROCEDURE — 73630 X-RAY EXAM OF FOOT: CPT | Mod: TC,PO,RT

## 2023-07-13 PROCEDURE — 4010F PR ACE/ARB THEARPY RXD/TAKEN: ICD-10-PCS | Mod: CPTII,S$GLB,, | Performed by: ORTHOPAEDIC SURGERY

## 2023-07-13 PROCEDURE — 3066F NEPHROPATHY DOC TX: CPT | Mod: CPTII,S$GLB,, | Performed by: ORTHOPAEDIC SURGERY

## 2023-07-13 PROCEDURE — 3044F PR MOST RECENT HEMOGLOBIN A1C LEVEL <7.0%: ICD-10-PCS | Mod: CPTII,S$GLB,, | Performed by: ORTHOPAEDIC SURGERY

## 2023-07-13 PROCEDURE — 1159F MED LIST DOCD IN RCRD: CPT | Mod: CPTII,S$GLB,, | Performed by: ORTHOPAEDIC SURGERY

## 2023-07-13 PROCEDURE — 1160F RVW MEDS BY RX/DR IN RCRD: CPT | Mod: CPTII,S$GLB,, | Performed by: ORTHOPAEDIC SURGERY

## 2023-07-13 PROCEDURE — 73630 XR FOOT COMPLETE 3 VIEW RIGHT: ICD-10-PCS | Mod: 26,RT,, | Performed by: RADIOLOGY

## 2023-07-13 PROCEDURE — 3044F HG A1C LEVEL LT 7.0%: CPT | Mod: CPTII,S$GLB,, | Performed by: ORTHOPAEDIC SURGERY

## 2023-07-13 PROCEDURE — 3060F POS MICROALBUMINURIA REV: CPT | Mod: CPTII,S$GLB,, | Performed by: ORTHOPAEDIC SURGERY

## 2023-07-13 PROCEDURE — 1159F PR MEDICATION LIST DOCUMENTED IN MEDICAL RECORD: ICD-10-PCS | Mod: CPTII,S$GLB,, | Performed by: ORTHOPAEDIC SURGERY

## 2023-07-13 PROCEDURE — 3066F PR DOCUMENTATION OF TREATMENT FOR NEPHROPATHY: ICD-10-PCS | Mod: CPTII,S$GLB,, | Performed by: ORTHOPAEDIC SURGERY

## 2023-07-13 PROCEDURE — 73610 X-RAY EXAM OF ANKLE: CPT | Mod: TC,PO,RT

## 2023-07-13 PROCEDURE — 73610 XR ANKLE COMPLETE 3 VIEW RIGHT: ICD-10-PCS | Mod: 26,RT,, | Performed by: RADIOLOGY

## 2023-07-13 PROCEDURE — 99204 OFFICE O/P NEW MOD 45 MIN: CPT | Mod: S$GLB,,, | Performed by: ORTHOPAEDIC SURGERY

## 2023-07-13 PROCEDURE — 99999 PR PBB SHADOW E&M-EST. PATIENT-LVL III: ICD-10-PCS | Mod: PBBFAC,,, | Performed by: ORTHOPAEDIC SURGERY

## 2023-07-13 PROCEDURE — 99999 PR PBB SHADOW E&M-EST. PATIENT-LVL III: CPT | Mod: PBBFAC,,, | Performed by: ORTHOPAEDIC SURGERY

## 2023-07-13 PROCEDURE — 73610 X-RAY EXAM OF ANKLE: CPT | Mod: 26,RT,, | Performed by: RADIOLOGY

## 2023-07-13 PROCEDURE — 4010F ACE/ARB THERAPY RXD/TAKEN: CPT | Mod: CPTII,S$GLB,, | Performed by: ORTHOPAEDIC SURGERY

## 2023-07-13 NOTE — PROGRESS NOTES
"Status/Diagnosis: Minimally displaced Right lateral malleolus fracture, Quigley B.  Date of Surgery: none  Date of Injury: 2023  Return visit: 2 weeks  X-rays on Return: Single leg stance WB 3-views Right ankle    Chief Complaint:   Chief Complaint   Patient presents with    Right Ankle - Pain, Injury     Present History:  Kylee Burgos is a 52 y.o. female who presents today via referral from Dr. Vince Grant.  She presents today for new patient evaluation.  States that she misstepped and fell off of a curb on .  Immediate right ankle pain, swelling, difficulty bearing weight.  Seen at local ED at which time x-rays were taken and consistent with fracture.  She was placed into a short-leg splint given crutches with instructions for outpatient orthopedic follow-up.    On further questioning patient reports that she had a right ankle fracture 5 years ago that was treated nonoperatively.  She recovered well.  No recent issues up until said injury on .    Currently taking NSAIDs as needed for pain.  She has had some intermittent numbness and paresthesias only since time of injury.      Past medical history significant for type 2 diabetes, A1c of 5.9; and chronic tobacco use.  Patient smokes 1+ pack per day for the last 20+ years.  Works from home.      Past Medical History:   Diagnosis Date    Scleroderma        Past Surgical History:   Procedure Laterality Date     SECTION      HYSTERECTOMY         Current Outpatient Medications   Medication Sig    BD INSULIN PEN NEEDLE UF MINI 31 gauge x 3/16" Ndle     blood sugar diagnostic Strp To check BG 1 time daily, to use with insurance preferred meter    dulaglutide (TRULICITY) 0.75 mg/0.5 mL pen injector Inject 0.75 mg into the skin every 7 days.    DULoxetine (CYMBALTA) 60 MG capsule Take 1 capsule (60 mg total) by mouth once daily. (Patient taking differently: Take 30 mg by mouth once daily.)    ergocalciferol (ERGOCALCIFEROL) 50,000 unit Cap " Take 1 capsule (50,000 Units total) by mouth every 7 days.    hydroCHLOROthiazide (HYDRODIURIL) 12.5 MG Tab Take 1 tablet (12.5 mg total) by mouth once daily.    HYDROcodone-acetaminophen (NORCO) 5-325 mg per tablet Take 1 tablet by mouth every 6 (six) hours as needed for Pain.    lancets 33 gauge Misc 1 lancet by Misc.(Non-Drug; Combo Route) route 4 (four) times daily.    lancets Misc To check BG 1 time daily, to use with insurance preferred meter    lisinopriL (PRINIVIL,ZESTRIL) 40 MG tablet Take 1 tablet (40 mg total) by mouth once daily.    metFORMIN (GLUCOPHAGE-XR) 500 MG ER 24hr tablet TAKE 2 TABLETS BY MOUTH TWICE A DAY WITH MEALS    naproxen (NAPROSYN) 500 MG tablet Take 1 tablet (500 mg total) by mouth 2 (two) times daily with meals.    ondansetron (ZOFRAN-ODT) 8 MG TbDL Take 1 tablet (8 mg total) by mouth every 8 (eight) hours as needed (for nausea).    ONETOUCH VERIO TEST STRIPS Strp USE TO TEST ONCE DAILY    pravastatin (PRAVACHOL) 20 MG tablet TAKE ONE TABLET BY MOUTH EVERY DAY IN THE EVENING    blood-glucose meter kit To check BG 1 time daily, to use with insurance preferred meter     No current facility-administered medications for this visit.       Review of patient's allergies indicates:  No Known Allergies    Family History   Problem Relation Age of Onset    Heart disease Mother     Hypertension Mother     Lupus Paternal Aunt     Glaucoma Neg Hx     Macular degeneration Neg Hx        Social History     Socioeconomic History    Marital status:    Tobacco Use    Smoking status: Every Day     Packs/day: 0.50     Years: 36.00     Pack years: 18.00     Types: Cigarettes     Start date: 1987    Smokeless tobacco: Never   Substance and Sexual Activity    Alcohol use: Yes     Comment: socially    Drug use: No    Sexual activity: Yes     Partners: Male     Social Determinants of Health     Financial Resource Strain: Low Risk     Difficulty of Paying Living Expenses: Not very hard   Food Insecurity:  No Food Insecurity    Worried About Running Out of Food in the Last Year: Never true    Ran Out of Food in the Last Year: Never true   Transportation Needs: No Transportation Needs    Lack of Transportation (Medical): No    Lack of Transportation (Non-Medical): No   Physical Activity: Unknown    Days of Exercise per Week: Patient refused   Stress: Stress Concern Present    Feeling of Stress : To some extent   Social Connections: Unknown    Frequency of Communication with Friends and Family: More than three times a week    Frequency of Social Gatherings with Friends and Family: Twice a week    Active Member of Clubs or Organizations: Patient refused    Attends Club or Organization Meetings: Patient refused    Marital Status:    Housing Stability: High Risk    Unable to Pay for Housing in the Last Year: No    Unstable Housing in the Last Year: Yes       Physical exam:  There were no vitals filed for this visit.  There is no height or weight on file to calculate BMI.  General: In no apparent distress; well developed and well nourished.  HEENT: normocephalic; atraumatic.  Cardiovascular: regular rate.  Respiratory: no increased work of breathing.  Musculoskeletal:   Gait: able to bear full weight standing but antalgic  Inspection:   Moderate to significant residual diffuse swelling and ecchymosis involving the lateral aspect of the ankle with distal extension of the hindfoot.  Also with moderate swelling and ecchymosis involving the dorsal forefoot most prominent at the 2nd and 3rd MTP joints.   No medial based ankle swelling or tenderness at the deltoid.  Deferred anterior drawer testing secondary to pain.  Significant tenderness at the lateral malleolar fracture site.  Also with moderate tenderness over the 2nd and 3rd MTJoints over the area of increased swelling and ecchymosis.  Sil[place]:  Deferred  Alignment:  Knee: neutral               Ankle: neutral              Hindfoot: neutral               Forefoot: neutral   Strength:              Dorsiflexion 5/5  Plantar flexion 5/5  Inversion 5/5   Eversion 5/5   Sensation:             Good sensation on monofilament testing.  ROM:              Ankle and subtalar:  Limited secondary to pain.  Pulses: 2+ DP/PT pulses.                   Imaging Studies/Outside documentation:  I have ordered/reviewed/interpreted the following images/outside documentation:  1. Weight bearing 3-views of Right foot and ankle:   On my independent review, acute minimally displaced short spiral fracture of the lateral malleolus at the level of the syndesmosis, Quigley B. a proximally 2 mm of shortening.  Ankle mortise remains congruent with weight-bearing stress x-rays.  Also with evidence 2nd and 3rd metatarsal stress reaction versus stress fracture involving the mid diaphysis, now well healed with thickening of the cortices.  No significant degenerative joint changes.          Assessment:  Kylee Burgos is a 52 y.o. female with Minimally displaced Right lateral malleolus fracture, Quigley B.     Plan:   Clinical and radiographic findings were discussed.  Operative versus nonoperative treatment options were described.  Patient with stable appearing Quigley B right ankle fracture on weight-bearing stress x-rays today.  Patient with multiple medical comorbidities as noted in HPI.  Recommend conservative management to include placing the patient into a tall boot.  She was to remain strict nonweightbearing right lower extremity.  Boot to be worn at all times except for sleep, hygiene, home ankle range of motion exercises.  Discussed the importance of rest, ice, compression, elevation.  Over-the-counter oral Tylenol as needed for pain.  Patient and family voiced understanding.  All questions were answered.  Return to clinic in 2 weeks for repeat evaluation and x-ray.    We performed a custom orthotic/brace fitting, adjusting and training with the patient. The patient demonstrated understanding  and proper care. This was performed for 15 minutes.      This note was created using voice recognition software and may contain grammatical errors.

## 2023-07-18 ENCOUNTER — PATIENT MESSAGE (OUTPATIENT)
Dept: FAMILY MEDICINE | Facility: CLINIC | Age: 53
End: 2023-07-18
Payer: COMMERCIAL

## 2023-07-18 DIAGNOSIS — E78.2 MIXED HYPERLIPIDEMIA: ICD-10-CM

## 2023-07-18 DIAGNOSIS — E11.40 TYPE 2 DIABETES MELLITUS WITH DIABETIC NEUROPATHY, WITHOUT LONG-TERM CURRENT USE OF INSULIN: ICD-10-CM

## 2023-07-18 RX ORDER — PRAVASTATIN SODIUM 20 MG/1
20 TABLET ORAL NIGHTLY
Qty: 90 TABLET | Refills: 3 | Status: SHIPPED | OUTPATIENT
Start: 2023-07-18

## 2023-07-18 RX ORDER — METFORMIN HYDROCHLORIDE 500 MG/1
1000 TABLET, EXTENDED RELEASE ORAL 2 TIMES DAILY WITH MEALS
Qty: 360 TABLET | Refills: 3 | Status: SHIPPED | OUTPATIENT
Start: 2023-07-18

## 2023-07-21 DIAGNOSIS — S82.61XA DISPLACED FRACTURE OF LATERAL MALLEOLUS OF RIGHT FIBULA, INITIAL ENCOUNTER FOR CLOSED FRACTURE: Primary | ICD-10-CM

## 2023-07-27 ENCOUNTER — HOSPITAL ENCOUNTER (OUTPATIENT)
Dept: RADIOLOGY | Facility: HOSPITAL | Age: 53
Discharge: HOME | End: 2023-07-27
Attending: ORTHOPAEDIC SURGERY
Payer: COMMERCIAL

## 2023-07-27 ENCOUNTER — OFFICE VISIT (OUTPATIENT)
Dept: ORTHOPEDICS | Facility: CLINIC | Age: 53
End: 2023-07-27
Payer: COMMERCIAL

## 2023-07-27 ENCOUNTER — PATIENT MESSAGE (OUTPATIENT)
Dept: FAMILY MEDICINE | Facility: CLINIC | Age: 53
End: 2023-07-27
Payer: COMMERCIAL

## 2023-07-27 DIAGNOSIS — S82.61XA DISPLACED FRACTURE OF LATERAL MALLEOLUS OF RIGHT FIBULA, INITIAL ENCOUNTER FOR CLOSED FRACTURE: ICD-10-CM

## 2023-07-27 DIAGNOSIS — R11.0 NAUSEA: ICD-10-CM

## 2023-07-27 DIAGNOSIS — S82.61XA DISPLACED FRACTURE OF LATERAL MALLEOLUS OF RIGHT FIBULA, INITIAL ENCOUNTER FOR CLOSED FRACTURE: Primary | ICD-10-CM

## 2023-07-27 PROCEDURE — 3066F PR DOCUMENTATION OF TREATMENT FOR NEPHROPATHY: ICD-10-PCS | Mod: CPTII,S$GLB,, | Performed by: ORTHOPAEDIC SURGERY

## 2023-07-27 PROCEDURE — 3060F PR POS MICROALBUMINURIA RESULT DOCUMENTED/REVIEW: ICD-10-PCS | Mod: CPTII,S$GLB,, | Performed by: ORTHOPAEDIC SURGERY

## 2023-07-27 PROCEDURE — 4010F PR ACE/ARB THEARPY RXD/TAKEN: ICD-10-PCS | Mod: CPTII,S$GLB,, | Performed by: ORTHOPAEDIC SURGERY

## 2023-07-27 PROCEDURE — 1159F MED LIST DOCD IN RCRD: CPT | Mod: CPTII,S$GLB,, | Performed by: ORTHOPAEDIC SURGERY

## 2023-07-27 PROCEDURE — 4010F ACE/ARB THERAPY RXD/TAKEN: CPT | Mod: CPTII,S$GLB,, | Performed by: ORTHOPAEDIC SURGERY

## 2023-07-27 PROCEDURE — 3044F PR MOST RECENT HEMOGLOBIN A1C LEVEL <7.0%: ICD-10-PCS | Mod: CPTII,S$GLB,, | Performed by: ORTHOPAEDIC SURGERY

## 2023-07-27 PROCEDURE — 73610 X-RAY EXAM OF ANKLE: CPT | Mod: TC,PO,RT

## 2023-07-27 PROCEDURE — 99213 PR OFFICE/OUTPT VISIT, EST, LEVL III, 20-29 MIN: ICD-10-PCS | Mod: S$GLB,,, | Performed by: ORTHOPAEDIC SURGERY

## 2023-07-27 PROCEDURE — 1160F RVW MEDS BY RX/DR IN RCRD: CPT | Mod: CPTII,S$GLB,, | Performed by: ORTHOPAEDIC SURGERY

## 2023-07-27 PROCEDURE — 99999 PR PBB SHADOW E&M-EST. PATIENT-LVL III: CPT | Mod: PBBFAC,,, | Performed by: ORTHOPAEDIC SURGERY

## 2023-07-27 PROCEDURE — 73610 X-RAY EXAM OF ANKLE: CPT | Mod: 26,RT,, | Performed by: RADIOLOGY

## 2023-07-27 PROCEDURE — 73610 XR ANKLE COMPLETE 3 VIEW RIGHT: ICD-10-PCS | Mod: 26,RT,, | Performed by: RADIOLOGY

## 2023-07-27 PROCEDURE — 3066F NEPHROPATHY DOC TX: CPT | Mod: CPTII,S$GLB,, | Performed by: ORTHOPAEDIC SURGERY

## 2023-07-27 PROCEDURE — 3060F POS MICROALBUMINURIA REV: CPT | Mod: CPTII,S$GLB,, | Performed by: ORTHOPAEDIC SURGERY

## 2023-07-27 PROCEDURE — 3044F HG A1C LEVEL LT 7.0%: CPT | Mod: CPTII,S$GLB,, | Performed by: ORTHOPAEDIC SURGERY

## 2023-07-27 PROCEDURE — 99213 OFFICE O/P EST LOW 20 MIN: CPT | Mod: S$GLB,,, | Performed by: ORTHOPAEDIC SURGERY

## 2023-07-27 PROCEDURE — 1159F PR MEDICATION LIST DOCUMENTED IN MEDICAL RECORD: ICD-10-PCS | Mod: CPTII,S$GLB,, | Performed by: ORTHOPAEDIC SURGERY

## 2023-07-27 PROCEDURE — 1160F PR REVIEW ALL MEDS BY PRESCRIBER/CLIN PHARMACIST DOCUMENTED: ICD-10-PCS | Mod: CPTII,S$GLB,, | Performed by: ORTHOPAEDIC SURGERY

## 2023-07-27 PROCEDURE — 99999 PR PBB SHADOW E&M-EST. PATIENT-LVL III: ICD-10-PCS | Mod: PBBFAC,,, | Performed by: ORTHOPAEDIC SURGERY

## 2023-07-27 RX ORDER — ONDANSETRON 8 MG/1
8 TABLET, ORALLY DISINTEGRATING ORAL EVERY 8 HOURS PRN
Qty: 20 TABLET | Refills: 1 | Status: SHIPPED | OUTPATIENT
Start: 2023-07-27

## 2023-07-27 NOTE — TELEPHONE ENCOUNTER
No care due was identified.  [facility] Embedded Care Due Messages. Reference number: [id number].   7/27/2023 3:01:38 PM CDT

## 2023-07-27 NOTE — PROGRESS NOTES
"Status/Diagnosis: Minimally displaced Right lateral malleolus fracture, Quigley B.  Date of Surgery: none  Date of Injury: 2023  Return visit:1 month  X-rays on Return: Single leg stance WB 3-views Right ankle    Chief Complaint:   Chief Complaint   Patient presents with    Right Ankle - Pain     Present History:  Kylee Burgos is a 52 y.o. female who returns today for repeat clinical evaluation.  Overall doing well.  Endorses 0/10 pain.  Has been putting some weight on the affected extremity in the boot using crutches for ambulation.  States that for the most part she is been trying to use a wheelchair for day-to-day ambulation.  No new injuries.  Denies any numbness or tingling.    Past medical history significant for type 2 diabetes, A1c of 5.9; and chronic tobacco use.  Patient smokes 1+ pack per day for the last 20+ years.  Works from home.      Past Medical History:   Diagnosis Date    Scleroderma        Past Surgical History:   Procedure Laterality Date     SECTION      HYSTERECTOMY         Current Outpatient Medications   Medication Sig    BD INSULIN PEN NEEDLE UF MINI 31 gauge x 3/16" Ndle     blood sugar diagnostic Strp To check BG 1 time daily, to use with insurance preferred meter    blood-glucose meter kit To check BG 1 time daily, to use with insurance preferred meter    dulaglutide (TRULICITY) 0.75 mg/0.5 mL pen injector Inject 0.75 mg into the skin every 7 days.    DULoxetine (CYMBALTA) 60 MG capsule Take 1 capsule (60 mg total) by mouth once daily. (Patient taking differently: Take 30 mg by mouth once daily.)    ergocalciferol (ERGOCALCIFEROL) 50,000 unit Cap Take 1 capsule (50,000 Units total) by mouth every 7 days.    hydroCHLOROthiazide (HYDRODIURIL) 12.5 MG Tab Take 1 tablet (12.5 mg total) by mouth once daily.    HYDROcodone-acetaminophen (NORCO) 5-325 mg per tablet Take 1 tablet by mouth every 6 (six) hours as needed for Pain.    lancets 33 gauge Misc 1 lancet by " Misc.(Non-Drug; Combo Route) route 4 (four) times daily.    lancets Misc To check BG 1 time daily, to use with insurance preferred meter    lisinopriL (PRINIVIL,ZESTRIL) 40 MG tablet Take 1 tablet (40 mg total) by mouth once daily.    metFORMIN (GLUCOPHAGE-XR) 500 MG ER 24hr tablet Take 2 tablets (1,000 mg total) by mouth 2 (two) times daily with meals.    naproxen (NAPROSYN) 500 MG tablet Take 1 tablet (500 mg total) by mouth 2 (two) times daily with meals.    ondansetron (ZOFRAN-ODT) 8 MG TbDL Take 1 tablet (8 mg total) by mouth every 8 (eight) hours as needed (for nausea).    ONETOUCH VERIO TEST STRIPS Strp USE TO TEST ONCE DAILY    pravastatin (PRAVACHOL) 20 MG tablet Take 1 tablet (20 mg total) by mouth every evening.     No current facility-administered medications for this visit.       Review of patient's allergies indicates:  No Known Allergies    Family History   Problem Relation Age of Onset    Heart disease Mother     Hypertension Mother     Lupus Paternal Aunt     Glaucoma Neg Hx     Macular degeneration Neg Hx        Social History     Socioeconomic History    Marital status:    Tobacco Use    Smoking status: Every Day     Packs/day: 0.50     Years: 36.00     Pack years: 18.00     Types: Cigarettes     Start date: 1987    Smokeless tobacco: Never   Substance and Sexual Activity    Alcohol use: Yes     Comment: socially    Drug use: No    Sexual activity: Yes     Partners: Male     Social Determinants of Health     Financial Resource Strain: Low Risk     Difficulty of Paying Living Expenses: Not very hard   Food Insecurity: No Food Insecurity    Worried About Running Out of Food in the Last Year: Never true    Ran Out of Food in the Last Year: Never true   Transportation Needs: No Transportation Needs    Lack of Transportation (Medical): No    Lack of Transportation (Non-Medical): No   Physical Activity: Unknown    Days of Exercise per Week: Patient refused   Stress: Stress Concern Present     Feeling of Stress : To some extent   Social Connections: Unknown    Frequency of Communication with Friends and Family: More than three times a week    Frequency of Social Gatherings with Friends and Family: Twice a week    Active Member of Clubs or Organizations: Patient refused    Attends Club or Organization Meetings: Patient refused    Marital Status:        Physical exam:  There were no vitals filed for this visit.  There is no height or weight on file to calculate BMI.  General: In no apparent distress; well developed and well nourished.  HEENT: normocephalic; atraumatic.  Cardiovascular: regular rate.  Respiratory: no increased work of breathing.  Musculoskeletal:   Gait:  Minimal antalgic  Inspection:   Improvement in regard to lateral based ankle swelling today.  Still with mild-to-moderate residual tenderness on deep palpation at the lateral malleolar fracture site.  Significant improvement in regard to swelling and ecchymosis involving the dorsal forefoot as previously noted.  No medial based ankle swelling or tenderness at the deltoid.  No laxity with anterior drawer testing.    Silfverskiold:  Deferred  Alignment:  Knee: neutral               Ankle: neutral              Hindfoot: neutral              Forefoot: neutral   Strength:              Dorsiflexion 5/5  Plantar flexion 5/5  Inversion 5/5   Eversion 5/5   Sensation:             Good sensation on monofilament testing.  ROM:              Ankle and subtalar:  Limited secondary to pain.  Pulses: 2+ DP/PT pulses.                   Imaging Studies/Outside documentation:  I have ordered/reviewed/interpreted the following images/outside documentation:  1. Weight bearing 3-views of Right foot and ankle:   On my independent review, persistent minimally displaced short spiral fracture of the lateral malleolus at the level of the syndesmosis, Soraida BERNARDO. Approximately 2 mm of shortening, unchanged.  Ankle mortise remains congruent with weight-bearing  stress x-rays.  Also with evidence 2nd and 3rd metatarsal stress reaction versus stress fracture involving the mid diaphysis, now well healed with thickening of the cortices.  No significant degenerative joint changes.          Assessment:  Kylee Burgos is a 52 y.o. female with Minimally displaced Right lateral malleolus fracture, Quigley B.     Plan:   Clinical and radiographic findings were discussed.  We will initiate slow progressive weight-bearing.  50% weight-bearing in the boot x2 weeks then full weight-bearing in the boot.  We will prescribe a short course of physical therapy to help patient with partial weight-bearing, gait training, etc..  Patient voiced understanding.  All questions were answered.  Follow up in 1 month for repeat evaluation and x-ray.      We performed a custom orthotic/brace fitting, adjusting and training with the patient. The patient demonstrated understanding and proper care. This was performed for 15 minutes.      This note was created using voice recognition software and may contain grammatical errors.

## 2023-08-09 ENCOUNTER — CLINICAL SUPPORT (OUTPATIENT)
Dept: REHABILITATION | Facility: HOSPITAL | Age: 53
End: 2023-08-09
Payer: COMMERCIAL

## 2023-08-09 DIAGNOSIS — R29.898 WEAKNESS OF RIGHT LOWER EXTREMITY: ICD-10-CM

## 2023-08-09 DIAGNOSIS — M25.671 DECREASED RANGE OF MOTION OF RIGHT ANKLE: ICD-10-CM

## 2023-08-09 DIAGNOSIS — R26.89 ANTALGIC GAIT: Primary | ICD-10-CM

## 2023-08-09 PROCEDURE — 97530 THERAPEUTIC ACTIVITIES: CPT | Mod: PN

## 2023-08-09 PROCEDURE — 97116 GAIT TRAINING THERAPY: CPT | Mod: PN

## 2023-08-09 PROCEDURE — 97161 PT EVAL LOW COMPLEX 20 MIN: CPT | Mod: PN

## 2023-08-09 PROCEDURE — 97112 NEUROMUSCULAR REEDUCATION: CPT | Mod: PN

## 2023-08-09 NOTE — PLAN OF CARE
[facility]   Physical Therapy Initial Evaluation      Name: Kylee Burgos  Clinic Number: [id number]    Therapy Diagnosis:   Encounter Diagnoses   Name Primary?    Antalgic gait Yes    Decreased range of motion of right ankle     Weakness of right lower extremity         Physician: Danny Gregory MD    Physician Orders: Evaluate and Treat  Medical Diagnosis from Referral: S82.61XA (ICD-10-CM) - Displaced fracture of lateral malleolus of right fibula, initial encounter for closed fracture  Evaluation Date: 8/9/2023  Authorization Period Expiration: 12/29/2023  Plan of Care Expiration: 11/8/23  Progress Note Due: 9/8/23  Visit # / Visits authorized: 1/ 20 (1/12 Plan of Care)   FOTO: 1/3    Precautions: Standard   50% weight bearing in boot till 8/10/23 then full weight bearing in boot    Time In: 1610  Time Out: 1710  Total Appointment Time (timed & untimed codes): 60 minutes    Subjective     Date of onset: 7/09/2023 twisted ankle stepping off curb    History of current condition - Kylee reports: stepping off curb and twisting ankle on 7/9. ED on 7/10 indicating fracture of right distal fibula. Placed in splint and then scheduled to see Orthopedic. Pt transitioned to walking boot non-weight bearing with follow up on 7/27 indicating 2 weeks at 50% weight bearing in boot then to progress to full weight bearing in boot. Pt has follow up apt on 8/29. Pt lives in [facility] and her work is a desk job but she will attend to her grandchildren and has been unable to do so. Pt did report that she started driving.       Imaging: x-ray: Mildly displaced fracture of the distal fibula extending to the syndesmosis.  Fracture fragments are in unchanged alignment.  No significant interval callus formation.  Talar dome is intact.  Ankle mortise is not widened.  Diffuse osseous demineralization, suggesting osteopenia.    Prior Therapy: physical therapy for previous ankle fracture  Social History: lives on  "2nd floor of [facility] complex. Lives alone. Occasionally attends to her grandchildren  Occupation: works from home  Prior Level of Function: independent with all function   Current Level of Function: difficulty navigating stairs, unable to walk without axillary crutches, difficulty with gait > 300 ft.     Pain:  Current 0/10, worst 5/10, best 0/10   Location: right lateral ankle  Description: achy, tight  Aggravating Factors: walking, weight bearing   Easing Factors: ice, NSAID, elevation    Patients goals: to return to OF of walking and stair navigation without difficulty      Medical History:   Past Medical History:   Diagnosis Date    Scleroderma        Surgical History:   Kylee Burgos  has a past surgical history that includes Hysterectomy and  section.    Medications:   Kylee has a current medication list which includes the following prescription(s): bd ultra-fine mini pen needle, blood sugar diagnostic, blood-glucose meter, trulicity, duloxetine, ergocalciferol, hydrochlorothiazide, hydrocodone-acetaminophen, lancets, lancets, lisinopril, metformin, naproxen, ondansetron, onetouch verio test strips, and pravastatin.    Allergies:   Review of patient's allergies indicates:  No Known Allergies     Objective      Palpation for Condition:   Edema Right:   Figure 8: 21"  Malleoli: 8.75"    Edema left:   Figure 8: 20"  Malleoli: 8.5"    Gait: bilateral axillary crutches, low position, crouched gait, <25% weight bearing through right. Good step to gait.      Active Range of Motion:   Ankle Right Left   DF (knee extended)  -12 6   Plantarflexion 50 60   Inversion 25 40   Eversion 10 15       Strength:  Ankle Right Left   Dorsiflexion 3/5 5/5   Plantarflexion  3/5 5/5   Inversion 3/5 5/5   Eversion 2/5 5/5          Joint Mobility: hypomobile talocrural with effusion end-feel.      Palpation: TTP to right lateral ATFL and lateral malleoli      Sensation: intact     Functional Tests:   Stair navigation. " Bilateral UE support and step to      Limitation/Restriction for FOTO LEFS Survey    Therapist reviewed FOTO scores for Kylee Burgos on 8/9/2023.   FOTO documents entered into [employer] - see Media section.    Limitation Score: 56%        Treatment     Total Treatment time (time-based codes) separate from Evaluation: 30 minutes     Kylee received the treatments listed below:      neuromuscular re-education activities to improve: Coordination, Proprioception, and Posture for 10 minutes. The following activities were included:    Seated ankle dorsiflexion x 20  Long sitting dorsiflexion stretch with strap contract relax 5 x 15 sec  Big toe lift x 20  Toes 2-5 lift x 20  Doming x 20    therapeutic activities to improve functional performance for 10 minutes, including:    Education on ankle mobility to restore ADL function. Incorporated ankle ABCs to facilitate.   Towel crunching x 10     gait training to improve functional mobility and safety for 10  minutes, including:  Bilateral axillary crutches adjustment then gait training with upright posture  Worked on 50% weight distribution with crutches.   No AD ambulation. Noted increased limp and difficulty with RLE manipulation  Single crutch gait training with 2 point gait pattern    To be performed:   Ankle 4 way   Talocrural and subtalar mobilizations   Edema control modalities     Patient Education and Home Exercises     Education provided:   - Gait with single crutch ambulation and boot  - ice and elevation prn  - HEP review    Written Home Exercises Provided: yes. Exercises were reviewed and Kylee was able to demonstrate them prior to the end of the session.  Kylee demonstrated good  understanding of the education provided. See EMR under Patient Instructions for exercises provided during therapy sessions.    Assessment     Kylee is a 53 y.o. female referred to outpatient Physical Therapy with a medical diagnosis of Displaced fracture of lateral malleolus of right  fibula, initial encounter for closed fracture. Patient presents with bilateral axillary crutches for ambulation in a walking boot and 50% weight bearing. She has decreased right ankle range of motion and strength with edema. Her current impairments limits her ability to ambulate, navigate steps in and out of apartment. She will benefit from skilled physical therapy to address her impairments to restore her functional independence.     Patient prognosis is Good.   Patient will benefit from skilled outpatient Physical Therapy to address the deficits stated above and in the chart below, provide patient /family education, and to maximize patientt's level of independence.     Plan of care discussed with patient: Yes  Patient's spiritual, cultural and educational needs considered and patient is agreeable to the plan of care and goals as stated below:     Anticipated Barriers for therapy: HPI, hx of ankle fractures    Medical Necessity is demonstrated by the following  History  Co-morbidities and personal factors that may impact the plan of care [x] LOW: no personal factors / co-morbidities  [] MODERATE: 1-2 personal factors / co-morbidities  [] HIGH: 3+ personal factors / co-morbidities    Moderate / High Support Documentation:   Co-morbidities affecting plan of care: hx of ankle fractures     Personal Factors:   lifestyle     Examination  Body Structures and Functions, activity limitations and participation restrictions that may impact the plan of care [x] LOW: addressing 1-2 elements  [] MODERATE: 3+ elements  [] HIGH: 4+ elements (please support below)    Moderate / High Support Documentation: decreased ankle range of motion, antalgic gait      Clinical Presentation [x] LOW: stable  [] MODERATE: Evolving  [] HIGH: Unstable     Decision Making/ Complexity Score: low       Goals:  Short Term Goals: 3 weeks   Ind with HEP to facilitate therapy progressions  Improve right ankle dorsiflexion to -5 degrees  Pt to ambulate >  300 ft over unlevel surfaces with no AD in walking boot  Pt to increase right ankle strength by 1/3 grade for all deficient areas     Long Term Goals: 6 weeks   Patient to improve LEFS intake to >=73%  Pt to increase right ankle range of motion =>95% comparison to left  Pt to increase right ankle strength to >=4/5 for all planes  Pt to ambulate community distances with no boot or AD and without compensation  Pt to navigate x 12 steps with no AD or compensation independently   Plan     Plan of care Certification: 8/9/2023 to 11/8/2023.    Outpatient Physical Therapy 2 times weekly for 6 weeks to include the following interventions: Electrical Stimulation IFC.NMES, Fluidotherapy, Gait Training, Manual Therapy, Moist Heat/ Ice, Neuromuscular Re-ed, Orthotic Management and Training, Patient Education, Self Care, Therapeutic Activities, Therapeutic Exercise, and Ultrasound.     Bel Neri, PT

## 2023-08-10 ENCOUNTER — CLINICAL SUPPORT (OUTPATIENT)
Dept: REHABILITATION | Facility: HOSPITAL | Age: 53
End: 2023-08-10
Payer: COMMERCIAL

## 2023-08-10 DIAGNOSIS — M25.671 DECREASED RANGE OF MOTION OF RIGHT ANKLE: ICD-10-CM

## 2023-08-10 DIAGNOSIS — R29.898 WEAKNESS OF RIGHT LOWER EXTREMITY: ICD-10-CM

## 2023-08-10 DIAGNOSIS — R26.89 ANTALGIC GAIT: Primary | ICD-10-CM

## 2023-08-10 PROCEDURE — 97110 THERAPEUTIC EXERCISES: CPT | Mod: PN,CQ

## 2023-08-10 PROCEDURE — 97530 THERAPEUTIC ACTIVITIES: CPT | Mod: PN,CQ

## 2023-08-10 PROCEDURE — 97140 MANUAL THERAPY 1/> REGIONS: CPT | Mod: PN,CQ

## 2023-08-10 PROCEDURE — 97116 GAIT TRAINING THERAPY: CPT | Mod: PN,CQ

## 2023-08-10 PROCEDURE — 97112 NEUROMUSCULAR REEDUCATION: CPT | Mod: PN,CQ

## 2023-08-10 NOTE — PROGRESS NOTES
[facility]   Physical Therapy Treatment Note      Name: Kylee Quiñonez [facility]  Clinic Number: [id number]    Therapy Diagnosis:   Encounter Diagnoses   Name Primary?    Antalgic gait Yes    Decreased range of motion of right ankle     Weakness of right lower extremity      Physician: Danny Gregory MD    Visit Date: 8/10/2023      Physician Orders: Evaluate and Treat  Medical Diagnosis from Referral: S82.61XA (ICD-10-CM) - Displaced fracture of lateral malleolus of right fibula, initial encounter for closed fracture  Evaluation Date: 8/9/2023  Authorization Period Expiration: 12/31/2023  Plan of Care Expiration: 11/8/23  Progress Note Due: 9/8/23  Visit # / Visits authorized: 1/ 20 (2/12 Plan of Care)   FOTO: 1/3     Precautions: Standard   50% weight bearing in boot till 8/10/23 then full weight bearing in boot     PTA Visit #: 1/5     Time In: 0915   Time Out: 1011   Total Time: 56 minutes  Total Billable Time: 56 minutes      Subjective     Pt reports: no pain. Soreness after evaluation.   .  She was compliant with home exercise program.  Response to previous treatment: soreness  Functional change: none stated    Pain: 0/10  Location: right ankle    Objective      Objective Measures updated at progress report unless specified.     Treatment     Kylee received the treatments listed below:      therapeutic exercises to develop strength, endurance, ROM, and flexibility for 13 minutes including:  Ankle 4 way, yellow band x 20  Ankle alphabet x 1  Ankle circles cw/ccw x 10    manual therapy techniques: Joint mobilizations and Soft tissue Mobilization were applied to the: right ankle and foot for 15 minutes, including:  Talocrural and subtalar mobilizations  Retrograde massage     neuromuscular re-education activities to improve: Coordination, Proprioception, and Posture for 10 minutes. The following activities were included:     Seated ankle dorsiflexion x 20  Long sitting dorsiflexion  stretch with strap contract relax 5 x 15 sec  Big toe lift x 20  Toes 2-5 lift x 20  Doming x 20     therapeutic activities to improve functional performance for 8 minutes, including:  Towel crunching x 10   Addition of 8mm wedge in left shoe for leveling for improved performance with right LE during gait with right boot.  Education re: removing wedge if it causes discomfort.   Education re: brachial plexus protection and to not lean axilla onto axillary crutch.       gait training to improve functional mobility and safety for 10 minutes, including:  Bilateral axillary crutches adjustment then gait training with upright posture  Worked on 50% weight distribution with crutches.   No AD ambulation. Noted increased limp and difficulty with RLE manipulation  Single crutch gait training with 2 point gait pattern     May consider next session: Edema control modalities        Patient Education and Home Exercises       Education provided:   - Educated pt that he/she may feel soreness after session.      Written Home Exercises Provided: yes. Issued yellow band     Exercises were reviewed and Kylee was able to demonstrate them prior to the end of the session.  Kylee demonstrated good  understanding of the education provided. See EMR under Patient Instructions for exercises provided during therapy sessions    Assessment     No pain. Walking with left axillary crutch with left lateral lean and right LE abducted. Good tolerance for evaluation without soreness. Mild edema right ankle which improved with manual therapy. Continued compensatory gait deviations with walking with boot, despite mirror feedback for improved TKE and using wedge in left shoe, so instructed pt to continue using unilateral crutch.  Will benefit from continued physical therapy intervention to progress toward goals set forth in plan of care to improve functional mobility and quality of life.     Kylee Is progressing well towards her goals.   Pt prognosis is  Good.     Pt will continue to benefit from skilled outpatient physical therapy to address the deficits listed in the problem list box on initial evaluation, provide pt/family education and to maximize pt's level of independence in the home and community environment.     Pt's spiritual, cultural and educational needs considered and pt agreeable to plan of care and goals.     Anticipated barriers to physical therapy: HPI, hx of ankle fractures    Goals:   Short Term Goals: 3 weeks  ongoing  Ind with HEP to facilitate therapy progressions  Improve right ankle dorsiflexion to -5 degrees  Pt to ambulate > 300 ft over unlevel surfaces with no AD in walking boot  Pt to increase right ankle strength by 1/3 grade for all deficient areas      Long Term Goals: 6 weeks  ongoing  Patient to improve LEFS intake to >=73%  Pt to increase right ankle range of motion =>95% comparison to left  Pt to increase right ankle strength to >=4/5 for all planes  Pt to ambulate community distances with no boot or AD and without compensation  Pt to navigate x 12 steps with no AD or compensation independently     Plan     Continue per POC, progressing as appropriate to achieve stated goals.    Continue with: Plan of care Certification: 8/9/2023 to 11/8/2023.     Outpatient Physical Therapy 2 times weekly for 6 weeks to include the following interventions: Electrical Stimulation IFC.NMES, Fluidotherapy, Gait Training, Manual Therapy, Moist Heat/ Ice, Neuromuscular Re-ed, Orthotic Management and Training, Patient Education, Self Care, Therapeutic Activities, Therapeutic Exercise, and Ultrasound.     Estefani Lares, PTA

## 2023-08-17 ENCOUNTER — OFFICE VISIT (OUTPATIENT)
Dept: FAMILY MEDICINE | Facility: CLINIC | Age: 53
End: 2023-08-17
Payer: COMMERCIAL

## 2023-08-17 VITALS
DIASTOLIC BLOOD PRESSURE: 82 MMHG | HEIGHT: 64 IN | BODY MASS INDEX: 23.41 KG/M2 | SYSTOLIC BLOOD PRESSURE: 130 MMHG | WEIGHT: 137.13 LBS | OXYGEN SATURATION: 95 % | HEART RATE: 103 BPM

## 2023-08-17 DIAGNOSIS — M79.7 FIBROMYALGIA: ICD-10-CM

## 2023-08-17 DIAGNOSIS — E11.40 TYPE 2 DIABETES MELLITUS WITH DIABETIC NEUROPATHY, WITHOUT LONG-TERM CURRENT USE OF INSULIN: ICD-10-CM

## 2023-08-17 DIAGNOSIS — I10 ESSENTIAL HYPERTENSION: ICD-10-CM

## 2023-08-17 PROCEDURE — 3075F SYST BP GE 130 - 139MM HG: CPT | Mod: CPTII,S$GLB,, | Performed by: STUDENT IN AN ORGANIZED HEALTH CARE EDUCATION/TRAINING PROGRAM

## 2023-08-17 PROCEDURE — 99999 PR PBB SHADOW E&M-EST. PATIENT-LVL IV: ICD-10-PCS | Mod: PBBFAC,,, | Performed by: STUDENT IN AN ORGANIZED HEALTH CARE EDUCATION/TRAINING PROGRAM

## 2023-08-17 PROCEDURE — 99213 OFFICE O/P EST LOW 20 MIN: CPT | Mod: S$GLB,,, | Performed by: STUDENT IN AN ORGANIZED HEALTH CARE EDUCATION/TRAINING PROGRAM

## 2023-08-17 PROCEDURE — 3044F HG A1C LEVEL LT 7.0%: CPT | Mod: CPTII,S$GLB,, | Performed by: STUDENT IN AN ORGANIZED HEALTH CARE EDUCATION/TRAINING PROGRAM

## 2023-08-17 PROCEDURE — 99999 PR PBB SHADOW E&M-EST. PATIENT-LVL IV: CPT | Mod: PBBFAC,,, | Performed by: STUDENT IN AN ORGANIZED HEALTH CARE EDUCATION/TRAINING PROGRAM

## 2023-08-17 PROCEDURE — 3075F PR MOST RECENT SYSTOLIC BLOOD PRESS GE 130-139MM HG: ICD-10-PCS | Mod: CPTII,S$GLB,, | Performed by: STUDENT IN AN ORGANIZED HEALTH CARE EDUCATION/TRAINING PROGRAM

## 2023-08-17 PROCEDURE — 3044F PR MOST RECENT HEMOGLOBIN A1C LEVEL <7.0%: ICD-10-PCS | Mod: CPTII,S$GLB,, | Performed by: STUDENT IN AN ORGANIZED HEALTH CARE EDUCATION/TRAINING PROGRAM

## 2023-08-17 PROCEDURE — 3066F PR DOCUMENTATION OF TREATMENT FOR NEPHROPATHY: ICD-10-PCS | Mod: CPTII,S$GLB,, | Performed by: STUDENT IN AN ORGANIZED HEALTH CARE EDUCATION/TRAINING PROGRAM

## 2023-08-17 PROCEDURE — 3079F PR MOST RECENT DIASTOLIC BLOOD PRESSURE 80-89 MM HG: ICD-10-PCS | Mod: CPTII,S$GLB,, | Performed by: STUDENT IN AN ORGANIZED HEALTH CARE EDUCATION/TRAINING PROGRAM

## 2023-08-17 PROCEDURE — 3079F DIAST BP 80-89 MM HG: CPT | Mod: CPTII,S$GLB,, | Performed by: STUDENT IN AN ORGANIZED HEALTH CARE EDUCATION/TRAINING PROGRAM

## 2023-08-17 PROCEDURE — 1159F MED LIST DOCD IN RCRD: CPT | Mod: CPTII,S$GLB,, | Performed by: STUDENT IN AN ORGANIZED HEALTH CARE EDUCATION/TRAINING PROGRAM

## 2023-08-17 PROCEDURE — 3008F BODY MASS INDEX DOCD: CPT | Mod: CPTII,S$GLB,, | Performed by: STUDENT IN AN ORGANIZED HEALTH CARE EDUCATION/TRAINING PROGRAM

## 2023-08-17 PROCEDURE — 99213 PR OFFICE/OUTPT VISIT, EST, LEVL III, 20-29 MIN: ICD-10-PCS | Mod: S$GLB,,, | Performed by: STUDENT IN AN ORGANIZED HEALTH CARE EDUCATION/TRAINING PROGRAM

## 2023-08-17 PROCEDURE — 3060F PR POS MICROALBUMINURIA RESULT DOCUMENTED/REVIEW: ICD-10-PCS | Mod: CPTII,S$GLB,, | Performed by: STUDENT IN AN ORGANIZED HEALTH CARE EDUCATION/TRAINING PROGRAM

## 2023-08-17 PROCEDURE — 1159F PR MEDICATION LIST DOCUMENTED IN MEDICAL RECORD: ICD-10-PCS | Mod: CPTII,S$GLB,, | Performed by: STUDENT IN AN ORGANIZED HEALTH CARE EDUCATION/TRAINING PROGRAM

## 2023-08-17 PROCEDURE — 4010F PR ACE/ARB THEARPY RXD/TAKEN: ICD-10-PCS | Mod: CPTII,S$GLB,, | Performed by: STUDENT IN AN ORGANIZED HEALTH CARE EDUCATION/TRAINING PROGRAM

## 2023-08-17 PROCEDURE — 3066F NEPHROPATHY DOC TX: CPT | Mod: CPTII,S$GLB,, | Performed by: STUDENT IN AN ORGANIZED HEALTH CARE EDUCATION/TRAINING PROGRAM

## 2023-08-17 PROCEDURE — 4010F ACE/ARB THERAPY RXD/TAKEN: CPT | Mod: CPTII,S$GLB,, | Performed by: STUDENT IN AN ORGANIZED HEALTH CARE EDUCATION/TRAINING PROGRAM

## 2023-08-17 PROCEDURE — 3060F POS MICROALBUMINURIA REV: CPT | Mod: CPTII,S$GLB,, | Performed by: STUDENT IN AN ORGANIZED HEALTH CARE EDUCATION/TRAINING PROGRAM

## 2023-08-17 PROCEDURE — 3008F PR BODY MASS INDEX (BMI) DOCUMENTED: ICD-10-PCS | Mod: CPTII,S$GLB,, | Performed by: STUDENT IN AN ORGANIZED HEALTH CARE EDUCATION/TRAINING PROGRAM

## 2023-08-17 RX ORDER — HYDROCHLOROTHIAZIDE 12.5 MG/1
12.5 TABLET ORAL DAILY
Qty: 90 TABLET | Refills: 3 | Status: SHIPPED | OUTPATIENT
Start: 2023-08-17 | End: 2024-03-06 | Stop reason: SDUPTHER

## 2023-08-17 NOTE — ASSESSMENT & PLAN NOTE
No change in pain with decrease in duloxetine. Can take 30mg every other day for several weeks then discontinue medication.

## 2023-08-17 NOTE — PROGRESS NOTES
Name: Kylee Burgos  MRN: [id number]  : 1970  PCP: Hector Gallardo MD    HPI  Patient is here today to follow up on blood pressure. Tolerating HCTZ well. BP has improved. Recent blood work shows normal potassium while taking medication.     Tapered to 30mg duloxetine. Wishes to taper completely. No change in pain.     I am assuming care of her diabetes from our endocrine clinic.     Review of Systems   All other systems reviewed and are negative.      Patient Active Problem List   Diagnosis    Multiple joint pain    Hand numbness    Vitamin D deficiency disease    Fibromyalgia    Anxiety    Essential hypertension    Type 2 diabetes mellitus with diabetic neuropathy, without long-term current use of insulin    Mixed hyperlipidemia    Antalgic gait    Decreased range of motion of right ankle    Weakness of right lower extremity       Vitals:    23 1339   BP: 130/82   Pulse:        Physical Exam  Constitutional:       General: She is not in acute distress.     Appearance: Normal appearance. She is well-developed.   HENT:      Head: Normocephalic and atraumatic.      Right Ear: External ear normal.      Left Ear: External ear normal.   Eyes:      Conjunctiva/sclera: Conjunctivae normal.   Pulmonary:      Effort: Pulmonary effort is normal. No respiratory distress.   Abdominal:      General: Abdomen is flat. There is no distension.   Musculoskeletal:         General: No swelling or deformity.      Right lower leg: No edema.      Left lower leg: No edema.   Skin:     General: Skin is warm and dry.      Coloration: Skin is not jaundiced.   Neurological:      Mental Status: She is alert and oriented to person, place, and time. Mental status is at baseline.   Psychiatric:         Attention and Perception: Attention and perception normal.         Mood and Affect: Mood normal.         Speech: Speech normal.         Behavior: Behavior normal. Behavior is cooperative.         Thought Content: Thought content  normal.         Cognition and Memory: Cognition normal.         Judgment: Judgment normal.         1. Essential hypertension  Assessment & Plan:  Improved. Continue current medications. Emphasized lifestyle changes, particularly exercise.    Orders:  -     hydroCHLOROthiazide (HYDRODIURIL) 12.5 MG Tab; Take 1 tablet (12.5 mg total) by mouth once daily.  Dispense: 90 tablet; Refill: 3    2. Fibromyalgia  Assessment & Plan:  No change in pain with decrease in duloxetine. Can take 30mg every other day for several weeks then discontinue medication.      3. Type 2 diabetes mellitus with diabetic neuropathy, without long-term current use of insulin  Assessment & Plan:  Last A1C was 5.9. Continue current regimen.          Follow up in 6 months    Hector Gallardo MD  08/17/2023

## 2023-08-17 NOTE — PROGRESS NOTES
[facility]   Physical Therapy Treatment Note      Name: Kylee Quiñonez [facility]  Clinic Number: [id number]    Therapy Diagnosis:   Encounter Diagnoses   Name Primary?    Antalgic gait Yes    Decreased range of motion of right ankle     Weakness of right lower extremity        Physician: Danny Gregory MD    Visit Date: 8/18/2023      Physician Orders: Evaluate and Treat  Medical Diagnosis from Referral: S82.61XA (ICD-10-CM) - Displaced fracture of lateral malleolus of right fibula, initial encounter for closed fracture  Evaluation Date: 8/9/2023  Authorization Period Expiration: 12/31/2023  Plan of Care Expiration: 11/8/23  Progress Note Due: 9/8/23  Visit # / Visits authorized: 2/ 20 (3/12 Plan of Care)   FOTO: 1/3     Precautions: Standard   50% weight bearing in boot till 8/10/23 then full weight bearing in boot     PTA Visit #: 2/5     Time In: 0918   Time Out: 1005   Total Time: 47 minutes  Total Billable Time: 47 minutes      Subjective     Pt reports: no pain. No soreness after previous session. Home exercise program once to twice per day. Walking with crutch in clinic but states she hasn't been walking with it much lately. Admits she sometimes walks without the boot at home.   .  She was compliant with home exercise program.  Response to previous treatment: soreness  Functional change: none stated    Pain: 0/10  Location: right ankle    Objective      Objective Measures updated at progress report unless specified.     Treatment     Kylee received the treatments listed below:      therapeutic exercises to develop strength, endurance, ROM, and flexibility for 10 minutes including:  Ankle 4 way, red band x 20  Ankle alphabet x 1-NP  Ankle circles cw/ccw x 20    manual therapy techniques: Joint mobilizations and Soft tissue Mobilization were applied to the: right ankle and foot for 08 minutes, including:  Talocrural and subtalar mobilizations  Retrograde massage     neuromuscular  re-education activities to improve: Coordination, Proprioception, and Posture for 13 minutes. The following activities were included:     Seated ankle dorsiflexion x 20  Long sitting dorsiflexion stretch with strap contract relax 5 x 15 sec  Big toe lift x 20  Toes 2-5 lift x 20  Doming x 20  Seated HR/TR x 20  Seated IR with towel on floor x 20       therapeutic activities to improve functional performance for 08 minutes, including:  Towel crunching x 5   Discussion re: importance of wearing boot with all weight bearing activities for proper healing and ankle protection.   Education re: removing wedge if it causes discomfort.       Not performed this date:  Education re: brachial plexus protection and to not lean axilla onto axillary crutch.    Addition of 8mm wedge in left shoe for leveling for improved performance with right LE during gait with right boot 8/10 2023.       gait training to improve functional mobility and safety for 08 minutes, including:  Bilateral axillary crutches adjustment then gait training with upright posture  Worked on 100% weight distribution with and without crutches. No change in gait quality with slight right limitation in TKE, but improved with verbal cues. Training in PT gym and outside on unlevel surfaces.        May consider next session: Edema control modalities        Patient Education and Home Exercises       Education provided:   - Educated pt that he/she may feel soreness after session.      Written Home Exercises Provided: yes. Issued red band for home exercise program.     Exercises were reviewed and Kylee was able to demonstrate them prior to the end of the session.  Kylee demonstrated good  understanding of the education provided. See EMR under Patient Instructions for exercises provided during therapy sessions    Assessment     No pain. Cues with doming to prevent ER and pronation. Walking without crutch while wearing right boot. Progressed herself to walking without  crutch for the most part with slight limitation in right TKE which is improved with verbal cues. Admits to walking without boot at home, so instructed pt to wear boot with all WB activities for healing and joint protection. Progressed strengthening with good response. Tendency to pronate and emil with intrinsics, so required verbal cues and tactile cues.  Will benefit from continued physical therapy intervention to progress toward goals set forth in plan of care to improve functional mobility and quality of life.      Kylee Is progressing well towards her goals.   Pt prognosis is Good.     Pt will continue to benefit from skilled outpatient physical therapy to address the deficits listed in the problem list box on initial evaluation, provide pt/family education and to maximize pt's level of independence in the home and community environment.     Pt's spiritual, cultural and educational needs considered and pt agreeable to plan of care and goals.     Anticipated barriers to physical therapy: HPI, hx of ankle fractures    Goals:   Short Term Goals: 3 weeks  ongoing  Ind with HEP to facilitate therapy progressions  Improve right ankle dorsiflexion to -5 degrees  Pt to ambulate > 300 ft over unlevel surfaces with no AD in walking boot  Pt to increase right ankle strength by 1/3 grade for all deficient areas      Long Term Goals: 6 weeks  ongoing  Patient to improve LEFS intake to >=73%  Pt to increase right ankle range of motion =>95% comparison to left  Pt to increase right ankle strength to >=4/5 for all planes  Pt to ambulate community distances with no boot or AD and without compensation  Pt to navigate x 12 steps with no AD or compensation independently     Plan     Continue per POC, progressing as appropriate to achieve stated goals.    Continue with: Plan of care Certification: 8/9/2023 to 11/8/2023.     Outpatient Physical Therapy 2 times weekly for 6 weeks to include the following interventions: Electrical  Stimulation IFC.NMES, Fluidotherapy, Gait Training, Manual Therapy, Moist Heat/ Ice, Neuromuscular Re-ed, Orthotic Management and Training, Patient Education, Self Care, Therapeutic Activities, Therapeutic Exercise, and Ultrasound.     Estefani Lares, PTA

## 2023-08-18 ENCOUNTER — CLINICAL SUPPORT (OUTPATIENT)
Dept: REHABILITATION | Facility: HOSPITAL | Age: 53
End: 2023-08-18
Payer: COMMERCIAL

## 2023-08-18 DIAGNOSIS — R26.89 ANTALGIC GAIT: Primary | ICD-10-CM

## 2023-08-18 DIAGNOSIS — M25.671 DECREASED RANGE OF MOTION OF RIGHT ANKLE: ICD-10-CM

## 2023-08-18 DIAGNOSIS — R29.898 WEAKNESS OF RIGHT LOWER EXTREMITY: ICD-10-CM

## 2023-08-18 PROCEDURE — 97112 NEUROMUSCULAR REEDUCATION: CPT | Mod: PN,CQ

## 2023-08-18 PROCEDURE — 97530 THERAPEUTIC ACTIVITIES: CPT | Mod: PN,CQ

## 2023-08-18 PROCEDURE — 97110 THERAPEUTIC EXERCISES: CPT | Mod: PN,CQ

## 2023-08-18 PROCEDURE — 97140 MANUAL THERAPY 1/> REGIONS: CPT | Mod: PN,CQ

## 2023-08-18 PROCEDURE — 97116 GAIT TRAINING THERAPY: CPT | Mod: PN,CQ

## 2023-08-22 ENCOUNTER — CLINICAL SUPPORT (OUTPATIENT)
Dept: REHABILITATION | Facility: HOSPITAL | Age: 53
End: 2023-08-22
Payer: COMMERCIAL

## 2023-08-22 DIAGNOSIS — M25.671 DECREASED RANGE OF MOTION OF RIGHT ANKLE: ICD-10-CM

## 2023-08-22 DIAGNOSIS — R26.89 ANTALGIC GAIT: Primary | ICD-10-CM

## 2023-08-22 DIAGNOSIS — R29.898 WEAKNESS OF RIGHT LOWER EXTREMITY: ICD-10-CM

## 2023-08-22 PROCEDURE — 97110 THERAPEUTIC EXERCISES: CPT | Mod: PN,CQ

## 2023-08-22 PROCEDURE — 97530 THERAPEUTIC ACTIVITIES: CPT | Mod: PN,CQ

## 2023-08-22 PROCEDURE — 97112 NEUROMUSCULAR REEDUCATION: CPT | Mod: PN,CQ

## 2023-08-22 PROCEDURE — 97140 MANUAL THERAPY 1/> REGIONS: CPT | Mod: PN,CQ

## 2023-08-22 NOTE — PROGRESS NOTES
[facility]   Physical Therapy Treatment Note      Name: Kylee Quiñonez [facility]  Clinic Number: [id number]    Therapy Diagnosis:   Encounter Diagnoses   Name Primary?    Antalgic gait Yes    Decreased range of motion of right ankle     Weakness of right lower extremity          Physician: Danny Gregory MD    Visit Date: 8/22/2023      Physician Orders: Evaluate and Treat  Medical Diagnosis from Referral: S82.61XA (ICD-10-CM) - Displaced fracture of lateral malleolus of right fibula, initial encounter for closed fracture  Evaluation Date: 8/9/2023  Authorization Period Expiration: 12/31/2023  Plan of Care Expiration: 11/8/23  Progress Note Due: 9/8/23  Visit # / Visits authorized: 3/ 20 (4/12 Plan of Care)   FOTO: 1/3     Precautions: Standard   50% weight bearing in boot till 8/10/23 then full weight bearing in boot     PTA Visit #: 3/5     Time In: 1438   Time Out: 1518   Total Time: 40 minutes  Total Billable Time: 40 minutes      Subjective     Pt reports: Stands when she showers with TTWB right LE. No pain. Wearing right walking boot and left new tennis shoe with thick sole. No AD.   .  She was compliant with home exercise program.  Response to previous treatment: soreness  Functional change: none stated    Pain:  0/10  Location: right ankle    Objective      Objective Measures updated at progress report unless specified.     Treatment     Kylee received the treatments listed below:      therapeutic exercises to develop strength, endurance, ROM, and flexibility for 13 minutes including:  Ankle 4 way, green band x 20  Ankle alphabet x 1 elevated  Ankle circles cw/ccw x 20 elevated    manual therapy techniques: Joint mobilizations and Soft tissue Mobilization were applied to the: right ankle and foot for 10 minutes, including:  Talocrural and subtalar mobilizations  Retrograde massage     neuromuscular re-education activities to improve: Coordination, Proprioception, and Posture for 09  minutes. The following activities were included:     Long sitting dorsiflexion stretch with strap contract relax 5 x 15 seconds-manual  Big toe lift x 20-NP  Toes 2-5 lift x 20-NP  Doming x 20  Seated HR/TR x 20  Seated IR with towel on floor x 20       therapeutic activities to improve functional performance for 08 minutes, including:  Towel crunching x 5   Discussion re: importance of wearing boot with all weight bearing activities for proper healing and ankle protection.   Education re: removing wedge if it causes discomfort.       Not performed this date:  Education re: brachial plexus protection and to not lean axilla onto axillary crutch.    Addition of 8mm wedge in left shoe for leveling for improved performance with right LE during gait with right boot 8/10 2023.       gait training to improve functional mobility and safety for 00 minutes, including:  Bilateral axillary crutches adjustment then gait training with upright posture  Worked on 100% weight distribution with and without crutches. No change in gait quality with slight right limitation in TKE, but improved with verbal cues. Training in PT gym and outside on unlevel surfaces.        May consider next session: Edema control modalities        Patient Education and Home Exercises       Education provided:   - Educated pt that he/she may feel soreness after session.      Written Home Exercises Provided: yes. Issued green band for home exercise program.     Exercises were reviewed and Kylee was able to demonstrate them prior to the end of the session.  Kylee demonstrated good  understanding of the education provided. See EMR under Patient Instructions for exercises provided during therapy sessions    Assessment     Initial edema and stiffness which improved after manual therapy. No pain and improved gait since last session with thicker sole in her new shoe which levels her better. Reminded pt of orthopedic precautions and not WB through right LE without  boot. Progressed strengthening with good response and issued green band for home-assistance for set up of ankle 4 way 2* pt having difficulty with this. Instructed to have someone help her with home exercise program if needed.Tendency to pronate and emil with intrinsics, so required verbal cues and tactile cues. Improved edema and no pain after session.  Will benefit from continued physical therapy intervention to progress toward goals set forth in plan of care to improve functional mobility and quality of life.      Kylee Is progressing well towards her goals.   Pt prognosis is Good.     Pt will continue to benefit from skilled outpatient physical therapy to address the deficits listed in the problem list box on initial evaluation, provide pt/family education and to maximize pt's level of independence in the home and community environment.     Pt's spiritual, cultural and educational needs considered and pt agreeable to plan of care and goals.     Anticipated barriers to physical therapy: HPI, hx of ankle fractures    Goals:   Short Term Goals: 3 weeks  ongoing  Ind with HEP to facilitate therapy progressions  Improve right ankle dorsiflexion to -5 degrees  Pt to ambulate > 300 ft over unlevel surfaces with no AD in walking boot  Pt to increase right ankle strength by 1/3 grade for all deficient areas      Long Term Goals: 6 weeks  ongoing  Patient to improve LEFS intake to >=73%  Pt to increase right ankle range of motion =>95% comparison to left  Pt to increase right ankle strength to >=4/5 for all planes  Pt to ambulate community distances with no boot or AD and without compensation  Pt to navigate x 12 steps with no AD or compensation independently     Plan     Continue per POC, progressing as appropriate to achieve stated goals.    Continue with: Plan of care Certification: 8/9/2023 to 11/8/2023.     Outpatient Physical Therapy 2 times weekly for 6 weeks to include the following interventions: Electrical  Stimulation IFC.NMES, Fluidotherapy, Gait Training, Manual Therapy, Moist Heat/ Ice, Neuromuscular Re-ed, Orthotic Management and Training, Patient Education, Self Care, Therapeutic Activities, Therapeutic Exercise, and Ultrasound.     Estefani Lares, PTA

## 2023-08-24 ENCOUNTER — CLINICAL SUPPORT (OUTPATIENT)
Dept: REHABILITATION | Facility: HOSPITAL | Age: 53
End: 2023-08-24
Payer: COMMERCIAL

## 2023-08-24 DIAGNOSIS — M25.671 DECREASED RANGE OF MOTION OF RIGHT ANKLE: ICD-10-CM

## 2023-08-24 DIAGNOSIS — R26.89 ANTALGIC GAIT: Primary | ICD-10-CM

## 2023-08-24 DIAGNOSIS — R29.898 WEAKNESS OF RIGHT LOWER EXTREMITY: ICD-10-CM

## 2023-08-24 PROCEDURE — 97112 NEUROMUSCULAR REEDUCATION: CPT | Mod: PN,CQ

## 2023-08-24 PROCEDURE — 97140 MANUAL THERAPY 1/> REGIONS: CPT | Mod: PN,CQ

## 2023-08-24 PROCEDURE — 97110 THERAPEUTIC EXERCISES: CPT | Mod: PN,CQ

## 2023-08-24 NOTE — PROGRESS NOTES
[facility]   Physical Therapy Treatment Note      Name: Kylee Quiñonez [facility]  Clinic Number: [id number]    Therapy Diagnosis:   Encounter Diagnoses   Name Primary?    Antalgic gait Yes    Decreased range of motion of right ankle     Weakness of right lower extremity        Physician: Danny Gregory MD    Visit Date: 8/24/2023      Physician Orders: Evaluate and Treat  Medical Diagnosis from Referral: S82.61XA (ICD-10-CM) - Displaced fracture of lateral malleolus of right fibula, initial encounter for closed fracture  Evaluation Date: 8/9/2023  Authorization Period Expiration: 12/31/2023  Plan of Care Expiration: 11/8/23  Progress Note Due: 9/8/23  Visit # / Visits authorized: 4/ 20 (5/12 Plan of Care)   FOTO: 1/3     Precautions: Standard   50% weight bearing in boot till 8/10/23 then full weight bearing in boot     PTA Visit #: 4/5     Time In: 1121   Time Out: 1205   Total Time: 44 minutes  Total Billable Time: 44 minutes      Subjective     Pt reports: a little sore after last session. Wearing right walking boot and left new tennis shoe with thick sole. No AD.   .  She was compliant with home exercise program.  Response to previous treatment: soreness  Functional change: none stated    Pain:  0/10  Location: right ankle    Objective      Objective Measures updated at progress report unless specified.     Treatment     Kylee received the treatments listed below:      therapeutic exercises to develop strength, endurance, ROM, and flexibility for 10 minutes including:  Ankle 4 way, green band x 30  Ankle alphabet x 1 -NP  Ankle circles cw/ccw x 20     manual therapy techniques: Joint mobilizations and Soft tissue Mobilization were applied to the: right ankle and foot for 13 minutes, including:  Talocrural and subtalar mobilizations  NP-Retrograde massage  STM gastrocs, soleus, peroneus     neuromuscular re-education activities to improve: Coordination, Proprioception, and Posture for 21  minutes. The following activities were included:     Long sitting dorsiflexion stretch with strap contract relax 5 x 15 seconds  Big toe lift x 20  Toes 2-5 lift x 20  Doming x 20-manual cues to prevent pronation   Seated HR/TR 2 x 10  Towel crunching x 5  Seated IR with towel on floor,1# x 10   HR with tennis ball between heels x 15  HR on 1/2 foam x 10  Inglis pickup x 1 bucket  Toe splay x 20    therapeutic activities to improve functional performance for 00 minutes, including:   Discussion re: importance of wearing boot with all weight bearing activities for proper healing and ankle protection.   Education re: removing wedge if it causes discomfort.       Not performed this date:  Education re: brachial plexus protection and to not lean axilla onto axillary crutch.    Addition of 8mm wedge in left shoe for leveling for improved performance with right LE during gait with right boot 8/10 2023.       gait training to improve functional mobility and safety for 00 minutes, including:  Bilateral axillary crutches adjustment then gait training with upright posture  Worked on 100% weight distribution with and without crutches. No change in gait quality with slight right limitation in TKE, but improved with verbal cues. Training in PT gym and outside on unlevel surfaces.        May consider next session: Edema control modalities        Patient Education and Home Exercises       Education provided:   - Educated pt that he/she may feel soreness after session.      Written Home Exercises Provided: Instructed patient to continue current home exercise program.    Exercises were reviewed and Kylee was able to demonstrate them prior to the end of the session.  Kylee demonstrated good  understanding of the education provided. See EMR under Patient Instructions for exercises provided during therapy sessions    Assessment     Improved pain and edema today. Tenderness medial gastroc and peroneus longus which improved after manual  therapy. Initial cues and A for set up of ankle 4 way with resistance with good carryover to completion.  Reminded pt of orthopedic precautions and not WB through right LE without boot. Tendency to pronate and emil with intrinsics, so required verbal cues and tactile cues. Improved edema and no pain after session. Good tolerance for progression of strengthening and ROM without pain provocation.  Will benefit from continued physical therapy intervention to progress toward goals set forth in plan of care to improve functional mobility and quality of life.      Kylee Is progressing well towards her goals.   Pt prognosis is Good.     Pt will continue to benefit from skilled outpatient physical therapy to address the deficits listed in the problem list box on initial evaluation, provide pt/family education and to maximize pt's level of independence in the home and community environment.     Pt's spiritual, cultural and educational needs considered and pt agreeable to plan of care and goals.     Anticipated barriers to physical therapy: HPI, hx of ankle fractures    Goals:   Short Term Goals: 3 weeks  ongoing  Ind with HEP to facilitate therapy progressions  Improve right ankle dorsiflexion to -5 degrees  Pt to ambulate > 300 ft over unlevel surfaces with no AD in walking boot  Pt to increase right ankle strength by 1/3 grade for all deficient areas      Long Term Goals: 6 weeks  ongoing  Patient to improve LEFS intake to >=73%  Pt to increase right ankle range of motion =>95% comparison to left  Pt to increase right ankle strength to >=4/5 for all planes  Pt to ambulate community distances with no boot or AD and without compensation  Pt to navigate x 12 steps with no AD or compensation independently     Plan     Continue per POC, progressing as appropriate to achieve stated goals.    Continue with: Plan of care Certification: 8/9/2023 to 11/8/2023.     Outpatient Physical Therapy 2 times weekly for 6 weeks to include  the following interventions: Electrical Stimulation IFC.NMES, Fluidotherapy, Gait Training, Manual Therapy, Moist Heat/ Ice, Neuromuscular Re-ed, Orthotic Management and Training, Patient Education, Self Care, Therapeutic Activities, Therapeutic Exercise, and Ultrasound.     Estefani Laers, PTA

## 2023-08-25 DIAGNOSIS — S82.61XA DISPLACED FRACTURE OF LATERAL MALLEOLUS OF RIGHT FIBULA, INITIAL ENCOUNTER FOR CLOSED FRACTURE: Primary | ICD-10-CM

## 2023-08-29 ENCOUNTER — OFFICE VISIT (OUTPATIENT)
Dept: ORTHOPEDICS | Facility: CLINIC | Age: 53
End: 2023-08-29
Payer: COMMERCIAL

## 2023-08-29 ENCOUNTER — HOSPITAL ENCOUNTER (OUTPATIENT)
Dept: RADIOLOGY | Facility: HOSPITAL | Age: 53
Discharge: HOME | End: 2023-08-29
Attending: ORTHOPAEDIC SURGERY
Payer: COMMERCIAL

## 2023-08-29 VITALS — HEIGHT: 64 IN | BODY MASS INDEX: 23.39 KG/M2 | WEIGHT: 137 LBS

## 2023-08-29 DIAGNOSIS — S82.61XA DISPLACED FRACTURE OF LATERAL MALLEOLUS OF RIGHT FIBULA, INITIAL ENCOUNTER FOR CLOSED FRACTURE: Primary | ICD-10-CM

## 2023-08-29 DIAGNOSIS — S82.61XA DISPLACED FRACTURE OF LATERAL MALLEOLUS OF RIGHT FIBULA, INITIAL ENCOUNTER FOR CLOSED FRACTURE: ICD-10-CM

## 2023-08-29 PROCEDURE — 99213 OFFICE O/P EST LOW 20 MIN: CPT | Mod: S$GLB,,, | Performed by: ORTHOPAEDIC SURGERY

## 2023-08-29 PROCEDURE — 99999 PR PBB SHADOW E&M-EST. PATIENT-LVL III: ICD-10-PCS | Mod: PBBFAC,,, | Performed by: ORTHOPAEDIC SURGERY

## 2023-08-29 PROCEDURE — 3060F PR POS MICROALBUMINURIA RESULT DOCUMENTED/REVIEW: ICD-10-PCS | Mod: CPTII,S$GLB,, | Performed by: ORTHOPAEDIC SURGERY

## 2023-08-29 PROCEDURE — 3044F PR MOST RECENT HEMOGLOBIN A1C LEVEL <7.0%: ICD-10-PCS | Mod: CPTII,S$GLB,, | Performed by: ORTHOPAEDIC SURGERY

## 2023-08-29 PROCEDURE — 4010F ACE/ARB THERAPY RXD/TAKEN: CPT | Mod: CPTII,S$GLB,, | Performed by: ORTHOPAEDIC SURGERY

## 2023-08-29 PROCEDURE — 3008F BODY MASS INDEX DOCD: CPT | Mod: CPTII,S$GLB,, | Performed by: ORTHOPAEDIC SURGERY

## 2023-08-29 PROCEDURE — 3066F PR DOCUMENTATION OF TREATMENT FOR NEPHROPATHY: ICD-10-PCS | Mod: CPTII,S$GLB,, | Performed by: ORTHOPAEDIC SURGERY

## 2023-08-29 PROCEDURE — 1160F PR REVIEW ALL MEDS BY PRESCRIBER/CLIN PHARMACIST DOCUMENTED: ICD-10-PCS | Mod: CPTII,S$GLB,, | Performed by: ORTHOPAEDIC SURGERY

## 2023-08-29 PROCEDURE — 1159F MED LIST DOCD IN RCRD: CPT | Mod: CPTII,S$GLB,, | Performed by: ORTHOPAEDIC SURGERY

## 2023-08-29 PROCEDURE — 73610 X-RAY EXAM OF ANKLE: CPT | Mod: 26,RT,, | Performed by: RADIOLOGY

## 2023-08-29 PROCEDURE — 99999 PR PBB SHADOW E&M-EST. PATIENT-LVL III: CPT | Mod: PBBFAC,,, | Performed by: ORTHOPAEDIC SURGERY

## 2023-08-29 PROCEDURE — 3008F PR BODY MASS INDEX (BMI) DOCUMENTED: ICD-10-PCS | Mod: CPTII,S$GLB,, | Performed by: ORTHOPAEDIC SURGERY

## 2023-08-29 PROCEDURE — 73610 XR ANKLE COMPLETE 3 VIEW RIGHT: ICD-10-PCS | Mod: 26,RT,, | Performed by: RADIOLOGY

## 2023-08-29 PROCEDURE — 4010F PR ACE/ARB THEARPY RXD/TAKEN: ICD-10-PCS | Mod: CPTII,S$GLB,, | Performed by: ORTHOPAEDIC SURGERY

## 2023-08-29 PROCEDURE — 3044F HG A1C LEVEL LT 7.0%: CPT | Mod: CPTII,S$GLB,, | Performed by: ORTHOPAEDIC SURGERY

## 2023-08-29 PROCEDURE — 3060F POS MICROALBUMINURIA REV: CPT | Mod: CPTII,S$GLB,, | Performed by: ORTHOPAEDIC SURGERY

## 2023-08-29 PROCEDURE — 73610 X-RAY EXAM OF ANKLE: CPT | Mod: TC,PO,RT

## 2023-08-29 PROCEDURE — 1160F RVW MEDS BY RX/DR IN RCRD: CPT | Mod: CPTII,S$GLB,, | Performed by: ORTHOPAEDIC SURGERY

## 2023-08-29 PROCEDURE — 1159F PR MEDICATION LIST DOCUMENTED IN MEDICAL RECORD: ICD-10-PCS | Mod: CPTII,S$GLB,, | Performed by: ORTHOPAEDIC SURGERY

## 2023-08-29 PROCEDURE — 3066F NEPHROPATHY DOC TX: CPT | Mod: CPTII,S$GLB,, | Performed by: ORTHOPAEDIC SURGERY

## 2023-08-29 PROCEDURE — 99213 PR OFFICE/OUTPT VISIT, EST, LEVL III, 20-29 MIN: ICD-10-PCS | Mod: S$GLB,,, | Performed by: ORTHOPAEDIC SURGERY

## 2023-08-29 NOTE — PROGRESS NOTES
"Status/Diagnosis: Minimally displaced Right lateral malleolus fracture, Quigley B.  Date of Surgery: none  Date of Injury: 2023  Return visit:6 weeks  X-rays on Return: Single leg stance WB 3-views Right ankle    Chief Complaint:   Chief Complaint   Patient presents with    Right Ankle - Post-op Evaluation     Present History:  Kylee Burgos is a 53 y.o. female who returns today for repeat clinical evaluation.  Overall doing well.  Endorses 0/10 pain.  Currently weight-bearing as tolerated in a tall boot in place.  Overall doing well.  No new injuries.  Denies any numbness or tingling.    Past medical history significant for type 2 diabetes, A1c of 5.9; and chronic tobacco use.  Patient smokes 1+ pack per day for the last 20+ years.  Works from home.      Past Medical History:   Diagnosis Date    Scleroderma        Past Surgical History:   Procedure Laterality Date     SECTION      HYSTERECTOMY         Current Outpatient Medications   Medication Sig    BD INSULIN PEN NEEDLE UF MINI 31 gauge x 3/16" Ndle     blood sugar diagnostic Strp To check BG 1 time daily, to use with insurance preferred meter    dulaglutide (TRULICITY) 0.75 mg/0.5 mL pen injector Inject 0.75 mg into the skin every 7 days.    DULoxetine (CYMBALTA) 60 MG capsule Take 1 capsule (60 mg total) by mouth once daily. (Patient taking differently: Take 30 mg by mouth once daily.)    ergocalciferol (ERGOCALCIFEROL) 50,000 unit Cap Take 1 capsule (50,000 Units total) by mouth every 7 days.    hydroCHLOROthiazide (HYDRODIURIL) 12.5 MG Tab Take 1 tablet (12.5 mg total) by mouth once daily.    HYDROcodone-acetaminophen (NORCO) 5-325 mg per tablet Take 1 tablet by mouth every 6 (six) hours as needed for Pain.    lancets 33 gauge Misc 1 lancet by Misc.(Non-Drug; Combo Route) route 4 (four) times daily.    lancets Misc To check BG 1 time daily, to use with insurance preferred meter    lisinopriL (PRINIVIL,ZESTRIL) 40 MG tablet Take 1 tablet (40 " mg total) by mouth once daily.    metFORMIN (GLUCOPHAGE-XR) 500 MG ER 24hr tablet Take 2 tablets (1,000 mg total) by mouth 2 (two) times daily with meals.    naproxen (NAPROSYN) 500 MG tablet Take 1 tablet (500 mg total) by mouth 2 (two) times daily with meals.    ondansetron (ZOFRAN-ODT) 8 MG TbDL Take 1 tablet (8 mg total) by mouth every 8 (eight) hours as needed (for nausea).    ONETOUCH VERIO TEST STRIPS Strp USE TO TEST ONCE DAILY    pravastatin (PRAVACHOL) 20 MG tablet Take 1 tablet (20 mg total) by mouth every evening.    blood-glucose meter kit To check BG 1 time daily, to use with insurance preferred meter     No current facility-administered medications for this visit.       Review of patient's allergies indicates:  No Known Allergies    Family History   Problem Relation Age of Onset    Heart disease Mother     Hypertension Mother     Lupus Paternal Aunt     Glaucoma Neg Hx     Macular degeneration Neg Hx        Social History     Socioeconomic History    Marital status:    Tobacco Use    Smoking status: Every Day     Current packs/day: 0.50     Average packs/day: 0.5 packs/day for 36.7 years (18.3 ttl pk-yrs)     Types: Cigarettes     Start date: 1987    Smokeless tobacco: Never   Substance and Sexual Activity    Alcohol use: Yes     Comment: socially    Drug use: No    Sexual activity: Yes     Partners: Male     Social Determinants of Health     Financial Resource Strain: Low Risk  (7/11/2023)    Overall Financial Resource Strain (CARDIA)     Difficulty of Paying Living Expenses: Not very hard   Food Insecurity: No Food Insecurity (7/11/2023)    Hunger Vital Sign     Worried About Running Out of Food in the Last Year: Never true     Ran Out of Food in the Last Year: Never true   Transportation Needs: No Transportation Needs (7/11/2023)    PRAPARE - Transportation     Lack of Transportation (Medical): No     Lack of Transportation (Non-Medical): No   Physical Activity: Unknown (7/11/2023)     Exercise Vital Sign     Days of Exercise per Week: Patient refused   Stress: Stress Concern Present (7/11/2023)    [place] [employer] - Occupational Stress Questionnaire     Feeling of Stress : To some extent   Social Connections: Unknown (7/11/2023)    Social Connection and Isolation Panel [NHANES]     Frequency of Communication with Friends and Family: More than three times a week     Frequency of Social Gatherings with Friends and Family: Twice a week     Active Member of Clubs or Organizations: Patient refused     Attends Club or Organization Meetings: Patient refused     Marital Status:    Housing Stability: High Risk (7/21/2022)    Housing Stability Vital Sign     Unable to Pay for Housing in the Last Year: No     Unstable Housing in the Last Year: Yes       Physical exam:  There were no vitals filed for this visit.  Body mass index is 23.52 kg/m².  General: In no apparent distress; well developed and well nourished.  HEENT: normocephalic; atraumatic.  Cardiovascular: regular rate.  Respiratory: no increased work of breathing.  Musculoskeletal:   Gait:  nonantalgic  Inspection:   No residual ankle swelling today.  No tenderness to palpation of the previously noted lateral malleolar fracture site.  Near full, painless ankle range of motion.  No medial based ankle swelling or tenderness at the deltoid.  No laxity with anterior drawer testing.    Silf[place]kiold:  Deferred  Alignment:  Knee: neutral               Ankle: neutral              Hindfoot: neutral              Forefoot: neutral   Strength:              Dorsiflexion 5/5  Plantar flexion 5/5  Inversion 5/5   Eversion 5/5   Sensation:             Good sensation on monofilament testing.  Pulses: 2+ DP/PT pulses.                   Imaging Studies/Outside documentation:  I have ordered/reviewed/interpreted the following images/outside documentation:  1. Weight bearing 3-views of Right ankle:   On my independent review, persistent  minimally displaced short spiral fracture of the lateral malleolus at the level of the syndesmosis, Quigley B. Approximately 2 mm of shortening, unchanged.  Interval callus formation and bony bridging are present today.  Ankle mortise remains congruent with weight-bearing stress x-rays.       Assessment:  Kylee Burgos is a 53 y.o. female with Minimally displaced Right lateral malleolus fracture, Quigley B.     Plan:   Clinical and radiographic findings were discussed.  We will transition from boot to lace-up ankle brace today.  Patient may continue to weightbear as tolerated in good supportive shoe wear.  She may complete her course of physical therapy.  No high impact activities-running, jumping, etc..  Patient voiced understanding.  All questions were answered.    We performed a custom orthotic/brace fitting, adjusting and training with the patient. The patient demonstrated understanding and proper care. This was performed for 15 minutes.      This note was created using voice recognition software and may contain grammatical errors.

## 2023-09-07 ENCOUNTER — PATIENT MESSAGE (OUTPATIENT)
Dept: FAMILY MEDICINE | Facility: CLINIC | Age: 53
End: 2023-09-07
Payer: COMMERCIAL

## 2023-09-08 ENCOUNTER — OFFICE VISIT (OUTPATIENT)
Dept: FAMILY MEDICINE | Facility: CLINIC | Age: 53
End: 2023-09-08
Payer: COMMERCIAL

## 2023-09-08 ENCOUNTER — HOSPITAL ENCOUNTER (OUTPATIENT)
Dept: RADIOLOGY | Facility: HOSPITAL | Age: 53
Discharge: HOME | End: 2023-09-08
Attending: FAMILY MEDICINE
Payer: COMMERCIAL

## 2023-09-08 VITALS
SYSTOLIC BLOOD PRESSURE: 120 MMHG | RESPIRATION RATE: 18 BRPM | HEART RATE: 97 BPM | HEIGHT: 64 IN | BODY MASS INDEX: 23.18 KG/M2 | WEIGHT: 135.81 LBS | OXYGEN SATURATION: 97 % | DIASTOLIC BLOOD PRESSURE: 74 MMHG

## 2023-09-08 DIAGNOSIS — S33.5XXA LUMBAR SPRAIN, INITIAL ENCOUNTER: ICD-10-CM

## 2023-09-08 DIAGNOSIS — S33.5XXA LUMBAR SPRAIN, INITIAL ENCOUNTER: Primary | ICD-10-CM

## 2023-09-08 DIAGNOSIS — Z00.00 WELLNESS EXAMINATION: ICD-10-CM

## 2023-09-08 PROCEDURE — 99999 PR PBB SHADOW E&M-EST. PATIENT-LVL V: ICD-10-PCS | Mod: PBBFAC,,, | Performed by: FAMILY MEDICINE

## 2023-09-08 PROCEDURE — 3066F NEPHROPATHY DOC TX: CPT | Mod: CPTII,S$GLB,, | Performed by: FAMILY MEDICINE

## 2023-09-08 PROCEDURE — 1160F RVW MEDS BY RX/DR IN RCRD: CPT | Mod: CPTII,S$GLB,, | Performed by: FAMILY MEDICINE

## 2023-09-08 PROCEDURE — 3074F SYST BP LT 130 MM HG: CPT | Mod: CPTII,S$GLB,, | Performed by: FAMILY MEDICINE

## 2023-09-08 PROCEDURE — 3044F HG A1C LEVEL LT 7.0%: CPT | Mod: CPTII,S$GLB,, | Performed by: FAMILY MEDICINE

## 2023-09-08 PROCEDURE — 1159F MED LIST DOCD IN RCRD: CPT | Mod: CPTII,S$GLB,, | Performed by: FAMILY MEDICINE

## 2023-09-08 PROCEDURE — 72114 XR LUMBAR SPINE 5 VIEW WITH FLEX AND EXT: ICD-10-PCS | Mod: 26,,, | Performed by: RADIOLOGY

## 2023-09-08 PROCEDURE — 4010F ACE/ARB THERAPY RXD/TAKEN: CPT | Mod: CPTII,S$GLB,, | Performed by: FAMILY MEDICINE

## 2023-09-08 PROCEDURE — 1160F PR REVIEW ALL MEDS BY PRESCRIBER/CLIN PHARMACIST DOCUMENTED: ICD-10-PCS | Mod: CPTII,S$GLB,, | Performed by: FAMILY MEDICINE

## 2023-09-08 PROCEDURE — 3078F PR MOST RECENT DIASTOLIC BLOOD PRESSURE < 80 MM HG: ICD-10-PCS | Mod: CPTII,S$GLB,, | Performed by: FAMILY MEDICINE

## 2023-09-08 PROCEDURE — 99999 PR PBB SHADOW E&M-EST. PATIENT-LVL V: CPT | Mod: PBBFAC,,, | Performed by: FAMILY MEDICINE

## 2023-09-08 PROCEDURE — 3060F PR POS MICROALBUMINURIA RESULT DOCUMENTED/REVIEW: ICD-10-PCS | Mod: CPTII,S$GLB,, | Performed by: FAMILY MEDICINE

## 2023-09-08 PROCEDURE — 99214 OFFICE O/P EST MOD 30 MIN: CPT | Mod: S$GLB,,, | Performed by: FAMILY MEDICINE

## 2023-09-08 PROCEDURE — 4010F PR ACE/ARB THEARPY RXD/TAKEN: ICD-10-PCS | Mod: CPTII,S$GLB,, | Performed by: FAMILY MEDICINE

## 2023-09-08 PROCEDURE — 99214 PR OFFICE/OUTPT VISIT, EST, LEVL IV, 30-39 MIN: ICD-10-PCS | Mod: S$GLB,,, | Performed by: FAMILY MEDICINE

## 2023-09-08 PROCEDURE — 72114 X-RAY EXAM L-S SPINE BENDING: CPT | Mod: TC,FY,PO

## 2023-09-08 PROCEDURE — 3008F PR BODY MASS INDEX (BMI) DOCUMENTED: ICD-10-PCS | Mod: CPTII,S$GLB,, | Performed by: FAMILY MEDICINE

## 2023-09-08 PROCEDURE — 3060F POS MICROALBUMINURIA REV: CPT | Mod: CPTII,S$GLB,, | Performed by: FAMILY MEDICINE

## 2023-09-08 PROCEDURE — 3078F DIAST BP <80 MM HG: CPT | Mod: CPTII,S$GLB,, | Performed by: FAMILY MEDICINE

## 2023-09-08 PROCEDURE — 3044F PR MOST RECENT HEMOGLOBIN A1C LEVEL <7.0%: ICD-10-PCS | Mod: CPTII,S$GLB,, | Performed by: FAMILY MEDICINE

## 2023-09-08 PROCEDURE — 1159F PR MEDICATION LIST DOCUMENTED IN MEDICAL RECORD: ICD-10-PCS | Mod: CPTII,S$GLB,, | Performed by: FAMILY MEDICINE

## 2023-09-08 PROCEDURE — 3066F PR DOCUMENTATION OF TREATMENT FOR NEPHROPATHY: ICD-10-PCS | Mod: CPTII,S$GLB,, | Performed by: FAMILY MEDICINE

## 2023-09-08 PROCEDURE — 72114 X-RAY EXAM L-S SPINE BENDING: CPT | Mod: 26,,, | Performed by: RADIOLOGY

## 2023-09-08 PROCEDURE — 3008F BODY MASS INDEX DOCD: CPT | Mod: CPTII,S$GLB,, | Performed by: FAMILY MEDICINE

## 2023-09-08 PROCEDURE — 3074F PR MOST RECENT SYSTOLIC BLOOD PRESSURE < 130 MM HG: ICD-10-PCS | Mod: CPTII,S$GLB,, | Performed by: FAMILY MEDICINE

## 2023-09-08 RX ORDER — IBUPROFEN 200 MG
200 TABLET ORAL EVERY 6 HOURS PRN
COMMUNITY

## 2023-09-08 RX ORDER — TIZANIDINE 4 MG/1
4 TABLET ORAL EVERY 6 HOURS PRN
Qty: 40 TABLET | Refills: 1 | Status: SHIPPED | OUTPATIENT
Start: 2023-09-08 | End: 2023-10-02

## 2023-09-08 NOTE — PROGRESS NOTES
"Subjective:       Patient ID: Kylee Burgos is a 53 y.o. female.    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  She is here today because of acute onset of low back pain pretty much on the left-hand side without radiculopathy.  No sensory or motor dysfunction noted.  She did not have an accident or injury.  She is just getting out of a boot for fractured fibula approximately 10 days ago.  There may have been something out of balance walking with the boot but it is unclear.  She is good bowel bladder control no sign of cauda equina syndrome.  Does not typically have low back pain on a regular basis.    Health maintenance issues were just briefly reviewed today and some immunizations recommended.  Review of Systems    Objective:      Vitals:    09/08/23 1529   BP: 120/74   Pulse: 97   Resp: 18   SpO2: 97%   Weight: 61.6 kg (135 lb 12.9 oz)   Height: 5' 4" (1.626 m)      Physical Exam    Results for orders placed or performed in visit on 07/07/23   TSH   Result Value Ref Range    TSH 1.410 0.400 - 4.000 uIU/mL   Hemoglobin A1C   Result Value Ref Range    Hemoglobin A1C 5.9 (H) 4.0 - 5.6 %    Estimated Avg Glucose 123 68 - 131 mg/dL   Vitamin B12   Result Value Ref Range    Vitamin B-12 308 210 - 950 pg/mL   Basic Metabolic Panel   Result Value Ref Range    Sodium 139 136 - 145 mmol/L    Potassium 4.0 3.5 - 5.1 mmol/L    Chloride 102 95 - 110 mmol/L    CO2 25 23 - 29 mmol/L    Glucose 109 70 - 110 mg/dL    BUN 13 6 - 20 mg/dL    Creatinine 0.7 0.5 - 1.4 mg/dL    Calcium 9.9 8.7 - 10.5 mg/dL    Anion Gap 12 8 - 16 mmol/L    eGFR >60.0 >60 mL/min/1.73 m^2   Vitamin D   Result Value Ref Range    Vit D, 25-Hydroxy 31 30 - 96 ng/mL      Assessment:       1. Lumbar sprain, initial encounter    2. Wellness examination        Plan:       Lumbar sprain, initial encounter  -     tiZANidine (ZANAFLEX) 4 MG tablet; Take 1 tablet (4 mg total) by mouth " "every 6 (six) hours as needed.  Dispense: 40 tablet; Refill: 1  -     X-Ray Lumbar Complete Including Flex And Ext; Future; Expected date: 09/08/2023    Wellness examination  -     Fecal Immunochemical Test (iFOBT); Future; Expected date: 09/08/2023          Medication List with Changes/Refills   New Medications    TIZANIDINE (ZANAFLEX) 4 MG TABLET    Take 1 tablet (4 mg total) by mouth every 6 (six) hours as needed.   Current Medications    BD INSULIN PEN NEEDLE UF MINI 31 GAUGE X 3/16" NDLE        BLOOD SUGAR DIAGNOSTIC STRP    To check BG 1 time daily, to use with insurance preferred meter    BLOOD-GLUCOSE METER KIT    To check BG 1 time daily, to use with insurance preferred meter    DULAGLUTIDE (TRULICITY) 0.75 MG/0.5 ML PEN INJECTOR    Inject 0.75 mg into the skin every 7 days.    ERGOCALCIFEROL (ERGOCALCIFEROL) 50,000 UNIT CAP    Take 1 capsule (50,000 Units total) by mouth every 7 days.    HYDROCHLOROTHIAZIDE (HYDRODIURIL) 12.5 MG TAB    Take 1 tablet (12.5 mg total) by mouth once daily.    IBUPROFEN (ADVIL,MOTRIN) 200 MG TABLET    Take 200 mg by mouth every 6 (six) hours as needed for Pain.    LANCETS 33 GAUGE MISC    1 lancet by Misc.(Non-Drug; Combo Route) route 4 (four) times daily.    LANCETS MISC    To check BG 1 time daily, to use with insurance preferred meter    LISINOPRIL (PRINIVIL,ZESTRIL) 40 MG TABLET    Take 1 tablet (40 mg total) by mouth once daily.    METFORMIN (GLUCOPHAGE-XR) 500 MG ER 24HR TABLET    Take 2 tablets (1,000 mg total) by mouth 2 (two) times daily with meals.    NAPROXEN (NAPROSYN) 500 MG TABLET    Take 1 tablet (500 mg total) by mouth 2 (two) times daily with meals.    ONDANSETRON (ZOFRAN-ODT) 8 MG TBDL    Take 1 tablet (8 mg total) by mouth every 8 (eight) hours as needed (for nausea).    ONETOUCH VERIO TEST STRIPS STRP    USE TO TEST ONCE DAILY    PRAVASTATIN (PRAVACHOL) 20 MG TABLET    Take 1 tablet (20 mg total) by mouth every evening.   Discontinued Medications    " DULOXETINE (CYMBALTA) 60 MG CAPSULE    Take 1 capsule (60 mg total) by mouth once daily.    HYDROCODONE-ACETAMINOPHEN (NORCO) 5-325 MG PER TABLET    Take 1 tablet by mouth every 6 (six) hours as needed for Pain.

## 2023-09-08 NOTE — PATIENT INSTRUCTIONS
Ask your pharmacist about the pneumococcal PCV 20 vaccine, the COVID-19 vaccine series completion total of 5 shots available now, Tdap tetanus booster, flu shot and shingles.

## 2023-09-12 ENCOUNTER — PATIENT MESSAGE (OUTPATIENT)
Dept: FAMILY MEDICINE | Facility: CLINIC | Age: 53
End: 2023-09-12
Payer: COMMERCIAL

## 2023-09-18 ENCOUNTER — TELEPHONE (OUTPATIENT)
Dept: REHABILITATION | Facility: HOSPITAL | Age: 53
End: 2023-09-18
Payer: COMMERCIAL

## 2023-09-30 DIAGNOSIS — S33.5XXA LUMBAR SPRAIN, INITIAL ENCOUNTER: ICD-10-CM

## 2023-09-30 NOTE — TELEPHONE ENCOUNTER
No care due was identified.  [facility] Embedded Care Due Messages. Reference number: [id number].   9/30/2023 3:34:39 AM CDT

## 2023-10-02 RX ORDER — TIZANIDINE 4 MG/1
4 TABLET ORAL EVERY 6 HOURS PRN
Qty: 40 TABLET | Refills: 1 | Status: SHIPPED | OUTPATIENT
Start: 2023-10-02 | End: 2024-02-19

## 2023-10-06 DIAGNOSIS — S82.61XA DISPLACED FRACTURE OF LATERAL MALLEOLUS OF RIGHT FIBULA, INITIAL ENCOUNTER FOR CLOSED FRACTURE: Primary | ICD-10-CM

## 2023-10-10 ENCOUNTER — TELEPHONE (OUTPATIENT)
Dept: ORTHOPEDICS | Facility: CLINIC | Age: 53
End: 2023-10-10
Payer: COMMERCIAL

## 2023-10-16 ENCOUNTER — PATIENT MESSAGE (OUTPATIENT)
Dept: FAMILY MEDICINE | Facility: CLINIC | Age: 53
End: 2023-10-16
Payer: COMMERCIAL

## 2023-11-03 ENCOUNTER — PATIENT MESSAGE (OUTPATIENT)
Dept: FAMILY MEDICINE | Facility: CLINIC | Age: 53
End: 2023-11-03
Payer: COMMERCIAL

## 2023-11-03 DIAGNOSIS — I10 ESSENTIAL HYPERTENSION: ICD-10-CM

## 2023-11-16 ENCOUNTER — PATIENT MESSAGE (OUTPATIENT)
Dept: FAMILY MEDICINE | Facility: CLINIC | Age: 53
End: 2023-11-16

## 2023-11-16 ENCOUNTER — OFFICE VISIT (OUTPATIENT)
Dept: FAMILY MEDICINE | Facility: CLINIC | Age: 53
End: 2023-11-16
Payer: COMMERCIAL

## 2023-11-16 VITALS
BODY MASS INDEX: 22.52 KG/M2 | WEIGHT: 131.19 LBS | TEMPERATURE: 98 F | SYSTOLIC BLOOD PRESSURE: 128 MMHG | HEART RATE: 105 BPM | DIASTOLIC BLOOD PRESSURE: 82 MMHG | OXYGEN SATURATION: 96 %

## 2023-11-16 DIAGNOSIS — J06.9 VIRAL URI WITH COUGH: Primary | ICD-10-CM

## 2023-11-16 LAB
CTP QC/QA: YES
CTP QC/QA: YES
POC MOLECULAR INFLUENZA A AGN: NEGATIVE
POC MOLECULAR INFLUENZA B AGN: NEGATIVE
SARS-COV-2 RDRP RESP QL NAA+PROBE: NEGATIVE

## 2023-11-16 PROCEDURE — 3008F PR BODY MASS INDEX (BMI) DOCUMENTED: ICD-10-PCS | Mod: CPTII,S$GLB,, | Performed by: STUDENT IN AN ORGANIZED HEALTH CARE EDUCATION/TRAINING PROGRAM

## 2023-11-16 PROCEDURE — 3074F SYST BP LT 130 MM HG: CPT | Mod: CPTII,S$GLB,, | Performed by: STUDENT IN AN ORGANIZED HEALTH CARE EDUCATION/TRAINING PROGRAM

## 2023-11-16 PROCEDURE — 3066F PR DOCUMENTATION OF TREATMENT FOR NEPHROPATHY: ICD-10-PCS | Mod: CPTII,S$GLB,, | Performed by: STUDENT IN AN ORGANIZED HEALTH CARE EDUCATION/TRAINING PROGRAM

## 2023-11-16 PROCEDURE — 3066F NEPHROPATHY DOC TX: CPT | Mod: CPTII,S$GLB,, | Performed by: STUDENT IN AN ORGANIZED HEALTH CARE EDUCATION/TRAINING PROGRAM

## 2023-11-16 PROCEDURE — 3060F PR POS MICROALBUMINURIA RESULT DOCUMENTED/REVIEW: ICD-10-PCS | Mod: CPTII,S$GLB,, | Performed by: STUDENT IN AN ORGANIZED HEALTH CARE EDUCATION/TRAINING PROGRAM

## 2023-11-16 PROCEDURE — 87635 SARS-COV-2 COVID-19 AMP PRB: CPT | Mod: QW,S$GLB,, | Performed by: STUDENT IN AN ORGANIZED HEALTH CARE EDUCATION/TRAINING PROGRAM

## 2023-11-16 PROCEDURE — 3079F DIAST BP 80-89 MM HG: CPT | Mod: CPTII,S$GLB,, | Performed by: STUDENT IN AN ORGANIZED HEALTH CARE EDUCATION/TRAINING PROGRAM

## 2023-11-16 PROCEDURE — 3079F PR MOST RECENT DIASTOLIC BLOOD PRESSURE 80-89 MM HG: ICD-10-PCS | Mod: CPTII,S$GLB,, | Performed by: STUDENT IN AN ORGANIZED HEALTH CARE EDUCATION/TRAINING PROGRAM

## 2023-11-16 PROCEDURE — 1159F PR MEDICATION LIST DOCUMENTED IN MEDICAL RECORD: ICD-10-PCS | Mod: CPTII,S$GLB,, | Performed by: STUDENT IN AN ORGANIZED HEALTH CARE EDUCATION/TRAINING PROGRAM

## 2023-11-16 PROCEDURE — 3060F POS MICROALBUMINURIA REV: CPT | Mod: CPTII,S$GLB,, | Performed by: STUDENT IN AN ORGANIZED HEALTH CARE EDUCATION/TRAINING PROGRAM

## 2023-11-16 PROCEDURE — 3044F HG A1C LEVEL LT 7.0%: CPT | Mod: CPTII,S$GLB,, | Performed by: STUDENT IN AN ORGANIZED HEALTH CARE EDUCATION/TRAINING PROGRAM

## 2023-11-16 PROCEDURE — 4010F ACE/ARB THERAPY RXD/TAKEN: CPT | Mod: CPTII,S$GLB,, | Performed by: STUDENT IN AN ORGANIZED HEALTH CARE EDUCATION/TRAINING PROGRAM

## 2023-11-16 PROCEDURE — 4010F PR ACE/ARB THEARPY RXD/TAKEN: ICD-10-PCS | Mod: CPTII,S$GLB,, | Performed by: STUDENT IN AN ORGANIZED HEALTH CARE EDUCATION/TRAINING PROGRAM

## 2023-11-16 PROCEDURE — 87635: ICD-10-PCS | Mod: QW,S$GLB,, | Performed by: STUDENT IN AN ORGANIZED HEALTH CARE EDUCATION/TRAINING PROGRAM

## 2023-11-16 PROCEDURE — 87502 INFLUENZA DNA AMP PROBE: CPT | Mod: QW,S$GLB,, | Performed by: STUDENT IN AN ORGANIZED HEALTH CARE EDUCATION/TRAINING PROGRAM

## 2023-11-16 PROCEDURE — 3044F PR MOST RECENT HEMOGLOBIN A1C LEVEL <7.0%: ICD-10-PCS | Mod: CPTII,S$GLB,, | Performed by: STUDENT IN AN ORGANIZED HEALTH CARE EDUCATION/TRAINING PROGRAM

## 2023-11-16 PROCEDURE — 1159F MED LIST DOCD IN RCRD: CPT | Mod: CPTII,S$GLB,, | Performed by: STUDENT IN AN ORGANIZED HEALTH CARE EDUCATION/TRAINING PROGRAM

## 2023-11-16 PROCEDURE — 99999 PR PBB SHADOW E&M-EST. PATIENT-LVL IV: ICD-10-PCS | Mod: PBBFAC,,, | Performed by: STUDENT IN AN ORGANIZED HEALTH CARE EDUCATION/TRAINING PROGRAM

## 2023-11-16 PROCEDURE — 99999 PR PBB SHADOW E&M-EST. PATIENT-LVL IV: CPT | Mod: PBBFAC,,, | Performed by: STUDENT IN AN ORGANIZED HEALTH CARE EDUCATION/TRAINING PROGRAM

## 2023-11-16 PROCEDURE — 99213 PR OFFICE/OUTPT VISIT, EST, LEVL III, 20-29 MIN: ICD-10-PCS | Mod: S$GLB,,, | Performed by: STUDENT IN AN ORGANIZED HEALTH CARE EDUCATION/TRAINING PROGRAM

## 2023-11-16 PROCEDURE — 99213 OFFICE O/P EST LOW 20 MIN: CPT | Mod: S$GLB,,, | Performed by: STUDENT IN AN ORGANIZED HEALTH CARE EDUCATION/TRAINING PROGRAM

## 2023-11-16 PROCEDURE — 87502 POCT INFLUENZA A/B MOLECULAR: ICD-10-PCS | Mod: QW,S$GLB,, | Performed by: STUDENT IN AN ORGANIZED HEALTH CARE EDUCATION/TRAINING PROGRAM

## 2023-11-16 PROCEDURE — 3074F PR MOST RECENT SYSTOLIC BLOOD PRESSURE < 130 MM HG: ICD-10-PCS | Mod: CPTII,S$GLB,, | Performed by: STUDENT IN AN ORGANIZED HEALTH CARE EDUCATION/TRAINING PROGRAM

## 2023-11-16 PROCEDURE — 3008F BODY MASS INDEX DOCD: CPT | Mod: CPTII,S$GLB,, | Performed by: STUDENT IN AN ORGANIZED HEALTH CARE EDUCATION/TRAINING PROGRAM

## 2023-11-16 RX ORDER — PREDNISONE 20 MG/1
20 TABLET ORAL DAILY
Qty: 5 TABLET | Refills: 0 | Status: SHIPPED | OUTPATIENT
Start: 2023-11-16 | End: 2023-11-21

## 2023-11-16 NOTE — PATIENT INSTRUCTIONS
Use anti-histamine medications (such as Claritin, Zyrtec, Allegra, or Xyzal) or anti-histamine nasal sprays (such as Astelin), fluticasone nasal spray (such as Flonase), and/or nasal saline rinses to treat your sinus congestion and post nasal drip.    Let us know if your symptoms suddenly get worse after improving, if you develop fever (100.4 F), if you develop chest pain or sinus pain, or if your symptoms last longer than ten days.

## 2023-11-16 NOTE — PROGRESS NOTES
Name: Kylee Burgos  MRN: [id number]  : 1970  PCP: Hector Gallardo MD    HPI  Patient presents with symptoms as listed below. This started . She has tried OTC Coricidin with no real improvement.     Review of Systems   Constitutional:  Negative for chills and fever.   HENT:  Positive for congestion and rhinorrhea. Negative for sinus pain and sore throat.    Respiratory:  Positive for cough (sometimes productive).    Cardiovascular:  Positive for chest pain (with cough).   Musculoskeletal:  Negative for arthralgias.   Neurological:  Positive for headaches.       Patient Active Problem List   Diagnosis    Multiple joint pain    Hand numbness    Vitamin D deficiency disease    Fibromyalgia    Anxiety    Essential hypertension    Type 2 diabetes mellitus with diabetic neuropathy, without long-term current use of insulin    Mixed hyperlipidemia    Antalgic gait    Decreased range of motion of right ankle    Weakness of right lower extremity       Health Maintenance Due   Topic Date Due    Pneumococcal Vaccines (Age 0-64) (1 - PCV) Never done    TETANUS VACCINE  Never done    Colorectal Cancer Screening  Never done    Shingles Vaccine (1 of 2) Never done    Influenza Vaccine (1) Never done    COVID-19 Vaccine (3 - - season) 2023    Foot Exam  2024    Eye Exam  2024       Vitals:    23 1047   BP: 128/82   Pulse: 105   Temp: 98.2 °F (36.8 °C)       Physical Exam  Constitutional:       General: She is not in acute distress.     Appearance: Normal appearance. She is well-developed.   HENT:      Head: Normocephalic and atraumatic.      Right Ear: External ear normal.      Left Ear: External ear normal.      Nose: Mucosal edema and rhinorrhea present. Rhinorrhea is clear.      Mouth/Throat:      Pharynx: No pharyngeal swelling or posterior oropharyngeal erythema.   Eyes:      Conjunctiva/sclera: Conjunctivae normal.   Cardiovascular:      Rate and Rhythm: Normal rate and regular  rhythm.      Heart sounds: No murmur heard.     No friction rub. No gallop.   Pulmonary:      Effort: Pulmonary effort is normal. No respiratory distress.      Breath sounds: No wheezing, rhonchi or rales.   Abdominal:      General: Abdomen is flat. There is no distension.   Musculoskeletal:         General: No swelling or deformity.      Right lower leg: No edema.      Left lower leg: No edema.   Skin:     General: Skin is warm and dry.      Coloration: Skin is not jaundiced.   Neurological:      Mental Status: She is alert and oriented to person, place, and time. Mental status is at baseline.   Psychiatric:         Attention and Perception: Attention and perception normal.         Mood and Affect: Mood normal.         Speech: Speech normal.         Behavior: Behavior normal. Behavior is cooperative.         Thought Content: Thought content normal.         Cognition and Memory: Cognition normal.         Judgment: Judgment normal.         1. Viral URI with cough  -     POCT Influenza A/B Molecular  -     POCT COVID-19 Rapid Screening  -     predniSONE (DELTASONE) 20 MG tablet; Take 1 tablet (20 mg total) by mouth once daily. for 5 days  Dispense: 5 tablet; Refill: 0    Recommending nasal saline rinses, nasal steroid spray, and anti-allergy medications. Return precautions given.     Follow up as needed.     Hector Gallardo MD  11/16/2023

## 2023-12-08 LAB — BCS RECOMMENDATION EXT: NORMAL

## 2023-12-13 DIAGNOSIS — E55.9 VITAMIN D DEFICIENCY DISEASE: ICD-10-CM

## 2023-12-13 RX ORDER — ERGOCALCIFEROL 1.25 MG/1
50000 CAPSULE ORAL
Qty: 12 CAPSULE | Refills: 1 | Status: SHIPPED | OUTPATIENT
Start: 2023-12-13

## 2023-12-27 ENCOUNTER — PATIENT MESSAGE (OUTPATIENT)
Dept: FAMILY MEDICINE | Facility: CLINIC | Age: 53
End: 2023-12-27
Payer: COMMERCIAL

## 2023-12-27 DIAGNOSIS — E11.40 TYPE 2 DIABETES MELLITUS WITH DIABETIC NEUROPATHY, WITHOUT LONG-TERM CURRENT USE OF INSULIN: ICD-10-CM

## 2023-12-27 DIAGNOSIS — I10 ESSENTIAL HYPERTENSION: ICD-10-CM

## 2023-12-27 DIAGNOSIS — E78.2 MIXED HYPERLIPIDEMIA: Primary | ICD-10-CM

## 2024-01-12 ENCOUNTER — DOCUMENTATION ONLY (OUTPATIENT)
Dept: REHABILITATION | Facility: HOSPITAL | Age: 54
End: 2024-01-12
Payer: COMMERCIAL

## 2024-01-12 NOTE — PROGRESS NOTES
[facility]  Outpatient Physical Therapy Discharge Note    [facility]  [facility]inic Number: [id number]    Therapy Diagnosis:        Encounter Diagnoses   Name Primary?    Antalgic gait Yes    Decreased range of motion of right ankle      Weakness of right lower extremity           Physician: Danny Gregory MD     Visit Date: 8/24/2023        Physician Orders: Evaluate and Treat  Medical Diagnosis from Referral: S82.61XA (ICD-10-CM) - Displaced fracture of lateral malleolus of right fibula, initial encounter for closed fracture  Evaluation Date: 8/9/2023    Date of Last visit: 8/24/23  Total Visits Received: 4  Cancelled Visits: 0  No Show Visits: 0    Assessment    Pt no show to remaining physical therapy appointments     Goals: n/a     Discharge reason: Patient has not attended therapy since 8/24/    Plan   This patient is discharged from Physical Therapy

## 2024-01-17 DIAGNOSIS — E11.9 TYPE 2 DIABETES MELLITUS WITHOUT COMPLICATION: ICD-10-CM

## 2024-01-23 ENCOUNTER — PATIENT MESSAGE (OUTPATIENT)
Dept: ADMINISTRATIVE | Facility: HOSPITAL | Age: 54
End: 2024-01-23
Payer: COMMERCIAL

## 2024-02-09 ENCOUNTER — PATIENT MESSAGE (OUTPATIENT)
Dept: FAMILY MEDICINE | Facility: CLINIC | Age: 54
End: 2024-02-09
Payer: COMMERCIAL

## 2024-02-15 ENCOUNTER — LAB VISIT (OUTPATIENT)
Dept: LAB | Facility: HOSPITAL | Age: 54
End: 2024-02-15
Attending: STUDENT IN AN ORGANIZED HEALTH CARE EDUCATION/TRAINING PROGRAM
Payer: COMMERCIAL

## 2024-02-15 DIAGNOSIS — E11.9 TYPE 2 DIABETES MELLITUS WITHOUT COMPLICATION: ICD-10-CM

## 2024-02-15 DIAGNOSIS — E11.40 TYPE 2 DIABETES MELLITUS WITH DIABETIC NEUROPATHY, WITHOUT LONG-TERM CURRENT USE OF INSULIN: ICD-10-CM

## 2024-02-15 DIAGNOSIS — I10 ESSENTIAL HYPERTENSION: ICD-10-CM

## 2024-02-15 LAB
ALBUMIN SERPL BCP-MCNC: 3.5 G/DL (ref 3.5–5.2)
ALP SERPL-CCNC: 76 U/L (ref 55–135)
ALT SERPL W/O P-5'-P-CCNC: 19 U/L (ref 10–44)
ANION GAP SERPL CALC-SCNC: 9 MMOL/L (ref 8–16)
AST SERPL-CCNC: 16 U/L (ref 10–40)
BILIRUB SERPL-MCNC: 0.2 MG/DL (ref 0.1–1)
BUN SERPL-MCNC: 13 MG/DL (ref 6–20)
CALCIUM SERPL-MCNC: 9.8 MG/DL (ref 8.7–10.5)
CHLORIDE SERPL-SCNC: 107 MMOL/L (ref 95–110)
CHOLEST SERPL-MCNC: 166 MG/DL (ref 120–199)
CHOLEST/HDLC SERPL: 3.3 {RATIO} (ref 2–5)
CO2 SERPL-SCNC: 26 MMOL/L (ref 23–29)
CREAT SERPL-MCNC: 0.7 MG/DL (ref 0.5–1.4)
EST. GFR  (NO RACE VARIABLE): >60 ML/MIN/1.73 M^2
ESTIMATED AVG GLUCOSE: 120 MG/DL (ref 68–131)
GLUCOSE SERPL-MCNC: 100 MG/DL (ref 70–110)
HBA1C MFR BLD: 5.8 % (ref 4–5.6)
HDLC SERPL-MCNC: 50 MG/DL (ref 40–75)
HDLC SERPL: 30.1 % (ref 20–50)
LDLC SERPL CALC-MCNC: 99.6 MG/DL (ref 63–159)
NONHDLC SERPL-MCNC: 116 MG/DL
POTASSIUM SERPL-SCNC: 4.1 MMOL/L (ref 3.5–5.1)
PROT SERPL-MCNC: 6.8 G/DL (ref 6–8.4)
SODIUM SERPL-SCNC: 142 MMOL/L (ref 136–145)
TRIGL SERPL-MCNC: 82 MG/DL (ref 30–150)

## 2024-02-15 PROCEDURE — 80061 LIPID PANEL: CPT | Performed by: STUDENT IN AN ORGANIZED HEALTH CARE EDUCATION/TRAINING PROGRAM

## 2024-02-15 PROCEDURE — 36415 COLL VENOUS BLD VENIPUNCTURE: CPT | Mod: PO | Performed by: STUDENT IN AN ORGANIZED HEALTH CARE EDUCATION/TRAINING PROGRAM

## 2024-02-15 PROCEDURE — 80053 COMPREHEN METABOLIC PANEL: CPT | Performed by: STUDENT IN AN ORGANIZED HEALTH CARE EDUCATION/TRAINING PROGRAM

## 2024-02-15 PROCEDURE — 83036 HEMOGLOBIN GLYCOSYLATED A1C: CPT | Performed by: STUDENT IN AN ORGANIZED HEALTH CARE EDUCATION/TRAINING PROGRAM

## 2024-02-19 ENCOUNTER — OFFICE VISIT (OUTPATIENT)
Dept: FAMILY MEDICINE | Facility: CLINIC | Age: 54
End: 2024-02-19
Payer: COMMERCIAL

## 2024-02-19 VITALS
SYSTOLIC BLOOD PRESSURE: 134 MMHG | HEIGHT: 64 IN | BODY MASS INDEX: 21.94 KG/M2 | WEIGHT: 128.5 LBS | OXYGEN SATURATION: 97 % | HEART RATE: 89 BPM | DIASTOLIC BLOOD PRESSURE: 86 MMHG

## 2024-02-19 DIAGNOSIS — E11.40 TYPE 2 DIABETES MELLITUS WITH DIABETIC NEUROPATHY, WITHOUT LONG-TERM CURRENT USE OF INSULIN: ICD-10-CM

## 2024-02-19 DIAGNOSIS — I10 ESSENTIAL HYPERTENSION: Primary | ICD-10-CM

## 2024-02-19 DIAGNOSIS — J06.9 VIRAL URI WITH COUGH: ICD-10-CM

## 2024-02-19 LAB
ALBUMIN/CREAT UR: 71.1 UG/MG (ref 0–30)
CREAT UR-MCNC: 38 MG/DL (ref 15–325)
MICROALBUMIN UR DL<=1MG/L-MCNC: 27 UG/ML

## 2024-02-19 PROCEDURE — 3008F BODY MASS INDEX DOCD: CPT | Mod: CPTII,S$GLB,, | Performed by: STUDENT IN AN ORGANIZED HEALTH CARE EDUCATION/TRAINING PROGRAM

## 2024-02-19 PROCEDURE — 3075F SYST BP GE 130 - 139MM HG: CPT | Mod: CPTII,S$GLB,, | Performed by: STUDENT IN AN ORGANIZED HEALTH CARE EDUCATION/TRAINING PROGRAM

## 2024-02-19 PROCEDURE — 82043 UR ALBUMIN QUANTITATIVE: CPT | Performed by: STUDENT IN AN ORGANIZED HEALTH CARE EDUCATION/TRAINING PROGRAM

## 2024-02-19 PROCEDURE — 3072F LOW RISK FOR RETINOPATHY: CPT | Mod: CPTII,S$GLB,, | Performed by: STUDENT IN AN ORGANIZED HEALTH CARE EDUCATION/TRAINING PROGRAM

## 2024-02-19 PROCEDURE — 99999 PR PBB SHADOW E&M-EST. PATIENT-LVL V: CPT | Mod: PBBFAC,,, | Performed by: STUDENT IN AN ORGANIZED HEALTH CARE EDUCATION/TRAINING PROGRAM

## 2024-02-19 PROCEDURE — 3079F DIAST BP 80-89 MM HG: CPT | Mod: CPTII,S$GLB,, | Performed by: STUDENT IN AN ORGANIZED HEALTH CARE EDUCATION/TRAINING PROGRAM

## 2024-02-19 PROCEDURE — 99214 OFFICE O/P EST MOD 30 MIN: CPT | Mod: S$GLB,,, | Performed by: STUDENT IN AN ORGANIZED HEALTH CARE EDUCATION/TRAINING PROGRAM

## 2024-02-19 PROCEDURE — 3044F HG A1C LEVEL LT 7.0%: CPT | Mod: CPTII,S$GLB,, | Performed by: STUDENT IN AN ORGANIZED HEALTH CARE EDUCATION/TRAINING PROGRAM

## 2024-02-19 PROCEDURE — 4010F ACE/ARB THERAPY RXD/TAKEN: CPT | Mod: CPTII,S$GLB,, | Performed by: STUDENT IN AN ORGANIZED HEALTH CARE EDUCATION/TRAINING PROGRAM

## 2024-02-19 PROCEDURE — 1159F MED LIST DOCD IN RCRD: CPT | Mod: CPTII,S$GLB,, | Performed by: STUDENT IN AN ORGANIZED HEALTH CARE EDUCATION/TRAINING PROGRAM

## 2024-02-19 RX ORDER — DULAGLUTIDE 0.75 MG/.5ML
INJECTION, SOLUTION SUBCUTANEOUS
COMMUNITY
End: 2024-03-06

## 2024-02-19 RX ORDER — PREDNISONE 20 MG/1
20 TABLET ORAL DAILY
Qty: 5 TABLET | Refills: 0 | Status: SHIPPED | OUTPATIENT
Start: 2024-02-19 | End: 2024-02-24

## 2024-02-19 NOTE — PROGRESS NOTES
Name: Kylee Burgos  MRN: [id number]  : 1970  PCP: Hector Gallardo MD    Subjective   Patient presents for regular follow up.     had upper respiratory symptoms over the weekend and now she has developed symptoms.    Review of Systems   HENT:  Positive for congestion.    Eyes:  Negative for visual disturbance.   Respiratory:  Positive for cough. Negative for shortness of breath.    Cardiovascular:  Negative for chest pain.   Gastrointestinal:  Negative for nausea and vomiting.       Patient Active Problem List   Diagnosis    Multiple joint pain    Hand numbness    Vitamin D deficiency disease    Fibromyalgia    Anxiety    Essential hypertension    Type 2 diabetes mellitus with diabetic neuropathy, without long-term current use of insulin    Mixed hyperlipidemia    Antalgic gait    Decreased range of motion of right ankle    Weakness of right lower extremity       Health Maintenance Due   Topic Date Due    Pneumococcal Vaccines (Age 0-64) (1 of 2 - PCV) Never done    TETANUS VACCINE  Never done    Colorectal Cancer Screening  Never done    Shingles Vaccine (1 of 2) Never done    Influenza Vaccine (1) Never done    COVID-19 Vaccine (3 -  season) 2023    Foot Exam  2024    Eye Exam  2024    Diabetes Urine Screening  01/10/2024       Objective   Vitals:    24 0900   BP: 134/86   Pulse: 89       Physical Exam  Constitutional:       General: She is not in acute distress.     Appearance: Normal appearance. She is well-developed.   HENT:      Head: Normocephalic and atraumatic.      Right Ear: External ear normal.      Left Ear: External ear normal.   Eyes:      Conjunctiva/sclera: Conjunctivae normal.   Pulmonary:      Effort: Pulmonary effort is normal. No respiratory distress.   Abdominal:      General: Abdomen is flat. There is no distension.   Musculoskeletal:         General: No swelling or deformity.      Right lower leg: No edema.      Left lower leg: No edema.    Skin:     General: Skin is warm and dry.      Coloration: Skin is not jaundiced.   Neurological:      Mental Status: She is alert and oriented to person, place, and time. Mental status is at baseline.   Psychiatric:         Attention and Perception: Attention and perception normal.         Mood and Affect: Mood normal.         Speech: Speech normal.         Behavior: Behavior normal. Behavior is cooperative.         Thought Content: Thought content normal.         Cognition and Memory: Cognition normal.         Judgment: Judgment normal.         Protective Sensation (w/ 10 gram monofilament):  Right: Intact  Left: Intact    Visual Inspection:  Normal -  Bilateral    Pedal Pulses:   Right: Present  Left: Present    Posterior Tibialis Pulses:   Right:Present  Left: Present      Assessment & Plan   1. Essential hypertension  Assessment & Plan:  Well controlled. Continue lisinopril and hydrochlorothiazide.      2. Type 2 diabetes mellitus with diabetic neuropathy, without long-term current use of insulin  Assessment & Plan:  A1C last week was 5.8. Continue dulaglutide and metformin. Encouraged to schedule eye exam.    She mentioned a bout of feeling spontaneously ill that correlated with a blood sugar of 80. This happened one other time but hasn't happened in several months. Her body may be adjusting to lower sugar levels. Continue to monitor.    Orders:  -     Microalbumin/Creatinine Ratio, Urine    3. Viral URI with cough  -     predniSONE (DELTASONE) 20 MG tablet; Take 1 tablet (20 mg total) by mouth once daily. for 5 days  Dispense: 5 tablet; Refill: 0        Follow up in 6 months.     Hector Gallardo MD  02/19/2024

## 2024-02-19 NOTE — ASSESSMENT & PLAN NOTE
A1C last week was 5.8. Continue dulaglutide and metformin. Encouraged to schedule eye exam.    She mentioned a bout of feeling spontaneously ill that correlated with a blood sugar of 80. This happened one other time but hasn't happened in several months. Her body may be adjusting to lower sugar levels. Continue to monitor.

## 2024-02-28 ENCOUNTER — PATIENT MESSAGE (OUTPATIENT)
Dept: FAMILY MEDICINE | Facility: CLINIC | Age: 54
End: 2024-02-28
Payer: COMMERCIAL

## 2024-02-28 RX ORDER — PROMETHAZINE HYDROCHLORIDE AND DEXTROMETHORPHAN HYDROBROMIDE 6.25; 15 MG/5ML; MG/5ML
5 SYRUP ORAL EVERY 4 HOURS PRN
Qty: 240 ML | Refills: 0 | Status: SHIPPED | OUTPATIENT
Start: 2024-02-28 | End: 2024-03-09

## 2024-03-06 DIAGNOSIS — E11.40 TYPE 2 DIABETES MELLITUS WITH DIABETIC NEUROPATHY, WITHOUT LONG-TERM CURRENT USE OF INSULIN: Primary | ICD-10-CM

## 2024-03-06 DIAGNOSIS — I10 ESSENTIAL HYPERTENSION: ICD-10-CM

## 2024-03-06 RX ORDER — DULAGLUTIDE 0.75 MG/.5ML
INJECTION, SOLUTION SUBCUTANEOUS
Qty: 4 PEN | Refills: 6 | Status: SHIPPED | OUTPATIENT
Start: 2024-03-06 | End: 2024-03-06 | Stop reason: SDUPTHER

## 2024-03-06 RX ORDER — DULAGLUTIDE 0.75 MG/.5ML
INJECTION, SOLUTION SUBCUTANEOUS
Qty: 4 PEN | Refills: 6 | Status: SHIPPED | OUTPATIENT
Start: 2024-03-06

## 2024-03-06 RX ORDER — HYDROCHLOROTHIAZIDE 12.5 MG/1
12.5 TABLET ORAL DAILY
Qty: 90 TABLET | Refills: 3 | Status: SHIPPED | OUTPATIENT
Start: 2024-03-06 | End: 2024-04-23 | Stop reason: SDUPTHER

## 2024-03-06 NOTE — TELEPHONE ENCOUNTER
No care due was identified.  [facility] Embedded Care Due Messages. Reference number: [id number].   3/06/2024 2:33:33 PM CST

## 2024-03-11 ENCOUNTER — PATIENT OUTREACH (OUTPATIENT)
Dept: ADMINISTRATIVE | Facility: HOSPITAL | Age: 54
End: 2024-03-11
Payer: COMMERCIAL

## 2024-03-11 ENCOUNTER — PATIENT MESSAGE (OUTPATIENT)
Dept: ADMINISTRATIVE | Facility: HOSPITAL | Age: 54
End: 2024-03-11
Payer: COMMERCIAL

## 2024-03-11 NOTE — PROGRESS NOTES
Population Health Chart Review & Patient Outreach Details      Additional [facility] Health Notes:               Updates Requested / Reviewed:      Updated Care Coordination Note         Health Maintenance Topics Overdue:      VB Score: 3     Colon Cancer Screening  Eye Exam  Mammogram                       Health Maintenance Topic(s) Outreach Outcomes & Actions Taken:    Breast Cancer Screening - Outreach Outcomes & Actions Taken  : outreached to schedule, lvm portal sent

## 2024-03-29 NOTE — TELEPHONE ENCOUNTER
If not prev tried gabapentin-could start 300mg hs and let me know-if the HA med isn't working, best to stop taking.  If not better by next week OV   S/P aortic valve replacement with bioprosthetic valve

## 2024-04-23 ENCOUNTER — PATIENT MESSAGE (OUTPATIENT)
Dept: FAMILY MEDICINE | Facility: CLINIC | Age: 54
End: 2024-04-23
Payer: COMMERCIAL

## 2024-04-23 DIAGNOSIS — I10 ESSENTIAL HYPERTENSION: ICD-10-CM

## 2024-04-23 RX ORDER — LISINOPRIL 40 MG/1
40 TABLET ORAL
Qty: 90 TABLET | Refills: 3 | Status: CANCELLED | OUTPATIENT
Start: 2024-04-23

## 2024-04-23 RX ORDER — HYDROCHLOROTHIAZIDE 12.5 MG/1
12.5 TABLET ORAL DAILY
Qty: 90 TABLET | Refills: 3 | Status: SHIPPED | OUTPATIENT
Start: 2024-04-23 | End: 2024-05-21

## 2024-04-23 RX ORDER — LISINOPRIL 40 MG/1
40 TABLET ORAL
Qty: 90 TABLET | Refills: 3 | Status: SHIPPED | OUTPATIENT
Start: 2024-04-23

## 2024-04-23 NOTE — TELEPHONE ENCOUNTER
No care due was identified.  [facility] Embedded Care Due Messages. Reference number: [id number].   4/23/2024 4:22:20 PM CDT

## 2024-04-23 NOTE — TELEPHONE ENCOUNTER
Medication originally prescribed by FAYE Caceres, but patient has been seeing Dr Gallardo. Please advise

## 2024-04-29 ENCOUNTER — PATIENT OUTREACH (OUTPATIENT)
Dept: ADMINISTRATIVE | Facility: HOSPITAL | Age: 54
End: 2024-04-29
Payer: COMMERCIAL

## 2024-04-29 ENCOUNTER — PATIENT MESSAGE (OUTPATIENT)
Dept: ADMINISTRATIVE | Facility: HOSPITAL | Age: 54
End: 2024-04-29
Payer: COMMERCIAL

## 2024-04-29 NOTE — PROGRESS NOTES
Population Health Chart Review & Patient Outreach Details      Additional [facility] Notes:               Updates Requested / Reviewed:      Updated Care Coordination Note and External Sources: DIS         Health Maintenance Topics Overdue:      VBHM Score: 3     Colon Cancer Screening  Eye Exam  Mammogram                       Health Maintenance Topic(s) Outreach Outcomes & Actions Taken:    Breast Cancer Screening - Outreach Outcomes & Actions Taken  : External Records Uploaded & Care Team Updated if Applicable and

## 2024-05-19 ENCOUNTER — PATIENT MESSAGE (OUTPATIENT)
Dept: FAMILY MEDICINE | Facility: CLINIC | Age: 54
End: 2024-05-19
Payer: COMMERCIAL

## 2024-05-21 DIAGNOSIS — I10 ESSENTIAL HYPERTENSION: ICD-10-CM

## 2024-05-21 RX ORDER — HYDROCHLOROTHIAZIDE 12.5 MG/1
12.5 TABLET ORAL
Qty: 90 TABLET | Refills: 3 | Status: SHIPPED | OUTPATIENT
Start: 2024-05-21

## 2024-05-21 NOTE — TELEPHONE ENCOUNTER
Provider Staff:  Action required for this patient    Requires labs      Please see care gap opportunities below in Care Due Message.    Thanks!  [facility]     Appointments      Date Provider   Last Visit   2/19/2024 Hector Gallardo MD   Next Visit   8/19/2024 Hector Gallardo MD     Refill Decision Note   Kylee Burgos  is requesting a refill authorization.  Brief Assessment and Rationale for Refill:  Approve     Medication Therapy Plan:         Comments:     Note composed:9:11 AM 05/21/2024

## 2024-05-21 NOTE — TELEPHONE ENCOUNTER
Care Due:                  Date            Visit Type   Department     Provider  --------------------------------------------------------------------------------                                EP -                              [facility]  Last Visit: 02-      CARE ([facility])   MEDICINE       Hector Gallardo                              EP -                              PRIMARY      [facility]  Next Visit: 08-      CARE ([facility])   NORY Gallardo                                                            Last  Test          Frequency    Reason                     Performed    Due Date  --------------------------------------------------------------------------------    HBA1C.......  6 months...  metFORMIN................  02- 08-    [facility] Embedded Care Due Messages. Reference number: [id number].   5/21/2024 3:34:04 AM CDT

## 2024-06-13 ENCOUNTER — PATIENT MESSAGE (OUTPATIENT)
Dept: FAMILY MEDICINE | Facility: CLINIC | Age: 54
End: 2024-06-13
Payer: COMMERCIAL

## 2024-06-17 ENCOUNTER — OFFICE VISIT (OUTPATIENT)
Dept: FAMILY MEDICINE | Facility: CLINIC | Age: 54
End: 2024-06-17
Payer: COMMERCIAL

## 2024-06-17 VITALS
OXYGEN SATURATION: 96 % | BODY MASS INDEX: 22.1 KG/M2 | DIASTOLIC BLOOD PRESSURE: 80 MMHG | WEIGHT: 128.75 LBS | HEART RATE: 91 BPM | SYSTOLIC BLOOD PRESSURE: 116 MMHG

## 2024-06-17 DIAGNOSIS — I10 ESSENTIAL HYPERTENSION: ICD-10-CM

## 2024-06-17 DIAGNOSIS — E11.40 TYPE 2 DIABETES MELLITUS WITH DIABETIC NEUROPATHY, WITHOUT LONG-TERM CURRENT USE OF INSULIN: Primary | ICD-10-CM

## 2024-06-17 DIAGNOSIS — E16.2 HYPOGLYCEMIA: ICD-10-CM

## 2024-06-17 DIAGNOSIS — E78.49 OTHER HYPERLIPIDEMIA: ICD-10-CM

## 2024-06-17 PROCEDURE — 3008F BODY MASS INDEX DOCD: CPT | Mod: CPTII,S$GLB,, | Performed by: FAMILY MEDICINE

## 2024-06-17 PROCEDURE — 3072F LOW RISK FOR RETINOPATHY: CPT | Mod: CPTII,S$GLB,, | Performed by: FAMILY MEDICINE

## 2024-06-17 PROCEDURE — 1159F MED LIST DOCD IN RCRD: CPT | Mod: CPTII,S$GLB,, | Performed by: FAMILY MEDICINE

## 2024-06-17 PROCEDURE — 3066F NEPHROPATHY DOC TX: CPT | Mod: CPTII,S$GLB,, | Performed by: FAMILY MEDICINE

## 2024-06-17 PROCEDURE — 3074F SYST BP LT 130 MM HG: CPT | Mod: CPTII,S$GLB,, | Performed by: FAMILY MEDICINE

## 2024-06-17 PROCEDURE — 4010F ACE/ARB THERAPY RXD/TAKEN: CPT | Mod: CPTII,S$GLB,, | Performed by: FAMILY MEDICINE

## 2024-06-17 PROCEDURE — 3060F POS MICROALBUMINURIA REV: CPT | Mod: CPTII,S$GLB,, | Performed by: FAMILY MEDICINE

## 2024-06-17 PROCEDURE — 99999 PR PBB SHADOW E&M-EST. PATIENT-LVL IV: CPT | Mod: PBBFAC,,, | Performed by: FAMILY MEDICINE

## 2024-06-17 PROCEDURE — 3044F HG A1C LEVEL LT 7.0%: CPT | Mod: CPTII,S$GLB,, | Performed by: FAMILY MEDICINE

## 2024-06-17 PROCEDURE — 99214 OFFICE O/P EST MOD 30 MIN: CPT | Mod: S$GLB,,, | Performed by: FAMILY MEDICINE

## 2024-06-17 PROCEDURE — 3079F DIAST BP 80-89 MM HG: CPT | Mod: CPTII,S$GLB,, | Performed by: FAMILY MEDICINE

## 2024-06-17 RX ORDER — METFORMIN HYDROCHLORIDE 500 MG/1
500 TABLET, EXTENDED RELEASE ORAL 2 TIMES DAILY WITH MEALS
Start: 2024-06-17

## 2024-06-17 NOTE — PROGRESS NOTES
Assessment:       1. Type 2 diabetes mellitus with diabetic neuropathy, without long-term current use of insulin    2. Essential hypertension    3. Other hyperlipidemia    4. Hypoglycemia        Assessment & Plan  Type 2 diabetes mellitus with diabetic neuropathy, without long-term current use of insulin:  Well controlled  -     metFORMIN (GLUCOPHAGE-XR) 500 MG ER 24hr tablet; Take 1 tablet (500 mg total) by mouth 2 (two) times daily with meals.    Essential hypertension: Stable    Other hyperlipidemia: Stable    Hypoglycemia: New problem, next visit workup  The dosage of metformin will be reduced to 500 mg twice daily, to be taken post meals. Continuation of Trulicity is advised.  The patient preferred to wait for the blood work to be done at her next office visit with her primary care physician.  She was advised to drink plenty water, decrease coffee intake, try to avoid caffeine after 12:00 p.m. and increase physical activity.     If the symptoms get any worse, the patient notify us immediately.  The patient was advised to quit smoking, she has in the pre contemplation phase.   Patient agreed with assessment and plan. Patient verbalized understanding.     Subjective:       Patient ID: Kylee Burgos is a 53 y.o. female.    Chief Complaint: Follow-up (Pt reports sugar dropping 3 or 3 times since she last seen PCP, pt reports she feels bad when sugar drops to the 80-90 range. Pt reports sharp pain on right side of head. )    HPI  History of Present Illness  The patient presents for evaluation of multiple medical concerns.    The patient last consulted with Dr. Gallardo in 02/2024, during which she reported episodes of hypoglycemia. She was advised to report any such episodes, given her recent weight loss. Her daily regimen includes 2000 mg of metformin. Since this adjustment, she has experienced 2 to 3 episodes of hypoglycemia, with the most recent episode occurring last Thursday. These episodes include  nausea, headaches, and fatigue, with her blood glucose levels fluctuating between 85 and 95. Her blood glucose levels typically range from 120 to 125. She is also on a weekly dose of Trulicity, which she reports effective. Her next appointment with her primary care physician is scheduled for 08/2024 for diabetic blood work. She denies experiencing numbness or tingling, and her memory remains unaffected.    The patient recently began experiencing headaches, which she attributes to stress. She denies any sinus issues. She maintains adequate hydration and consumes coffee in the morning and occasionally in the evening. She also takes over-the-counter sleep aids.    Supplemental Information   She is a smoker.       Past medical history, past social history was reviewed and discussed with the patient.    Review of Systems   Cardiovascular:  Negative for chest pain and leg swelling.   Gastrointestinal:  Negative for abdominal distention and abdominal pain.   Musculoskeletal:  Negative for arthralgias and back pain.   Neurological:  Positive for headaches.       Objective:      Physical Exam    Physical Exam  Vital Signs  Vitals show a BMI of 22. Blood pressure is 116/80.  The patient is in no acute distress, lungs are clear to auscultation, heart is regular rate and rhythm, extremities has no presence of edema, ears no fluid behind the tympanic membranes.  Pupils are reactive to light and accommodation.  Results  Laboratory Studies  White blood cell count is elevated at 13.5.  Microalbumin is positive but improved from previous. B12 is 308. Vitamin D improved from the previous time.     This note was generated with the assistance of ambient listening technology. Verbal consent was obtained by the patient and accompanying visitor(s) for the recording of patient appointment to facilitate this note. I attest to having reviewed and edited the generated note for accuracy, though some syntax or spelling errors may persist.  Please contact the author of this note for any clarification.

## 2024-07-06 ENCOUNTER — PATIENT MESSAGE (OUTPATIENT)
Dept: FAMILY MEDICINE | Facility: CLINIC | Age: 54
End: 2024-07-06
Payer: COMMERCIAL

## 2024-07-06 DIAGNOSIS — E11.40 TYPE 2 DIABETES MELLITUS WITH DIABETIC NEUROPATHY, WITHOUT LONG-TERM CURRENT USE OF INSULIN: Primary | ICD-10-CM

## 2024-07-22 ENCOUNTER — PATIENT MESSAGE (OUTPATIENT)
Dept: FAMILY MEDICINE | Facility: CLINIC | Age: 54
End: 2024-07-22
Payer: COMMERCIAL

## 2024-07-22 DIAGNOSIS — E78.2 MIXED HYPERLIPIDEMIA: ICD-10-CM

## 2024-07-22 DIAGNOSIS — E11.40 TYPE 2 DIABETES MELLITUS WITH DIABETIC NEUROPATHY, WITHOUT LONG-TERM CURRENT USE OF INSULIN: ICD-10-CM

## 2024-07-22 RX ORDER — PRAVASTATIN SODIUM 20 MG/1
20 TABLET ORAL NIGHTLY
Qty: 90 TABLET | Refills: 2 | Status: SHIPPED | OUTPATIENT
Start: 2024-07-22

## 2024-07-22 NOTE — TELEPHONE ENCOUNTER
No care due was identified.  [facility] Embedded Care Due Messages. Reference number: [id number].   7/22/2024 4:08:05 PM CDT

## 2024-07-22 NOTE — TELEPHONE ENCOUNTER
No care due was identified.  [facility] Embedded Care Due Messages. Reference number: [id number].   7/22/2024 8:23:30 AM CDT

## 2024-07-22 NOTE — TELEPHONE ENCOUNTER
Refill Decision Note   Kylee Burgos  is requesting a refill authorization.  Brief Assessment and Rationale for Refill:  Approve     Medication Therapy Plan:         Comments:     Note composed:2:22 PM 07/22/2024

## 2024-07-23 RX ORDER — METFORMIN HYDROCHLORIDE 500 MG/1
500 TABLET, EXTENDED RELEASE ORAL 2 TIMES DAILY WITH MEALS
Qty: 180 TABLET | Refills: 2
Start: 2024-07-23 | End: 2024-07-23

## 2024-07-23 RX ORDER — METFORMIN HYDROCHLORIDE 500 MG/1
500 TABLET, EXTENDED RELEASE ORAL 2 TIMES DAILY WITH MEALS
Qty: 180 TABLET | Refills: 2 | Status: SHIPPED | OUTPATIENT
Start: 2024-07-23

## 2024-07-23 RX ORDER — METFORMIN HYDROCHLORIDE 500 MG/1
500 TABLET, EXTENDED RELEASE ORAL 2 TIMES DAILY WITH MEALS
Start: 2024-07-23 | End: 2024-07-23

## 2024-08-04 DIAGNOSIS — E11.40 TYPE 2 DIABETES MELLITUS WITH DIABETIC NEUROPATHY, WITHOUT LONG-TERM CURRENT USE OF INSULIN: ICD-10-CM

## 2024-08-05 RX ORDER — METFORMIN HYDROCHLORIDE 500 MG/1
1000 TABLET, EXTENDED RELEASE ORAL 2 TIMES DAILY WITH MEALS
Qty: 360 TABLET | OUTPATIENT
Start: 2024-08-05

## 2024-08-07 ENCOUNTER — PATIENT MESSAGE (OUTPATIENT)
Dept: ADMINISTRATIVE | Facility: HOSPITAL | Age: 54
End: 2024-08-07
Payer: COMMERCIAL

## 2024-08-07 DIAGNOSIS — E11.40 TYPE 2 DIABETES MELLITUS WITH DIABETIC NEUROPATHY, WITHOUT LONG-TERM CURRENT USE OF INSULIN: ICD-10-CM

## 2024-08-07 NOTE — TELEPHONE ENCOUNTER
No care due was identified.  [facility] Embedded Care Due Messages. Reference number: [id number].   8/07/2024 4:01:16 PM CDT

## 2024-08-08 ENCOUNTER — LAB VISIT (OUTPATIENT)
Dept: LAB | Facility: HOSPITAL | Age: 54
End: 2024-08-08
Attending: STUDENT IN AN ORGANIZED HEALTH CARE EDUCATION/TRAINING PROGRAM
Payer: COMMERCIAL

## 2024-08-08 DIAGNOSIS — E11.40 TYPE 2 DIABETES MELLITUS WITH DIABETIC NEUROPATHY, WITHOUT LONG-TERM CURRENT USE OF INSULIN: ICD-10-CM

## 2024-08-08 PROCEDURE — 83036 HEMOGLOBIN GLYCOSYLATED A1C: CPT | Performed by: STUDENT IN AN ORGANIZED HEALTH CARE EDUCATION/TRAINING PROGRAM

## 2024-08-08 PROCEDURE — 36415 COLL VENOUS BLD VENIPUNCTURE: CPT | Mod: PN | Performed by: STUDENT IN AN ORGANIZED HEALTH CARE EDUCATION/TRAINING PROGRAM

## 2024-08-08 PROCEDURE — 80053 COMPREHEN METABOLIC PANEL: CPT | Performed by: STUDENT IN AN ORGANIZED HEALTH CARE EDUCATION/TRAINING PROGRAM

## 2024-08-09 LAB
ALBUMIN SERPL BCP-MCNC: 3.6 G/DL (ref 3.5–5.2)
ALP SERPL-CCNC: 69 U/L (ref 55–135)
ALT SERPL W/O P-5'-P-CCNC: 24 U/L (ref 10–44)
ANION GAP SERPL CALC-SCNC: 10 MMOL/L (ref 8–16)
AST SERPL-CCNC: 17 U/L (ref 10–40)
BILIRUB SERPL-MCNC: 0.2 MG/DL (ref 0.1–1)
BUN SERPL-MCNC: 21 MG/DL (ref 6–20)
CALCIUM SERPL-MCNC: 9.3 MG/DL (ref 8.7–10.5)
CHLORIDE SERPL-SCNC: 109 MMOL/L (ref 95–110)
CO2 SERPL-SCNC: 22 MMOL/L (ref 23–29)
CREAT SERPL-MCNC: 0.8 MG/DL (ref 0.5–1.4)
EST. GFR  (NO RACE VARIABLE): >60 ML/MIN/1.73 M^2
ESTIMATED AVG GLUCOSE: 117 MG/DL (ref 68–131)
GLUCOSE SERPL-MCNC: 92 MG/DL (ref 70–110)
HBA1C MFR BLD: 5.7 % (ref 4–5.6)
POTASSIUM SERPL-SCNC: 4.1 MMOL/L (ref 3.5–5.1)
PROT SERPL-MCNC: 6.9 G/DL (ref 6–8.4)
SODIUM SERPL-SCNC: 141 MMOL/L (ref 136–145)

## 2024-08-12 RX ORDER — METFORMIN HYDROCHLORIDE 500 MG/1
500 TABLET, EXTENDED RELEASE ORAL 2 TIMES DAILY WITH MEALS
Qty: 180 TABLET | Refills: 2 | Status: SHIPPED | OUTPATIENT
Start: 2024-08-12

## 2024-08-19 ENCOUNTER — OFFICE VISIT (OUTPATIENT)
Dept: FAMILY MEDICINE | Facility: CLINIC | Age: 54
End: 2024-08-19
Payer: COMMERCIAL

## 2024-08-19 ENCOUNTER — PATIENT MESSAGE (OUTPATIENT)
Dept: FAMILY MEDICINE | Facility: CLINIC | Age: 54
End: 2024-08-19

## 2024-08-19 VITALS
BODY MASS INDEX: 22.81 KG/M2 | WEIGHT: 133.63 LBS | HEART RATE: 95 BPM | SYSTOLIC BLOOD PRESSURE: 130 MMHG | HEIGHT: 64 IN | DIASTOLIC BLOOD PRESSURE: 78 MMHG | OXYGEN SATURATION: 98 %

## 2024-08-19 DIAGNOSIS — F17.210 CIGARETTE NICOTINE DEPENDENCE WITHOUT COMPLICATION: ICD-10-CM

## 2024-08-19 DIAGNOSIS — M34.9 SCLERODERMA: ICD-10-CM

## 2024-08-19 DIAGNOSIS — M79.7 FIBROMYALGIA: Primary | ICD-10-CM

## 2024-08-19 DIAGNOSIS — E55.9 VITAMIN D DEFICIENCY DISEASE: ICD-10-CM

## 2024-08-19 DIAGNOSIS — E78.2 MIXED HYPERLIPIDEMIA: ICD-10-CM

## 2024-08-19 DIAGNOSIS — E11.40 TYPE 2 DIABETES MELLITUS WITH DIABETIC NEUROPATHY, WITHOUT LONG-TERM CURRENT USE OF INSULIN: ICD-10-CM

## 2024-08-19 DIAGNOSIS — I10 ESSENTIAL HYPERTENSION: ICD-10-CM

## 2024-08-19 PROCEDURE — 3072F LOW RISK FOR RETINOPATHY: CPT | Mod: CPTII,S$GLB,, | Performed by: STUDENT IN AN ORGANIZED HEALTH CARE EDUCATION/TRAINING PROGRAM

## 2024-08-19 PROCEDURE — 3008F BODY MASS INDEX DOCD: CPT | Mod: CPTII,S$GLB,, | Performed by: STUDENT IN AN ORGANIZED HEALTH CARE EDUCATION/TRAINING PROGRAM

## 2024-08-19 PROCEDURE — 99214 OFFICE O/P EST MOD 30 MIN: CPT | Mod: S$GLB,,, | Performed by: STUDENT IN AN ORGANIZED HEALTH CARE EDUCATION/TRAINING PROGRAM

## 2024-08-19 PROCEDURE — 3066F NEPHROPATHY DOC TX: CPT | Mod: CPTII,S$GLB,, | Performed by: STUDENT IN AN ORGANIZED HEALTH CARE EDUCATION/TRAINING PROGRAM

## 2024-08-19 PROCEDURE — 4010F ACE/ARB THERAPY RXD/TAKEN: CPT | Mod: CPTII,S$GLB,, | Performed by: STUDENT IN AN ORGANIZED HEALTH CARE EDUCATION/TRAINING PROGRAM

## 2024-08-19 PROCEDURE — 3075F SYST BP GE 130 - 139MM HG: CPT | Mod: CPTII,S$GLB,, | Performed by: STUDENT IN AN ORGANIZED HEALTH CARE EDUCATION/TRAINING PROGRAM

## 2024-08-19 PROCEDURE — 1159F MED LIST DOCD IN RCRD: CPT | Mod: CPTII,S$GLB,, | Performed by: STUDENT IN AN ORGANIZED HEALTH CARE EDUCATION/TRAINING PROGRAM

## 2024-08-19 PROCEDURE — 3044F HG A1C LEVEL LT 7.0%: CPT | Mod: CPTII,S$GLB,, | Performed by: STUDENT IN AN ORGANIZED HEALTH CARE EDUCATION/TRAINING PROGRAM

## 2024-08-19 PROCEDURE — 3078F DIAST BP <80 MM HG: CPT | Mod: CPTII,S$GLB,, | Performed by: STUDENT IN AN ORGANIZED HEALTH CARE EDUCATION/TRAINING PROGRAM

## 2024-08-19 PROCEDURE — 99999 PR PBB SHADOW E&M-EST. PATIENT-LVL IV: CPT | Mod: PBBFAC,,, | Performed by: STUDENT IN AN ORGANIZED HEALTH CARE EDUCATION/TRAINING PROGRAM

## 2024-08-19 PROCEDURE — 3060F POS MICROALBUMINURIA REV: CPT | Mod: CPTII,S$GLB,, | Performed by: STUDENT IN AN ORGANIZED HEALTH CARE EDUCATION/TRAINING PROGRAM

## 2024-08-19 RX ORDER — DULAGLUTIDE 0.75 MG/.5ML
0.75 INJECTION, SOLUTION SUBCUTANEOUS
Qty: 12 PEN | Refills: 3 | Status: SHIPPED | OUTPATIENT
Start: 2024-08-19

## 2024-08-19 RX ORDER — MELOXICAM 15 MG/1
15 TABLET ORAL DAILY
Qty: 30 TABLET | Refills: 11 | Status: SHIPPED | OUTPATIENT
Start: 2024-08-19

## 2024-08-19 RX ORDER — PRAVASTATIN SODIUM 20 MG/1
20 TABLET ORAL NIGHTLY
Qty: 90 TABLET | Refills: 3 | Status: SHIPPED | OUTPATIENT
Start: 2024-08-19

## 2024-08-19 RX ORDER — HYDROCHLOROTHIAZIDE 12.5 MG/1
12.5 TABLET ORAL DAILY
Qty: 90 TABLET | Refills: 3 | Status: SHIPPED | OUTPATIENT
Start: 2024-08-19

## 2024-08-19 RX ORDER — METHOCARBAMOL 500 MG/1
500 TABLET, FILM COATED ORAL NIGHTLY
Qty: 30 TABLET | Refills: 11 | Status: SHIPPED | OUTPATIENT
Start: 2024-08-19

## 2024-08-19 RX ORDER — LISINOPRIL 40 MG/1
40 TABLET ORAL DAILY
Qty: 90 TABLET | Refills: 3 | Status: SHIPPED | OUTPATIENT
Start: 2024-08-19

## 2024-08-19 RX ORDER — ERGOCALCIFEROL 1.25 MG/1
50000 CAPSULE ORAL
Qty: 12 CAPSULE | Refills: 3 | Status: SHIPPED | OUTPATIENT
Start: 2024-08-19

## 2024-08-19 NOTE — ASSESSMENT & PLAN NOTE
Patient has been having issues lately with pains as mentioned in review of systems. She did wean off duloxetine recently but hasn't noticed any change in the pain with this. Advil and Motrin do not seem to help anymore. She asked for referral to rheumatology. In the mean time, we will try the listed medications.

## 2024-08-19 NOTE — PROGRESS NOTES
"Name: Kylee Burgos  MRN: [id number]  : 1970    Subjective   HPI  Patient presents for regular follow up.     Review of Systems   Musculoskeletal:  Positive for myalgias (Patient describes pains in right arm, right wrist, right calf especially at night that she believes is related to her scleroderma/Raynauds).        Patient Active Problem List   Diagnosis    Multiple joint pain    Hand numbness    Vitamin D deficiency disease    Fibromyalgia    Anxiety    Essential hypertension    Type 2 diabetes mellitus with diabetic neuropathy, without long-term current use of insulin    Mixed hyperlipidemia    Antalgic gait    Decreased range of motion of right ankle    Weakness of right lower extremity    Scleroderma    Cigarette nicotine dependence without complication       Current Outpatient Medications on File Prior to Visit   Medication Sig Dispense Refill    BD INSULIN PEN NEEDLE UF MINI 31 gauge x 3/16" Ndle       blood sugar diagnostic Strp To check BG 1 time daily, to use with insurance preferred meter 100 each 11    blood-glucose meter kit To check BG 1 time daily, to use with insurance preferred meter 1 each 0    lancets 33 gauge Misc 1 lancet by Misc.(Non-Drug; Combo Route) route 4 (four) times daily.      lancets Misc To check BG 1 time daily, to use with insurance preferred meter 100 each 11    metFORMIN (GLUCOPHAGE-XR) 500 MG ER 24hr tablet Take 1 tablet (500 mg total) by mouth 2 (two) times daily with meals. 180 tablet 2    ondansetron (ZOFRAN-ODT) 8 MG TbDL Take 1 tablet (8 mg total) by mouth every 8 (eight) hours as needed (for nausea). 20 tablet 1    ONETOUCH VERIO TEST STRIPS Strp USE TO TEST ONCE DAILY 100 strip 2    [DISCONTINUED] dulaglutide (TRULICITY) 0.75 mg/0.5 mL pen injector INJECT 0.75MG INTO THE SKIN EVERY 7 DAYS 4 pen 6    [DISCONTINUED] ergocalciferol (ERGOCALCIFEROL) 50,000 unit Cap TAKE 1 CAPSULE BY MOUTH EVERY 7 DAYS 12 capsule 1    [DISCONTINUED] hydroCHLOROthiazide (HYDRODIURIL) " 12.5 MG Tab TAKE 1 TABLET(12.5 MG) BY MOUTH EVERY DAY 90 tablet 3    [DISCONTINUED] lisinopriL (PRINIVIL,ZESTRIL) 40 MG tablet TAKE 1 TABLET(40 MG) BY MOUTH EVERY DAY 90 tablet 3    [DISCONTINUED] pravastatin (PRAVACHOL) 20 MG tablet TAKE 1 TABLET(20 MG) BY MOUTH EVERY EVENING 90 tablet 2    ibuprofen (ADVIL,MOTRIN) 200 MG tablet Take 200 mg by mouth every 6 (six) hours as needed for Pain. (Patient not taking: Reported on 2024)       No current facility-administered medications on file prior to visit.       Past Medical History:   Diagnosis Date    Scleroderma        Past Surgical History:   Procedure Laterality Date     SECTION      HYSTERECTOMY          Family History   Problem Relation Name Age of Onset    Heart disease Mother      Hypertension Mother      Lupus Paternal Aunt      Glaucoma Neg Hx      Macular degeneration Neg Hx         Social History     Socioeconomic History    Marital status:    Tobacco Use    Smoking status: Every Day     Current packs/day: 0.50     Average packs/day: 0.5 packs/day for 37.6 years (18.8 ttl pk-yrs)     Types: Cigarettes     Start date:     Smokeless tobacco: Never   Substance and Sexual Activity    Alcohol use: Yes     Comment: socially    Drug use: No    Sexual activity: Yes     Partners: Male     Social Determinants of Health     Financial Resource Strain: Low Risk  (2023)    Overall Financial Resource Strain (CARDIA)     Difficulty of Paying Living Expenses: Not very hard   Food Insecurity: No Food Insecurity (2023)    Hunger Vital Sign     Worried About Running Out of Food in the Last Year: Never true     Ran Out of Food in the Last Year: Never true   Transportation Needs: No Transportation Needs (2023)    PRAPARE - Transportation     Lack of Transportation (Medical): No     Lack of Transportation (Non-Medical): No   Physical Activity: Unknown (2023)    Exercise Vital Sign     Days of Exercise per Week: Patient declined    Stress: Stress Concern Present (7/11/2023)    [employer] - Occupational Stress Questionnaire     Feeling of Stress : To some extent   Housing Stability: High Risk (7/21/2022)    Housing Stability Vital Sign     Unable to Pay for Housing in the Last Year: No     Unstable Housing in the Last Year: Yes       Review of patient's allergies indicates:  No Known Allergies     Health Maintenance Due   Topic Date Due    TETANUS VACCINE  Never done    Colorectal Cancer Screening  Never done    Shingles Vaccine (1 of 2) Never done    COVID-19 Vaccine (3 - 2023-24 season) 09/01/2023    Eye Exam  01/18/2024       Objective   Vitals:    08/19/24 1113   BP: 130/78   Pulse: 95        Physical Exam  Constitutional:       General: She is not in acute distress.     Appearance: Normal appearance. She is well-developed.   HENT:      Head: Normocephalic and atraumatic.      Right Ear: External ear normal.      Left Ear: External ear normal.   Eyes:      Conjunctiva/sclera: Conjunctivae normal.   Pulmonary:      Effort: Pulmonary effort is normal. No respiratory distress.   Abdominal:      General: Abdomen is flat. There is no distension.   Musculoskeletal:         General: No swelling or deformity.      Right upper arm: No tenderness.      Right lower leg: No tenderness. No edema.      Left lower leg: No edema.   Skin:     General: Skin is warm and dry.      Coloration: Skin is not jaundiced.   Neurological:      Mental Status: She is alert and oriented to person, place, and time. Mental status is at baseline.   Psychiatric:         Attention and Perception: Attention and perception normal.         Mood and Affect: Mood normal.         Speech: Speech normal.         Behavior: Behavior normal. Behavior is cooperative.         Thought Content: Thought content normal.         Cognition and Memory: Cognition normal.         Judgment: Judgment normal.          Assessment & Plan   1. Fibromyalgia  -      "Ambulatory referral/consult to Rheumatology; Future; Expected date: 08/26/2024  -     methocarbamoL (ROBAXIN) 500 MG Tab; Take 1 tablet (500 mg total) by mouth every evening.  Dispense: 30 tablet; Refill: 11  -     meloxicam (MOBIC) 15 MG tablet; Take 1 tablet (15 mg total) by mouth once daily.  Dispense: 30 tablet; Refill: 11    2. Scleroderma  Assessment & Plan:  Patient has been having issues lately with pains as mentioned in review of systems. She did wean off duloxetine recently but hasn't noticed any change in the pain with this. Advil and Motrin do not seem to help anymore. She asked for referral to rheumatology. In the mean time, we will try the listed medications.     Orders:  -     Ambulatory referral/consult to Rheumatology; Future; Expected date: 08/26/2024    3. Type 2 diabetes mellitus with diabetic neuropathy, without long-term current use of insulin  Assessment & Plan:  Patient continues to feel these spontaneous "off" feelings several times per week. She will check her sugars and they will be in the 80s-90s range. I do not suspect hypoglycemia. These are still happening despite a reduction in her metformin. Her A1C is 5.7%, so she can stop metformin for one to two weeks to see if this resolves. However, I have asked her to keep a symptom diary to see if there are any other, more likely triggers. Continue dulaglutide.    If the symptoms improve with cessation of metformin, I'll check another A1C in 3 months.    Orders:  -     dulaglutide (TRULICITY) 0.75 mg/0.5 mL pen injector; Inject 0.75 mg into the skin every 7 days.  Dispense: 12 Pen; Refill: 3    4. Vitamin D deficiency disease  -     ergocalciferol (ERGOCALCIFEROL) 50,000 unit Cap; Take 1 capsule (50,000 Units total) by mouth every 7 days.  Dispense: 12 capsule; Refill: 3    5. Essential hypertension  -     hydroCHLOROthiazide (HYDRODIURIL) 12.5 MG Tab; Take 1 tablet (12.5 mg total) by mouth once daily.  Dispense: 90 tablet; Refill: 3  -     " lisinopriL (PRINIVIL,ZESTRIL) 40 MG tablet; Take 1 tablet (40 mg total) by mouth once daily.  Dispense: 90 tablet; Refill: 3    6. Mixed hyperlipidemia  -     pravastatin (PRAVACHOL) 20 MG tablet; Take 1 tablet (20 mg total) by mouth every evening.  Dispense: 90 tablet; Refill: 3    7. Cigarette nicotine dependence without complication  Assessment & Plan:  Not interested in quitting at this time.           Follow up in 6 months.     Hector Gallardo MD  08/19/2024

## 2024-08-19 NOTE — ASSESSMENT & PLAN NOTE
"Patient continues to feel these spontaneous "off" feelings several times per week. She will check her sugars and they will be in the 80s-90s range. I do not suspect hypoglycemia. These are still happening despite a reduction in her metformin. Her A1C is 5.7%, so she can stop metformin for one to two weeks to see if this resolves. However, I have asked her to keep a symptom diary to see if there are any other, more likely triggers. Continue dulaglutide.    If the symptoms improve with cessation of metformin, I'll check another A1C in 3 months.  "

## 2024-08-20 ENCOUNTER — TELEPHONE (OUTPATIENT)
Dept: RHEUMATOLOGY | Facility: CLINIC | Age: 54
End: 2024-08-20
Payer: COMMERCIAL

## 2024-08-20 NOTE — TELEPHONE ENCOUNTER
----- Message from Kyung Landrum sent at 8/20/2024  9:32 AM CDT -----  Contact: pt  Type:  Sooner Apoointment Request    Caller is requesting a sooner appointment.  Caller declined first available appointment listed below.  Caller will not accept being placed on the waitlist and is requesting a message be sent to doctor.    Name of Caller: pt    When is the first available appointment? Department book    Symptoms: referral for   M79.7 (ICD-10-CM) - Fibromyalgia  M34.9 (ICD-10-CM) - Scleroderma      Would the patient rather a call back or a response via [facility]?  Alden back    Best Call Back Number: [phone]      Additional Information: please call to advise    Thanks

## 2024-08-23 ENCOUNTER — PATIENT MESSAGE (OUTPATIENT)
Dept: FAMILY MEDICINE | Facility: CLINIC | Age: 54
End: 2024-08-23
Payer: COMMERCIAL

## 2024-08-23 ENCOUNTER — TELEPHONE (OUTPATIENT)
Dept: FAMILY MEDICINE | Facility: CLINIC | Age: 54
End: 2024-08-23

## 2024-08-23 NOTE — TELEPHONE ENCOUNTER
----- Message from Parker Nicholas sent at 8/23/2024  2:51 PM CDT -----  Type: Needs Medical Advice  Who Called:  [facility]- Dr nohemi MARTINEZ   Pharmacy name and phone #:    Best Call Back Number: [phone]  Additional Information: is calling the office for to reveive a face sheet for pt bloodwork

## 2024-08-23 NOTE — TELEPHONE ENCOUNTER
Faxed 08/19/2024 office notes and   labs from 11/16/2023 - 08/08/2024    Sent to Dr. Borja  [phone]

## 2024-08-27 ENCOUNTER — PATIENT MESSAGE (OUTPATIENT)
Dept: FAMILY MEDICINE | Facility: CLINIC | Age: 54
End: 2024-08-27
Payer: COMMERCIAL

## 2024-09-03 ENCOUNTER — PATIENT MESSAGE (OUTPATIENT)
Dept: FAMILY MEDICINE | Facility: CLINIC | Age: 54
End: 2024-09-03
Payer: COMMERCIAL

## 2024-09-21 NOTE — PATIENT INSTRUCTIONS
"· Follow up with your primary care if symptoms do not improve, or you may return here at any time.  · If you were referred to a specialist, please follow up with that specialty.  · If you were prescribed antibiotics, please take them to completion.  · If you were prescribed a narcotic or any medication with sedative effects, do not drive or operate heavy equipment or machinery while taking these medications.  · You must understand that you have received treatment at an Urgent Care facility only, and that you may be released before all of your medical problems are known or treated. Urgent Care facilities are not equipped to handle life threatening emergencies. It is recommended that you seek care at an Emergency Department for further evaluation of worsening or concerning symptoms, or possibly life threatening conditions as discussed.                                        If you  smoke, please stop smoking      You have tested POSITIVE for COVID-19 today    Quarantine recommendations based on CDC guidelines:      No symptoms present: You must quarantine for 5 days starting on the day of the positive test.    Symptoms present: You must quarantine for 5 days starting on the day of symptom onset.      AFTER 5 days, if your symptoms have improved and you are fever free, you can return to the community on day 6.   The CDC recommends strict mask use for 5 days following quarantine end.      You cannot test negative to "test out" of quarantine. Per CDC recommendations we do not retest within 90 days of a positive test.      Please refer to CDC website for additional information        Patient Education       COVID-19 Discharge Instructions   About this topic   Coronavirus disease 2019 is also known as COVID-19. It is a viral illness that infects the lungs. It is caused by a virus called SARS-associated coronavirus (SARS-CoV-2).  The signs of COVID-19 most often start a few days after you have been infected. In some people, " it takes longer to show signs. Others never show signs of the infection. You may have a cough, fever, shaking chills and it may be hard to breathe. You may be very tired, have muscle aches, a headache or sore throat. Some people have an upset stomach or loose stools. Others lose their sense of smell or taste. You may not have these signs all the time and they may come and go while you are sick.  The virus spreads easily through droplets when you talk, sneeze, or cough. You can pass the virus to others when you are talking close together, singing, hugging, sharing food, or shaking hands. Doctors believe the germs also survive on surfaces like tables, door handles, and telephones. However, this is not a common way that COVID-19 spreads. Doctors believe you can also spread the infection even if you dont have any symptoms, but they do not know how that happens. This is why getting vaccinated is one of the best ways to keep you healthy and slow the spread of the virus.  Some people have a mild case of COVID-19 and are able to stay at home and away from others until they feel better. Others may need to be in the hospital if they are very sick. Some people with COVID-19 can have some symptoms for weeks or months. People with COVID-19 must isolate themselves. You can start to be around others when your doctor says it is safe to do so.       What care is needed at home?   · Ask your doctor what you need to do when you go home. Make sure you ask questions if you do not understand what the doctor says.  · Drink lots of water, juice, or broth to replace fluids lost from a fever.  · You may use cool mist humidifiers to help ease congestion and coughing.  · Use 2 to 3 pillows to prop yourself up when you lie down to make it easier to breathe and sleep.  · Do not smoke and do not drink beer, wine, and mixed drinks (alcohol).  · To lower the chance of passing the infection to others, get a COVID-19 vaccine after your infection has  resolved.  · If you have not been fully vaccinated:  ? Wear a mask over your mouth and nose if you are around others who are not sick. Cloth masks work best if they have more than one layer of fabric.  ? Wash your hands often.  ? Stay home in a separate room, if possible, away from others. Only go out to get medical care.  ? Use a separate bathroom if possible.  ? Do not make food for others.  What follow-up care is needed?   · Your doctor may ask you to make visits to the office to check on your progress. Be sure to keep these visits. Make sure you wear a mask at these visits.  · If you can, tell the staff you have COVID-19 ahead of time so they can take extra care to stop the disease from spreading.  · It may take a few weeks before your health returns to normal.  What drugs may be needed?   The doctor may order drugs to:  · Help with breathing  · Help with fever  · Help with swelling in your airways and lungs  · Control coughing  · Ease a sore throat  · Help a runny or stuffy nose  Will physical activity be limited?   You may have to limit your physical activity. Talk to your doctor about the right amount of activity for you. If you have been very sick with COVID-19, it can take some time to get your strength back.  Will there be any other care needed?   Doctors do not know how long you can pass the virus on to others after you are sick. This is why it is important to stay in a separate room, if possible, when you are sick. For now, doctors are giving general guidelines for you to follow after you have been sick. Before you go around other people, you should:  · Be fever free for 24 hours without taking any drugs to lower the fever  · Have no symptoms of cough or shortness of breath  · Wait at least 10 days after first having symptoms or your first positive test, and you need to be symptom free as above. Some experts suggest waiting 20 days if you have had a more severe infection.  Talk with your doctor about  getting a COVID-19 vaccine.  What problems could happen?   · Fluid loss. This is dehydration.  · Short-term or long-term lung damage  · Heart problems  · Death  When do I need to call the doctor?   · You are having so much trouble breathing that you can only say one or two words at a time.  · You need to sit upright at all times to be able to breathe and/or cannot lie down.  · You are very confused or cannot stay awake.  · Your lips or skin start to turn blue or grey.  · You think you might be having a medical emergency. Some examples of medical emergencies are:  ? Severe chest pain.  ? Not able to speak or move normally.  · You have trouble breathing when talking or sitting still.  · You have new shortness of breath.  · You become weak or dizzy.  · You have very dark urine or do not pass urine for more than 8 hours.  · You have new or worsening COVID-19 symptoms like:  ? Fever  ? Cough  ? Feeling very tired  ? Shaking chills  ? Headache  ? Trouble swallowing  ? Throwing up  ? Loose stools  ? Reddish purple spots on your fingers or toes  Teach Back: Helping You Understand   The Teach Back Method helps you understand the information we are giving you. After you talk with the staff, tell them in your own words what you learned. This helps to make sure the staff has described each thing clearly. It also helps to explain things that may have been confusing. Before going home, make sure you can do these:  · I can tell you about my condition.  · I can tell you what may help ease my breathing.  · I can tell you what I can do to help avoid passing the infection to others.  · I can tell you what I will do if I have trouble breathing; feel sleepy or confused; or my fingertips, fingernails, skin, or lips are blue.  Where can I learn more?   Centers for Disease Control and Prevention  https://www.cdc.gov/coronavirus/2019-ncov/about/index.html   Centers for Disease Control and  Prevention  https://www.cdc.gov/coronavirus/2019-ncov/hcp/disposition-in-home-patients.html   World Health Organization  https://www.who.int/news-room/q-a-detail/z-x-oekgzricznaac   Last Reviewed Date   2021-10-05  Consumer Information Use and Disclaimer   This information is not specific medical advice and does not replace information you receive from your health care provider. This is only a brief summary of general information. It does NOT include all information about conditions, illnesses, injuries, tests, procedures, treatments, therapies, discharge instructions or life-style choices that may apply to you. You must talk with your health care provider for complete information about your health and treatment options. This information should not be used to decide whether or not to accept your health care providers advice, instructions or recommendations. Only your health care provider has the knowledge and training to provide advice that is right for you.  Copyright   Copyright © 2021 [employer] and its affiliates and/or licensors. All rights reserved.     None

## 2024-09-26 ENCOUNTER — PATIENT MESSAGE (OUTPATIENT)
Dept: ENDOCRINOLOGY | Facility: CLINIC | Age: 54
End: 2024-09-26
Payer: COMMERCIAL

## 2024-10-10 ENCOUNTER — OFFICE VISIT (OUTPATIENT)
Dept: ENDOCRINOLOGY | Facility: CLINIC | Age: 54
End: 2024-10-10
Payer: COMMERCIAL

## 2024-10-10 VITALS
DIASTOLIC BLOOD PRESSURE: 100 MMHG | HEART RATE: 96 BPM | BODY MASS INDEX: 23.29 KG/M2 | WEIGHT: 136.44 LBS | OXYGEN SATURATION: 99 % | TEMPERATURE: 98 F | HEIGHT: 64 IN | SYSTOLIC BLOOD PRESSURE: 140 MMHG

## 2024-10-10 DIAGNOSIS — E20.9 HYPOPARATHYROIDISM, UNSPECIFIED HYPOPARATHYROIDISM TYPE: Primary | ICD-10-CM

## 2024-10-10 DIAGNOSIS — E55.9 VITAMIN D DEFICIENCY DISEASE: ICD-10-CM

## 2024-10-10 DIAGNOSIS — E11.40 TYPE 2 DIABETES MELLITUS WITH DIABETIC NEUROPATHY, WITHOUT LONG-TERM CURRENT USE OF INSULIN: ICD-10-CM

## 2024-10-10 PROCEDURE — 3044F HG A1C LEVEL LT 7.0%: CPT | Mod: CPTII,S$GLB,, | Performed by: PHYSICIAN ASSISTANT

## 2024-10-10 PROCEDURE — 1160F RVW MEDS BY RX/DR IN RCRD: CPT | Mod: CPTII,S$GLB,, | Performed by: PHYSICIAN ASSISTANT

## 2024-10-10 PROCEDURE — 3080F DIAST BP >= 90 MM HG: CPT | Mod: CPTII,S$GLB,, | Performed by: PHYSICIAN ASSISTANT

## 2024-10-10 PROCEDURE — 99999 PR PBB SHADOW E&M-EST. PATIENT-LVL IV: CPT | Mod: PBBFAC,,, | Performed by: PHYSICIAN ASSISTANT

## 2024-10-10 PROCEDURE — 1159F MED LIST DOCD IN RCRD: CPT | Mod: CPTII,S$GLB,, | Performed by: PHYSICIAN ASSISTANT

## 2024-10-10 PROCEDURE — 3060F POS MICROALBUMINURIA REV: CPT | Mod: CPTII,S$GLB,, | Performed by: PHYSICIAN ASSISTANT

## 2024-10-10 PROCEDURE — 3077F SYST BP >= 140 MM HG: CPT | Mod: CPTII,S$GLB,, | Performed by: PHYSICIAN ASSISTANT

## 2024-10-10 PROCEDURE — 99213 OFFICE O/P EST LOW 20 MIN: CPT | Mod: S$GLB,,, | Performed by: PHYSICIAN ASSISTANT

## 2024-10-10 PROCEDURE — 4010F ACE/ARB THERAPY RXD/TAKEN: CPT | Mod: CPTII,S$GLB,, | Performed by: PHYSICIAN ASSISTANT

## 2024-10-10 PROCEDURE — 3066F NEPHROPATHY DOC TX: CPT | Mod: CPTII,S$GLB,, | Performed by: PHYSICIAN ASSISTANT

## 2024-10-10 PROCEDURE — 3008F BODY MASS INDEX DOCD: CPT | Mod: CPTII,S$GLB,, | Performed by: PHYSICIAN ASSISTANT

## 2024-10-10 PROCEDURE — 3072F LOW RISK FOR RETINOPATHY: CPT | Mod: CPTII,S$GLB,, | Performed by: PHYSICIAN ASSISTANT

## 2024-10-10 NOTE — PROGRESS NOTES
"CC: Hypoparathyroidism    HPI: Kylee Burgos is a 54 y.o. female here for hypoparathyroidism along with pending conditions listed in the Visit Diagnosis. Diagnosed in 9/24 by rheumatology. No fhx of parathyroid, thyroid, brain tumors or pancreatic tumors. Dx with scleroderma at 4 yo. Recent rheumatologist stated she did not have scleroderma. + muscle cramps and pain in the right arm, chronic right sided weakness (since childhood). No perioral numbness. Fractured her ankle twice stepping off of a curb. Recent fx was in 7/23. Not taking ca.  No recent falls. Last seen by CY Schneider in 4/23 for T2DM. A1c is 5.7%. Was having hypoglycemia. Metformin was decreased. No hypoglycemia recently.    PMHx, PSHx: reviewed in epic. S/p JANE from endometriosis at 35.   Social Hx: no ETOH use. Smokes 1/2 ppd.     Wt Readings from Last 10 Encounters:   10/10/24 61.9 kg (136 lb 7.4 oz)   08/19/24 60.6 kg (133 lb 9.6 oz)   06/17/24 58.4 kg (128 lb 12 oz)   02/19/24 58.3 kg (128 lb 8.5 oz)   11/16/23 59.5 kg (131 lb 2.8 oz)   09/08/23 61.6 kg (135 lb 12.9 oz)   08/29/23 62.1 kg (137 lb)   08/17/23 62.2 kg (137 lb 2 oz)   07/10/23 62.5 kg (137 lb 12.6 oz)   05/17/23 62.9 kg (138 lb 10.7 oz)      ROS:   Constitutional: + wt gain 8 lbs  Eyes: No recent visual changes  Cardiovascular: Denies current anginal symptoms  Respiratory: Denies current respiratory difficulty  Gastrointestinal: Denies recent bowel disturbances  GenitoUrinary - No dysuria  Skin: No new skin rash  Neurologic: No focal neurologic complaints  Musculoskeletal: no joint pain  Endocrine: no polyphagia, polydipsia or polyuria  Remainder ROS negative     BP (!) 140/100   Pulse 96   Temp 97.7 °F (36.5 °C) (Oral)   Ht 5' 4" (1.626 m)   Wt 61.9 kg (136 lb 7.4 oz)   SpO2 99%   BMI 23.42 kg/m²      Personally reviewed labs below:  [facility] 9/12/24 Dr. Borja  PTH 14  Cmp wnl  Ca 9.5  Vd 39.6    Lab Results   Component Value Date    TSH 1.410 07/07/2023        Chemistry "        Component Value Date/Time     08/08/2024 0841    K 4.1 08/08/2024 0841     08/08/2024 0841    CO2 22 (L) 08/08/2024 0841    BUN 21 (H) 08/08/2024 0841    CREATININE 0.8 08/08/2024 0841    GLU 92 08/08/2024 0841        Component Value Date/Time    CALCIUM 9.3 08/08/2024 0841    ALKPHOS 69 08/08/2024 0841    AST 17 08/08/2024 0841    ALT 24 08/08/2024 0841    BILITOT 0.2 08/08/2024 0841    ESTGFRAFRICA >60.0 03/15/2022 0748    EGFRNONAA >60.0 03/15/2022 0748           Lab Results   Component Value Date    HGBA1C 5.7 (H) 08/08/2024    HGBA1C 5.8 (H) 02/15/2024    HGBA1C 5.9 (H) 07/07/2023        PE:  GENERAL: Well developed, well nourished  NECK: Supple neck, normal thyroid. No bruit  LYMPHATIC: No cervical or supraclavicular lymphadenopathy  CARDIOVASCULAR: Normal heart sounds, no pedal edema  RESPIRATORY: Normal effort, clear to auscultation  MUSC: + rt hand/foot smaller than left side, rt middle finger smaller compared to other fingers  NEURO: steady gait, CN ll-Xll grossly intact  PSYCH: normal mood and affect    Assessment/Plan:   1. Hypoparathyroidism, unspecified hypoparathyroidism type        2. Type 2 diabetes mellitus with diabetic neuropathy, without long-term current use of insulin        3. Vitamin D deficiency disease           Hypoparathyroidism  Ca is wnl  Declines repeating labs.    T2DM  Stable  Continue metformin and Trulicity    Hypovitaminosis d  Stable  Continue ergo    F/u prn

## 2024-10-21 ENCOUNTER — PATIENT MESSAGE (OUTPATIENT)
Dept: FAMILY MEDICINE | Facility: CLINIC | Age: 54
End: 2024-10-21
Payer: COMMERCIAL

## 2024-10-23 ENCOUNTER — PATIENT MESSAGE (OUTPATIENT)
Dept: FAMILY MEDICINE | Facility: CLINIC | Age: 54
End: 2024-10-23
Payer: COMMERCIAL

## 2024-12-30 ENCOUNTER — PATIENT MESSAGE (OUTPATIENT)
Dept: FAMILY MEDICINE | Facility: CLINIC | Age: 54
End: 2024-12-30
Payer: COMMERCIAL

## 2024-12-30 DIAGNOSIS — E11.40 TYPE 2 DIABETES MELLITUS WITH DIABETIC NEUROPATHY, WITHOUT LONG-TERM CURRENT USE OF INSULIN: Primary | ICD-10-CM

## 2024-12-30 DIAGNOSIS — Z00.00 PREVENTATIVE HEALTH CARE: ICD-10-CM

## 2025-02-06 ENCOUNTER — LAB VISIT (OUTPATIENT)
Dept: LAB | Facility: HOSPITAL | Age: 55
End: 2025-02-06
Attending: STUDENT IN AN ORGANIZED HEALTH CARE EDUCATION/TRAINING PROGRAM
Payer: COMMERCIAL

## 2025-02-06 DIAGNOSIS — Z00.00 PREVENTATIVE HEALTH CARE: ICD-10-CM

## 2025-02-06 DIAGNOSIS — E11.40 TYPE 2 DIABETES MELLITUS WITH DIABETIC NEUROPATHY, WITHOUT LONG-TERM CURRENT USE OF INSULIN: ICD-10-CM

## 2025-02-06 LAB
ALBUMIN SERPL BCP-MCNC: 3.7 G/DL (ref 3.5–5.2)
ALP SERPL-CCNC: 75 U/L (ref 40–150)
ALT SERPL W/O P-5'-P-CCNC: 27 U/L (ref 10–44)
ANION GAP SERPL CALC-SCNC: 8 MMOL/L (ref 8–16)
AST SERPL-CCNC: 19 U/L (ref 10–40)
BASOPHILS # BLD AUTO: 0.1 K/UL (ref 0–0.2)
BASOPHILS NFR BLD: 0.8 % (ref 0–1.9)
BILIRUB SERPL-MCNC: 0.3 MG/DL (ref 0.1–1)
BUN SERPL-MCNC: 13 MG/DL (ref 6–20)
CALCIUM SERPL-MCNC: 9.4 MG/DL (ref 8.7–10.5)
CHLORIDE SERPL-SCNC: 106 MMOL/L (ref 95–110)
CHOLEST SERPL-MCNC: 144 MG/DL (ref 120–199)
CHOLEST/HDLC SERPL: 3.1 {RATIO} (ref 2–5)
CO2 SERPL-SCNC: 26 MMOL/L (ref 23–29)
CREAT SERPL-MCNC: 0.8 MG/DL (ref 0.5–1.4)
DIFFERENTIAL METHOD BLD: ABNORMAL
EOSINOPHIL # BLD AUTO: 0.4 K/UL (ref 0–0.5)
EOSINOPHIL NFR BLD: 2.8 % (ref 0–8)
ERYTHROCYTE [DISTWIDTH] IN BLOOD BY AUTOMATED COUNT: 14.4 % (ref 11.5–14.5)
EST. GFR  (NO RACE VARIABLE): >60 ML/MIN/1.73 M^2
ESTIMATED AVG GLUCOSE: 128 MG/DL (ref 68–131)
GLUCOSE SERPL-MCNC: 98 MG/DL (ref 70–110)
HBA1C MFR BLD: 6.1 % (ref 4–5.6)
HCT VFR BLD AUTO: 44 % (ref 37–48.5)
HDLC SERPL-MCNC: 46 MG/DL (ref 40–75)
HDLC SERPL: 31.9 % (ref 20–50)
HGB BLD-MCNC: 14.2 G/DL (ref 12–16)
IMM GRANULOCYTES # BLD AUTO: 0.07 K/UL (ref 0–0.04)
IMM GRANULOCYTES NFR BLD AUTO: 0.5 % (ref 0–0.5)
LDLC SERPL CALC-MCNC: 73.8 MG/DL (ref 63–159)
LYMPHOCYTES # BLD AUTO: 4.6 K/UL (ref 1–4.8)
LYMPHOCYTES NFR BLD: 35.2 % (ref 18–48)
MCH RBC QN AUTO: 30 PG (ref 27–31)
MCHC RBC AUTO-ENTMCNC: 32.3 G/DL (ref 32–36)
MCV RBC AUTO: 93 FL (ref 82–98)
MONOCYTES # BLD AUTO: 1 K/UL (ref 0.3–1)
MONOCYTES NFR BLD: 7.6 % (ref 4–15)
NEUTROPHILS # BLD AUTO: 6.9 K/UL (ref 1.8–7.7)
NEUTROPHILS NFR BLD: 53.1 % (ref 38–73)
NONHDLC SERPL-MCNC: 98 MG/DL
NRBC BLD-RTO: 0 /100 WBC
PLATELET # BLD AUTO: 361 K/UL (ref 150–450)
PMV BLD AUTO: 10.4 FL (ref 9.2–12.9)
POTASSIUM SERPL-SCNC: 4 MMOL/L (ref 3.5–5.1)
PROT SERPL-MCNC: 7.3 G/DL (ref 6–8.4)
RBC # BLD AUTO: 4.74 M/UL (ref 4–5.4)
SODIUM SERPL-SCNC: 140 MMOL/L (ref 136–145)
TRIGL SERPL-MCNC: 121 MG/DL (ref 30–150)
WBC # BLD AUTO: 12.92 K/UL (ref 3.9–12.7)

## 2025-02-06 PROCEDURE — 85025 COMPLETE CBC W/AUTO DIFF WBC: CPT | Performed by: STUDENT IN AN ORGANIZED HEALTH CARE EDUCATION/TRAINING PROGRAM

## 2025-02-06 PROCEDURE — 80053 COMPREHEN METABOLIC PANEL: CPT | Performed by: STUDENT IN AN ORGANIZED HEALTH CARE EDUCATION/TRAINING PROGRAM

## 2025-02-06 PROCEDURE — 80061 LIPID PANEL: CPT | Performed by: STUDENT IN AN ORGANIZED HEALTH CARE EDUCATION/TRAINING PROGRAM

## 2025-02-06 PROCEDURE — 83036 HEMOGLOBIN GLYCOSYLATED A1C: CPT | Performed by: STUDENT IN AN ORGANIZED HEALTH CARE EDUCATION/TRAINING PROGRAM

## 2025-02-06 PROCEDURE — 36415 COLL VENOUS BLD VENIPUNCTURE: CPT | Mod: PN | Performed by: STUDENT IN AN ORGANIZED HEALTH CARE EDUCATION/TRAINING PROGRAM

## 2025-02-19 ENCOUNTER — PATIENT MESSAGE (OUTPATIENT)
Dept: RHEUMATOLOGY | Facility: CLINIC | Age: 55
End: 2025-02-19
Payer: COMMERCIAL

## 2025-02-19 ENCOUNTER — PATIENT MESSAGE (OUTPATIENT)
Dept: FAMILY MEDICINE | Facility: CLINIC | Age: 55
End: 2025-02-19

## 2025-02-19 ENCOUNTER — LAB VISIT (OUTPATIENT)
Dept: LAB | Facility: HOSPITAL | Age: 55
End: 2025-02-19
Attending: STUDENT IN AN ORGANIZED HEALTH CARE EDUCATION/TRAINING PROGRAM
Payer: COMMERCIAL

## 2025-02-19 ENCOUNTER — OFFICE VISIT (OUTPATIENT)
Dept: FAMILY MEDICINE | Facility: CLINIC | Age: 55
End: 2025-02-19
Payer: COMMERCIAL

## 2025-02-19 VITALS
OXYGEN SATURATION: 98 % | DIASTOLIC BLOOD PRESSURE: 80 MMHG | HEIGHT: 64 IN | WEIGHT: 136.44 LBS | HEART RATE: 86 BPM | BODY MASS INDEX: 23.29 KG/M2 | SYSTOLIC BLOOD PRESSURE: 152 MMHG

## 2025-02-19 DIAGNOSIS — I10 ESSENTIAL HYPERTENSION: ICD-10-CM

## 2025-02-19 DIAGNOSIS — F51.04 PSYCHOPHYSIOLOGICAL INSOMNIA: ICD-10-CM

## 2025-02-19 DIAGNOSIS — D72.829 LEUKOCYTOSIS, UNSPECIFIED TYPE: ICD-10-CM

## 2025-02-19 DIAGNOSIS — M34.9 SCLERODERMA: ICD-10-CM

## 2025-02-19 DIAGNOSIS — E11.40 TYPE 2 DIABETES MELLITUS WITH DIABETIC NEUROPATHY, WITHOUT LONG-TERM CURRENT USE OF INSULIN: ICD-10-CM

## 2025-02-19 DIAGNOSIS — E11.40 TYPE 2 DIABETES MELLITUS WITH DIABETIC NEUROPATHY, WITHOUT LONG-TERM CURRENT USE OF INSULIN: Primary | ICD-10-CM

## 2025-02-19 DIAGNOSIS — E78.2 MIXED HYPERLIPIDEMIA: ICD-10-CM

## 2025-02-19 DIAGNOSIS — D72.829 LEUKOCYTOSIS, UNSPECIFIED TYPE: Primary | ICD-10-CM

## 2025-02-19 LAB
ALBUMIN/CREAT UR: 52.9 UG/MG (ref 0–30)
CREAT UR-MCNC: 85 MG/DL (ref 15–325)
MICROALBUMIN UR DL<=1MG/L-MCNC: 45 UG/ML

## 2025-02-19 PROCEDURE — 82570 ASSAY OF URINE CREATININE: CPT | Performed by: STUDENT IN AN ORGANIZED HEALTH CARE EDUCATION/TRAINING PROGRAM

## 2025-02-19 RX ORDER — HYDROCHLOROTHIAZIDE 25 MG/1
25 TABLET ORAL DAILY
Qty: 90 TABLET | Refills: 3 | Status: SHIPPED | OUTPATIENT
Start: 2025-02-19

## 2025-02-19 NOTE — ASSESSMENT & PLAN NOTE
Her related joint pains have improved with the meloxicam and methocarbamol. Continue such. We also discussed Voltaren gel. I will talk to rheumatology about availability as there was limited availability when she last talked to them.

## 2025-02-19 NOTE — ASSESSMENT & PLAN NOTE
Poorly controlled. Reviewed sleep behaviors - see After Visit Summary. Discussed over-the-counter medications such as melatonin and Benadryl. If no improvement, consider short course of something to reset sleep pattern. Additionally, her pain sometimes wakes her up so we will need to treat that.

## 2025-02-19 NOTE — PROGRESS NOTES
Name: Kylee Bugros  MRN: [id number]  : 1970    Subjective   HPI  Patient presents for regular follow up.     Review of Systems   Constitutional:  Positive for fatigue (daytime somnolence). Negative for diaphoresis and unexpected weight change.   Psychiatric/Behavioral:  Positive for sleep disturbance (Issues falling asleep and staying asleep).         Problem List[1]    Medications Ordered Prior to Encounter[2]    Past Medical History:   Diagnosis Date    Scleroderma        Past Surgical History:   Procedure Laterality Date     SECTION      HYSTERECTOMY          Family History   Problem Relation Name Age of Onset    Heart disease Mother      Hypertension Mother      Lupus Paternal Aunt      Glaucoma Neg Hx      Macular degeneration Neg Hx         Social History[3]    Review of patient's allergies indicates:  No Known Allergies     Health Maintenance Due   Topic Date Due    TETANUS VACCINE  Never done    Pneumococcal Vaccines (Age 50+) (1 of 2 - PCV) Never done    Colorectal Cancer Screening  Never done    Shingles Vaccine (1 of 2) Never done    Diabetic Eye Exam  2024    Influenza Vaccine (1) Never done    COVID-19 Vaccine (3 - 2024-25 season) 2024    Mammogram  2024    Diabetes Urine Screening  2025    Foot Exam  2025       Objective   Vitals:    25 0829   BP: (!) 152/80   Pulse:         Physical Exam  Constitutional:       General: She is not in acute distress.     Appearance: Normal appearance. She is well-developed.   HENT:      Head: Normocephalic and atraumatic.      Right Ear: External ear normal.      Left Ear: External ear normal.   Eyes:      Conjunctiva/sclera: Conjunctivae normal.   Cardiovascular:      Rate and Rhythm: Regular rhythm.   Pulmonary:      Effort: Pulmonary effort is normal. No respiratory distress.   Abdominal:      General: Abdomen is flat. There is no distension.   Musculoskeletal:         General: No swelling or deformity.      Right  lower leg: No edema.      Left lower leg: No edema.   Skin:     General: Skin is warm and dry.      Coloration: Skin is not jaundiced.   Neurological:      Mental Status: She is alert and oriented to person, place, and time. Mental status is at baseline.   Psychiatric:         Attention and Perception: Attention and perception normal.         Mood and Affect: Mood normal.         Speech: Speech normal.         Behavior: Behavior normal. Behavior is cooperative.         Thought Content: Thought content normal.         Cognition and Memory: Cognition normal.         Judgment: Judgment normal.          Assessment & Plan   1. Type 2 diabetes mellitus with diabetic neuropathy, without long-term current use of insulin  Assessment & Plan:  Recent A1C was 6.1% - well controlled. Continue current medications.    Orders:  -     Microalbumin/Creatinine Ratio, Urine; Future    2. Essential hypertension  Assessment & Plan:  Elevated. Increase hydrochlorothiazide to 25mg. Continue lisinopril 40mg. Nurse visit in a few weeks for blood pressure check.    Orders:  -     hydroCHLOROthiazide (HYDRODIURIL) 25 MG tablet; Take 1 tablet (25 mg total) by mouth once daily.  Dispense: 90 tablet; Refill: 3    3. Psychophysiological insomnia  Assessment & Plan:  Poorly controlled. Reviewed sleep behaviors - see After Visit Summary. Discussed over-the-counter medications such as melatonin and Benadryl. If no improvement, consider short course of something to reset sleep pattern. Additionally, her pain sometimes wakes her up so we will need to treat that.      4. Scleroderma  Assessment & Plan:  Her related joint pains have improved with the meloxicam and methocarbamol. Continue such. We also discussed Voltaren gel. I will talk to rheumatology about availability as there was limited availability when she last talked to them.      5. Mixed hyperlipidemia  Assessment & Plan:  Well controlled based on recent lab work. Continue pravastatin.      6.  Leukocytosis, unspecified type  Assessment & Plan:  Persistent but stable elevation of white blood cell count. Peripheral smear in 2020 showed occasional atypical lymphocyte, otherwise normal. Consider return visits to hematology.          Follow up in 4 months.     Hector Gallardo MD  02/19/2025          [1]   Patient Active Problem List  Diagnosis    Multiple joint pain    Hand numbness    Vitamin D deficiency disease    Fibromyalgia    Anxiety    Essential hypertension    Type 2 diabetes mellitus with diabetic neuropathy, without long-term current use of insulin    Mixed hyperlipidemia    Antalgic gait    Decreased range of motion of right ankle    Weakness of right lower extremity    Scleroderma    Cigarette nicotine dependence without complication    Psychophysiological insomnia    Leukocytosis   [2]   Current Outpatient Medications on File Prior to Visit   Medication Sig Dispense Refill    dulaglutide (TRULICITY) 0.75 mg/0.5 mL pen injector Inject 0.75 mg into the skin every 7 days. 12 Pen 3    ergocalciferol (ERGOCALCIFEROL) 50,000 unit Cap Take 1 capsule (50,000 Units total) by mouth every 7 days. 12 capsule 3    ibuprofen (ADVIL,MOTRIN) 200 MG tablet Take 200 mg by mouth every 6 (six) hours as needed for Pain.      lisinopriL (PRINIVIL,ZESTRIL) 40 MG tablet Take 1 tablet (40 mg total) by mouth once daily. 90 tablet 3    meloxicam (MOBIC) 15 MG tablet Take 1 tablet (15 mg total) by mouth once daily. 30 tablet 11    metFORMIN (GLUCOPHAGE-XR) 500 MG ER 24hr tablet Take 1 tablet (500 mg total) by mouth 2 (two) times daily with meals. 180 tablet 2    methocarbamoL (ROBAXIN) 500 MG Tab Take 1 tablet (500 mg total) by mouth every evening. 30 tablet 11    ondansetron (ZOFRAN-ODT) 8 MG TbDL Take 1 tablet (8 mg total) by mouth every 8 (eight) hours as needed (for nausea). 20 tablet 1    pravastatin (PRAVACHOL) 20 MG tablet Take 1 tablet (20 mg total) by mouth every evening. 90 tablet 3    [DISCONTINUED]  "hydroCHLOROthiazide (HYDRODIURIL) 12.5 MG Tab Take 1 tablet (12.5 mg total) by mouth once daily. 90 tablet 3    BD INSULIN PEN NEEDLE UF MINI 31 gauge x 3/16" Ndle       blood sugar diagnostic Strp To check BG 1 time daily, to use with insurance preferred meter 100 each 11    blood-glucose meter kit To check BG 1 time daily, to use with insurance preferred meter 1 each 0    lancets 33 gauge Misc 1 lancet by Misc.(Non-Drug; Combo Route) route 4 (four) times daily.      lancets Misc To check BG 1 time daily, to use with insurance preferred meter 100 each 11    ONETOUCH VERIO TEST STRIPS Strp USE TO TEST ONCE DAILY 100 strip 2     No current facility-administered medications on file prior to visit.   [3]   Social History  Socioeconomic History    Marital status:    Tobacco Use    Smoking status: Every Day     Current packs/day: 0.50     Average packs/day: 0.5 packs/day for 38.1 years (19.1 ttl pk-yrs)     Types: Cigarettes     Start date: 1987    Smokeless tobacco: Never   Substance and Sexual Activity    Alcohol use: Yes     Comment: socially    Drug use: No    Sexual activity: Yes     Partners: Male     Social Drivers of Health     Financial Resource Strain: Low Risk  (7/11/2023)    Overall Financial Resource Strain (CARDIA)     Difficulty of Paying Living Expenses: Not very hard   Food Insecurity: No Food Insecurity (7/11/2023)    Hunger Vital Sign     Worried About Running Out of Food in the Last Year: Never true     Ran Out of Food in the Last Year: Never true   Transportation Needs: No Transportation Needs (7/11/2023)    PRAPARE - Transportation     Lack of Transportation (Medical): No     Lack of Transportation (Non-Medical): No   Physical Activity: Unknown (7/11/2023)    Exercise Vital Sign     Days of Exercise per Week: Patient declined   Stress: Stress Concern Present (7/11/2023)    [employer] - Occupational Stress Questionnaire     Feeling of Stress : To some extent "   Housing Stability: High Risk (7/21/2022)    Housing Stability Vital Sign     Unable to Pay for Housing in the Last Year: No     Unstable Housing in the Last Year: Yes

## 2025-02-19 NOTE — PATIENT INSTRUCTIONS
"For more information on insomnia and how to treat it, you can visit this web page provided by the American Academy of Sleep Medicine:  https://sleepeducation.org/sleep-disorders/insomnia/    To improve your sleep without medication, you can read "Say Good Night to Insomnia: The Six Week, Drug-Free Program Developed at [facility]" by Colton Oliver. This book's methods are based on cognitive behavioral therapy, the first line treatment for insomnia.  https://www.[employer]/Say-Good-Night-Insomnia-Drug-Free/dp/9048972803    If you would like a smart phone waqas that simulates cognitive behavioral therapy, you can download [employer].  https://www.sleepio.com/      I recommend 150 minutes of moderate intensity exercise per week. Target heart rate for cardiovascular exercise is between 100 and 130 beats per minute.  "

## 2025-02-19 NOTE — Clinical Note
This patient has a referral placed for scleroderma. When I first placed the referral, availability was limited. Is there any space for the patient at this time?

## 2025-02-19 NOTE — ASSESSMENT & PLAN NOTE
Elevated. Increase hydrochlorothiazide to 25mg. Continue lisinopril 40mg. Nurse visit in a few weeks for blood pressure check.

## 2025-02-19 NOTE — ASSESSMENT & PLAN NOTE
Persistent but stable elevation of white blood cell count. Peripheral smear in 2020 showed occasional atypical lymphocyte, otherwise normal. Consider return visits to hematology.

## 2025-02-21 ENCOUNTER — TELEPHONE (OUTPATIENT)
Dept: HEMATOLOGY/ONCOLOGY | Facility: CLINIC | Age: 55
End: 2025-02-21
Payer: COMMERCIAL

## 2025-02-23 ENCOUNTER — PATIENT MESSAGE (OUTPATIENT)
Dept: FAMILY MEDICINE | Facility: CLINIC | Age: 55
End: 2025-02-23
Payer: COMMERCIAL

## 2025-03-05 DIAGNOSIS — Z12.31 OTHER SCREENING MAMMOGRAM: ICD-10-CM

## 2025-03-07 ENCOUNTER — PATIENT MESSAGE (OUTPATIENT)
Dept: ADMINISTRATIVE | Facility: HOSPITAL | Age: 55
End: 2025-03-07
Payer: COMMERCIAL

## 2025-03-13 ENCOUNTER — PATIENT MESSAGE (OUTPATIENT)
Dept: FAMILY MEDICINE | Facility: CLINIC | Age: 55
End: 2025-03-13
Payer: COMMERCIAL

## 2025-03-13 DIAGNOSIS — E11.40 TYPE 2 DIABETES MELLITUS WITH DIABETIC NEUROPATHY, WITHOUT LONG-TERM CURRENT USE OF INSULIN: ICD-10-CM

## 2025-03-17 DIAGNOSIS — E11.40 TYPE 2 DIABETES MELLITUS WITH DIABETIC NEUROPATHY, WITHOUT LONG-TERM CURRENT USE OF INSULIN: ICD-10-CM

## 2025-03-17 RX ORDER — DULAGLUTIDE 0.75 MG/.5ML
0.75 INJECTION, SOLUTION SUBCUTANEOUS
Qty: 12 PEN | Refills: 3 | OUTPATIENT
Start: 2025-03-17

## 2025-03-18 RX ORDER — DULAGLUTIDE 0.75 MG/.5ML
0.75 INJECTION, SOLUTION SUBCUTANEOUS
Qty: 12 PEN | Refills: 1 | Status: SHIPPED | OUTPATIENT
Start: 2025-03-18

## 2025-03-18 NOTE — TELEPHONE ENCOUNTER
Refill Decision Note   Kylee Burgos  is requesting a refill authorization.  Brief Assessment and Rationale for Refill:  Approve     Medication Therapy Plan:         Comments:     Note composed:8:28 AM 03/18/2025

## 2025-03-18 NOTE — TELEPHONE ENCOUNTER
Care Due:                  Date            Visit Type   Department     Provider  --------------------------------------------------------------------------------                                EP -                              [facility] FAMILY  Last Visit: 02-      CARE ([facility])   MEDICINE       Hector Gallardo                              EP -                              PRIMARY      [facility]  Next Visit: 06-      CARE ([facility])   MEDICINE       Hector Gallardo                                                            Last  Test          Frequency    Reason                     Performed    Due Date  --------------------------------------------------------------------------------    Vitamin D...  12 months..  ergocalciferol...........  07- 07-    [facility] Embedded Care Due Messages. Reference number: [id number].   3/18/2025 8:28:54 AM CDT

## 2025-03-19 ENCOUNTER — TELEPHONE (OUTPATIENT)
Dept: FAMILY MEDICINE | Facility: CLINIC | Age: 55
End: 2025-03-19

## 2025-03-19 ENCOUNTER — CLINICAL SUPPORT (OUTPATIENT)
Dept: FAMILY MEDICINE | Facility: CLINIC | Age: 55
End: 2025-03-19
Payer: COMMERCIAL

## 2025-03-19 VITALS
DIASTOLIC BLOOD PRESSURE: 82 MMHG | RESPIRATION RATE: 18 BRPM | HEART RATE: 76 BPM | OXYGEN SATURATION: 98 % | SYSTOLIC BLOOD PRESSURE: 136 MMHG

## 2025-03-19 DIAGNOSIS — Z01.30 BLOOD PRESSURE CHECK: Primary | ICD-10-CM

## 2025-03-19 DIAGNOSIS — K64.9 HEMORRHOIDS, UNSPECIFIED HEMORRHOID TYPE: Primary | ICD-10-CM

## 2025-03-19 PROCEDURE — 99999 PR PBB SHADOW E&M-EST. PATIENT-LVL II: CPT | Mod: PBBFAC,,,

## 2025-03-19 PROCEDURE — 99499 UNLISTED E&M SERVICE: CPT | Mod: S$GLB,,, | Performed by: STUDENT IN AN ORGANIZED HEALTH CARE EDUCATION/TRAINING PROGRAM

## 2025-03-19 NOTE — PROGRESS NOTES
"Kylee Burgos 54 y.o. female is here today for Blood Pressure check.   History of HTN yes.    Review of patient's allergies indicates:  No Known Allergies  Creatinine   Date Value Ref Range Status   02/06/2025 0.8 0.5 - 1.4 mg/dL Final     Sodium   Date Value Ref Range Status   02/06/2025 140 136 - 145 mmol/L Final     Potassium   Date Value Ref Range Status   02/06/2025 4.0 3.5 - 5.1 mmol/L Final   ]  Patient verifies taking blood pressure medications on a regular basis at the same time of the day.   Current Medications[1]  Does patient have record of home blood pressure readings no. Readings have been averaging n/a.   Pt. Denies having home blood pressure monitor.  Last dose of blood pressure medication was taken at 7:45 AM today.   Patient is symptomatic.   Complains of intermittent headaches.    BP: 136/82 , Pulse: 76 .    Blood pressure reading after 15 minutes was n/a, Pulse n/a.  Dr. Hector Gallardo notified.           [1]   Current Outpatient Medications:     BD INSULIN PEN NEEDLE UF MINI 31 gauge x 3/16" Ndle, , Disp: , Rfl:     blood sugar diagnostic Strp, To check BG 1 time daily, to use with insurance preferred meter, Disp: 100 each, Rfl: 11    blood-glucose meter kit, To check BG 1 time daily, to use with insurance preferred meter, Disp: 1 each, Rfl: 0    ergocalciferol (ERGOCALCIFEROL) 50,000 unit Cap, Take 1 capsule (50,000 Units total) by mouth every 7 days., Disp: 12 capsule, Rfl: 3    hydroCHLOROthiazide (HYDRODIURIL) 25 MG tablet, Take 1 tablet (25 mg total) by mouth once daily., Disp: 90 tablet, Rfl: 3    ibuprofen (ADVIL,MOTRIN) 200 MG tablet, Take 200 mg by mouth every 6 (six) hours as needed for Pain., Disp: , Rfl:     lancets 33 gauge Misc, 1 lancet by Misc.(Non-Drug; Combo Route) route 4 (four) times daily., Disp: , Rfl:     lancets Misc, To check BG 1 time daily, to use with insurance preferred meter, Disp: 100 each, Rfl: 11    lisinopriL (PRINIVIL,ZESTRIL) 40 MG tablet, Take 1 " tablet (40 mg total) by mouth once daily., Disp: 90 tablet, Rfl: 3    meloxicam (MOBIC) 15 MG tablet, Take 1 tablet (15 mg total) by mouth once daily., Disp: 30 tablet, Rfl: 11    metFORMIN (GLUCOPHAGE-XR) 500 MG ER 24hr tablet, Take 1 tablet (500 mg total) by mouth 2 (two) times daily with meals., Disp: 180 tablet, Rfl: 2    methocarbamoL (ROBAXIN) 500 MG Tab, Take 1 tablet (500 mg total) by mouth every evening., Disp: 30 tablet, Rfl: 11    ondansetron (ZOFRAN-ODT) 8 MG TbDL, Take 1 tablet (8 mg total) by mouth every 8 (eight) hours as needed (for nausea)., Disp: 20 tablet, Rfl: 1    ONETOUCH VERIO TEST STRIPS Strp, USE TO TEST ONCE DAILY, Disp: 100 strip, Rfl: 2    pravastatin (PRAVACHOL) 20 MG tablet, Take 1 tablet (20 mg total) by mouth every evening., Disp: 90 tablet, Rfl: 3    TRULICITY 0.75 mg/0.5 mL pen injector, ADMINISTER 0.75 MG UNDER THE SKIN EVERY 7 DAYS, Disp: 12 pen , Rfl: 1

## 2025-03-19 NOTE — TELEPHONE ENCOUNTER
Description consistent with hemorrhoids. I have sent a phenylephrine suppository which should help with bleeding. I advise she take laxatives if she is having any issues with constipation. I also advise she limit the amount of time spent on the toilet trying to defecate. She can take Sitz baths is symptomatic. If no improvement after 3 weeks, we may need to see her in clinic.

## 2025-03-19 NOTE — Clinical Note
Pt. Seen in clinic for bp check. Her initial reading was wnr.  Please see clinical support note for details.

## 2025-03-19 NOTE — TELEPHONE ENCOUNTER
Pt. Seen in clinic for bp check.  During visit she reports having slight bright red blood tinged stool with bulging noted around anus.  Assured her that provider will be notified.  PtTiti Sharpe.

## 2025-04-08 ENCOUNTER — OFFICE VISIT (OUTPATIENT)
Dept: RHEUMATOLOGY | Facility: CLINIC | Age: 55
End: 2025-04-08
Payer: COMMERCIAL

## 2025-04-08 ENCOUNTER — OFFICE VISIT (OUTPATIENT)
Dept: HEMATOLOGY/ONCOLOGY | Facility: CLINIC | Age: 55
End: 2025-04-08
Payer: COMMERCIAL

## 2025-04-08 VITALS
DIASTOLIC BLOOD PRESSURE: 92 MMHG | HEART RATE: 81 BPM | BODY MASS INDEX: 23.18 KG/M2 | WEIGHT: 135.81 LBS | SYSTOLIC BLOOD PRESSURE: 159 MMHG | HEIGHT: 64 IN

## 2025-04-08 DIAGNOSIS — L94.1 LINEAR SCLERODERMA: Primary | ICD-10-CM

## 2025-04-08 DIAGNOSIS — M79.7 FIBROMYALGIA: ICD-10-CM

## 2025-04-08 DIAGNOSIS — D72.829 LEUKOCYTOSIS, UNSPECIFIED TYPE: Primary | ICD-10-CM

## 2025-04-08 DIAGNOSIS — F17.210 CIGARETTE NICOTINE DEPENDENCE WITHOUT COMPLICATION: ICD-10-CM

## 2025-04-08 PROCEDURE — 4010F ACE/ARB THERAPY RXD/TAKEN: CPT | Mod: CPTII,S$GLB,, | Performed by: INTERNAL MEDICINE

## 2025-04-08 PROCEDURE — 3066F NEPHROPATHY DOC TX: CPT | Mod: CPTII,S$GLB,, | Performed by: INTERNAL MEDICINE

## 2025-04-08 PROCEDURE — 3008F BODY MASS INDEX DOCD: CPT | Mod: CPTII,S$GLB,, | Performed by: INTERNAL MEDICINE

## 2025-04-08 PROCEDURE — 99999 PR PBB SHADOW E&M-EST. PATIENT-LVL IV: CPT | Mod: PBBFAC,,, | Performed by: INTERNAL MEDICINE

## 2025-04-08 PROCEDURE — 3077F SYST BP >= 140 MM HG: CPT | Mod: CPTII,S$GLB,, | Performed by: INTERNAL MEDICINE

## 2025-04-08 PROCEDURE — 99205 OFFICE O/P NEW HI 60 MIN: CPT | Mod: S$GLB,,, | Performed by: INTERNAL MEDICINE

## 2025-04-08 PROCEDURE — 3060F POS MICROALBUMINURIA REV: CPT | Mod: CPTII,S$GLB,, | Performed by: INTERNAL MEDICINE

## 2025-04-08 PROCEDURE — 3044F HG A1C LEVEL LT 7.0%: CPT | Mod: CPTII,S$GLB,, | Performed by: INTERNAL MEDICINE

## 2025-04-08 PROCEDURE — 3080F DIAST BP >= 90 MM HG: CPT | Mod: CPTII,S$GLB,, | Performed by: INTERNAL MEDICINE

## 2025-04-08 RX ORDER — SPIRONOLACTONE 100 MG/1
100 TABLET, FILM COATED ORAL
COMMUNITY
Start: 2025-02-21

## 2025-04-08 RX ORDER — PREGABALIN 50 MG/1
50 CAPSULE ORAL 2 TIMES DAILY
Qty: 90 CAPSULE | Refills: 4 | Status: SHIPPED | OUTPATIENT
Start: 2025-04-08 | End: 2026-04-08

## 2025-04-08 ASSESSMENT — ROUTINE ASSESSMENT OF PATIENT INDEX DATA (RAPID3)
TOTAL RAPID3 SCORE: 5.39
FATIGUE SCORE: 2.2
PSYCHOLOGICAL DISTRESS SCORE: 1.1
PATIENT GLOBAL ASSESSMENT SCORE: 5
MDHAQ FUNCTION SCORE: 0.5
PAIN SCORE: 9.5

## 2025-04-08 NOTE — PROGRESS NOTES
CONSULT TELEMEDICINE VISIT    Subjective:      Patient ID: Kylee Burgos is a 54 y.o. female.  MRN: [id number]  : 1970    An audio and visual care visit was performed with the patient because of the COVID-19 pandemic recommendations for social distancing.    TELEMEDICINE  The patient location is: home  The chief complaint leading to consultation is: Leukocytosis  Visit type: Virtual visit with synchronous audio and video    Total time spent with patient: 7 minutes  46 minutes of total time spent on the encounter, which includes face to face time and non-face to face time preparing to see the patient (eg, review of tests), obtaining and/or reviewing separately obtained history, documenting clinical information in the electronic or other health record, independently interpreting results (not separately reported) and communicating results to the patient/family/caregiver, or care coordination (not separately reported).    Each patient to whom he or she provides medical services by telemedicine is:  (1) informed of the relationship between the physician and patient and the respective role of any other health care provider with respect to management of the patient; and (2) notified that he or she may decline to receive medical services by telemedicine and may withdraw from such care at any time.    History of Present Illness:   JE Quiñonez is a 53 yo female presenting for evaluation of her leukocytosis.  She is reporting to this visit by herself    The patient had blood workup done on 2025  showed a white count of 12.92 with a hemoglobin of 14.2 platelet count of 361 K and a normal differential.  Looking at her blood workup going to 11 years ago her white count has been chronically elevated ranging between 12.93 and 16.31 with a neutrophilic shift.  Her platelet count also has been chronically elevated ranging between 383K-492 K.    She was seen previously by   in  for the same  reason.  Her leukocytosis was considered to be reactive.    The patient is a chronic smoker for many years.  She smokes  a half to 1 pack a day.  She was recently seen by Rheumatology for her scleroderma and she was started on Lyrica.  She denies the chronic use of steroid.  She denies any family history of blood related malignancies.    The patient denies CP, cough, SOB, abdominal pain, nausea, vomiting, constipation.  The patient denies fever, chills, night sweats, weight loss, new lumps or bumps, easy bruising or bleeding.    Oncology History:  Oncology History    No history exists.      Past medical, surgical, family, and social history were reviewed today and there are no changes of note unless mentioned in HPI.   MEDS and ALLERGIES were reviewed with patient and meds reconciled.     History:  Past Medical History:   Diagnosis Date    Scleroderma       Past Surgical History:   Procedure Laterality Date     SECTION      HYSTERECTOMY       Family History   Problem Relation Name Age of Onset    Heart disease Mother      Hypertension Mother      Lupus Paternal Aunt      Glaucoma Neg Hx      Macular degeneration Neg Hx        Social History     Tobacco Use    Smoking status: Every Day     Current packs/day: 0.50     Average packs/day: 0.5 packs/day for 38.3 years (19.1 ttl pk-yrs)     Types: Cigarettes     Start date:     Smokeless tobacco: Never   Substance and Sexual Activity    Alcohol use: Yes     Comment: socially    Drug use: No    Sexual activity: Yes     Partners: Male        ROS:  Answers submitted by the patient for this visit:  Review of Systems Questionnaire (Submitted on 2025)  appetite change : No  unexpected weight change: No  mouth sores: No  visual disturbance: Yes  cough: No  shortness of breath: No  chest pain: No  abdominal pain: Yes  diarrhea: No  frequency: Yes  back pain: No  rash: No  headaches: No  adenopathy: No  nervous/ anxious: No      Objective:   There were no vitals  filed for this visit.  Wt Readings from Last 10 Encounters:   04/08/25 61.6 kg (135 lb 12.9 oz)   02/19/25 61.9 kg (136 lb 7.4 oz)   10/10/24 61.9 kg (136 lb 7.4 oz)   08/19/24 60.6 kg (133 lb 9.6 oz)   06/17/24 58.4 kg (128 lb 12 oz)   02/19/24 58.3 kg (128 lb 8.5 oz)   11/16/23 59.5 kg (131 lb 2.8 oz)   09/08/23 61.6 kg (135 lb 12.9 oz)   08/29/23 62.1 kg (137 lb)   08/17/23 62.2 kg (137 lb 2 oz)       Physical Examination:   Constitutional: she is alert, pleasant, and does not appear to be in any physical distress   HENT: Mouth/Throat:  Tongue is midline without evidence of glossitis  Eyes: No obvious jaundice or conjunctivitis.  EOM are normal.   Pulmonary/Chest: No stridor noted. No excess chest muscle movement.  Neurological: she is alert and oriented to person, place, and time. No cranial nerve deficit.  Skin:  No rash noted. No erythema.   Psychiatric: she has a normal mood and affect. Speech and memory normal.     Diagnostic Tests:  Significant Imaging:  I have reviewed and interpreted all pertinent imaging results/findings.    Laboratory Data:  Lab Results   Component Value Date    WBC 12.92 (H) 02/06/2025    HGB 14.2 02/06/2025    HCT 44.0 02/06/2025     02/06/2025    CHOL 144 02/06/2025    TRIG 121 02/06/2025    HDL 46 02/06/2025    ALT 27 02/06/2025    AST 19 02/06/2025     02/06/2025    K 4.0 02/06/2025     02/06/2025    CREATININE 0.8 02/06/2025    BUN 13 02/06/2025    CO2 26 02/06/2025    TSH 1.410 07/07/2023    HGBA1C 6.1 (H) 02/06/2025        Labs:   Lab Results   Component Value Date    WBC 12.92 (H) 02/06/2025    RBC 4.74 02/06/2025    HGB 14.2 02/06/2025    HCT 44.0 02/06/2025    MCV 93 02/06/2025     02/06/2025    GLU 98 02/06/2025     02/06/2025    K 4.0 02/06/2025    BUN 13 02/06/2025    CREATININE 0.8 02/06/2025    AST 19 02/06/2025    ALT 27 02/06/2025    BILITOT 0.3 02/06/2025         Assessment/Plan:     ECOG SCORE    0 - Fully active-able to carry on all  pre-disease performance without restriction       Leukocytosis.    I reviewed the patient laboratory findings.  The differential diagnosis of thrombocytopenia was reviewed at length and this include but not restricted to her polypharmacy,  autoimmune disorder vitamin deficiency infectious etiology  bone marrow process and thyroid dysfunction.    Most likely the patient has chronic leukocytosis with a neutrophilic shift secondary to her chronic smoking.  The patient will have blood workup drawn today for CBC peripheral smear review  CMP ESR LDH SPEP with immunofixation TSH free T4     She will be seen back again in 2 weeks to discuss the results of his workup in the next step in his management.           Tobacco abuse.    The patient was highly advised to quit smoking.      Med Onc Chart Routing      Follow up with physician 3 weeks. Virtual with repeat CBC CMP ESR LDH SPEP with immunofixation peripheral smear review TSH and free T4   Follow up with [facility]    Infusion scheduling note    Injection scheduling note    Labs    Imaging    Pharmacy appointment    Other referrals                   Plan was discussed with the patient at length, and she verbalized understanding. Kylee was given an opportunity to ask questions that were answered to her satisfaction, and she was advised to call in the interval if any problems or questions arise.  Discussed COVID-19 and social distancing in great detail, avoid all non-essential visits out of the home if possible and avoid sick contacts.     Electronically signed by Rain Davidson MD

## 2025-04-08 NOTE — PROGRESS NOTES
Subjective:          Chief Complaint: Kylee Burgos is a 54 y.o. female who had concerns including Scleroderma.    HPI:    Patient is a 54-year-old female primary diagnosis fibromyalgia with a secondary diagnosis of linear scleroderma.  I was able to find some older records from 2014 indicating a localized scleroderma also known as morphea of the right middle finger at the age of 4 this typically known as en coup de sabre  rarely progresses to systemic scleroderma.  Other by history review appears to have been a diagnosis possibly of rheumatoid arthritis in 2011.    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   SHANE negative, SSA and SSB negative, Scl 70- Smith antibody negative RNP negative myositis panel negative CCP antibody negative and rheumatoid factor negative CPKs were normal inflammatory markers were within normal limit.  She was given Cymbalta for fibro treatment      2017 seen with Dr. Franko Patel.  Primary complaint at this visit felt to be mostly around fibromyalgia with diffuse myalgias cramping in legs and arms was muscular complaints.  Again reviewed her right middle finger but felt no other scleroderma.  She departed this visit prior to completion.    She was more recently seen in 2024 with Dr. Borja ([facility]) and become and then sought to establish with me    Patient has right middle finger quite shortened, she does have a medial upper arm divot that appears like morphea, girth of RUE even. She has a scar on the right lateral ankle with markedly different size feet which suggests she may have had RLE involvement. Describes sharp intense cramping and stinging pain intermittently and random regardless activity or no activity. Worse spasms at night.   Tried:   Cymbalta: used for fibro tolerated did not think this was for her linear scl  Mg no significan t benefit  Robaxin no working now.   Mobic- feels this is not helping.     Currently  relevant medications include:  Meloxicam  Methocarbamol         REVIEW OF SYSTEMS:    Review of Systems   Constitutional:  Negative for fever, malaise/fatigue and weight loss.   HENT:  Negative for sore throat.    Eyes:  Negative for double vision, photophobia and redness.   Respiratory:  Negative for cough, shortness of breath and wheezing.    Cardiovascular:  Negative for chest pain, palpitations and orthopnea.   Gastrointestinal:  Negative for abdominal pain, constipation and diarrhea.   Genitourinary:  Negative for dysuria, hematuria and urgency.   Musculoskeletal:  Negative for back pain, joint pain and myalgias.   Skin:  Negative for rash.   Neurological:  Negative for dizziness, tingling, focal weakness and headaches.   Endo/Heme/Allergies:  Does not bruise/bleed easily.   Psychiatric/Behavioral:  Negative for depression, hallucinations and suicidal ideas.                Objective:            Past Medical History:   Diagnosis Date    Scleroderma      Family History   Problem Relation Name Age of Onset    Heart disease Mother      Hypertension Mother      Lupus Paternal Aunt      Glaucoma Neg Hx      Macular degeneration Neg Hx       Social History[1]      Medications Ordered Prior to Encounter[2]    Vitals:    04/08/25 0919   BP: (!) 159/92   Pulse: 81       Physical Exam:    Physical Exam  Constitutional:       Appearance: She is well-developed.   HENT:      Head: Normocephalic and atraumatic.   Eyes:      Pupils: Pupils are equal, round, and reactive to light.   Cardiovascular:      Rate and Rhythm: Normal rate and regular rhythm.      Heart sounds: Normal heart sounds.   Pulmonary:      Effort: Pulmonary effort is normal.      Breath sounds: Normal breath sounds.   Musculoskeletal:      Right shoulder: No swelling or tenderness. Normal range of motion.      Left shoulder: No swelling or tenderness. Normal range of motion.      Right elbow: No swelling. Normal range of motion. No tenderness.      Left  elbow: No swelling. Normal range of motion. No tenderness.      Right wrist: No swelling or tenderness. Normal range of motion.      Left wrist: No swelling or tenderness. Normal range of motion.      Right hand: No swelling or tenderness. Normal range of motion.      Left hand: No swelling or tenderness. Normal range of motion.      Cervical back: Normal range of motion.      Right knee: No swelling. Normal range of motion. No tenderness.      Left knee: No swelling. Normal range of motion. No tenderness.      Right foot: Normal range of motion. No swelling or tenderness.      Left foot: Normal range of motion. No swelling or tenderness.   Skin:     General: Skin is warm and dry.      Comments: Patient with some linear scleroderma changes in ankle and calf, extends laterally to proximal calf with loss of subq tissue -   Her right foot much like her right hand is significantly smaller than the left.   Right hand with middle finger, dorasal and palmar   david clear, then another RUE medial puckering that could be linear SCL.     18 tender points examined in nine pairs: Posterior occiput, bilateral trapezius, bilateral supraspinatus, bilateral gluteal muscles, bilateral low cervical neck, bilateral second rib, bilateral lateral epicondyles, bilateral greater trochanters, bilateral medial knees. 16 Of 18 points examined were positive for tenderness.     Neurological:      Mental Status: She is alert and oriented to person, place, and time.   Psychiatric:         Behavior: Behavior normal.             Assessment:       Encounter Diagnosis   Name Primary?    Linear scleroderma Yes          Plan:        Linear scleroderma  Comments:  hx of as child  Orders:  -     Sedimentation rate; Future; Expected date: 04/08/2025  -     C-Reactive Protein; Future; Expected date: 04/08/2025  -     Anti-scleroderma antibody; Future; Expected date: 04/08/2025  -     RNA polymerase III Ab, IgG; Future; Expected date: 04/08/2025  -      "SCL-70 Antibodies; Future; Expected date: 04/08/2025  -     Anti Sm/RNP Antibody; Future; Expected date: 04/08/2025  -     Th/To Antibody; Future; Expected date: 04/08/2025  -     MyoMarker Panel 3; Future; Expected date: 04/08/2025  -     pregabalin (LYRICA) 50 MG capsule; Take 1 capsule (50 mg total) by mouth 2 (two) times daily.  Dispense: 90 capsule; Refill: 4  -     X-Ray Foot AP Bilateral; Future; Expected date: 04/08/2025    Fibromyalgia    Patient with hx of what does appears to be  right LE and R UE linear scleroderma that appears to have spared ABD and Head with  no progression in decades. Will check serologies but no overt physical exam findings to suspect systemic sclerosis.     She has pain which she has attributed to scleroderma and in the affected regions hand and leg this appears to be possible from fascial disruption, she has other pain more globally that I suspect is FMS/central sensitization in nature. We spent some time discussing this and how we can manage but not "treat" or "cure" this. I think some fascial manipulation may benefit her for hands and legs and we will discuss ASTYM once labs are resulted and reviewed on f/u  More globally we discussed management strategies for FMS including some medications and physical activity.         Follow up in about 6 weeks (around 5/20/2025).      60min consultation with greater than 50% of that time included Preparing to see the patient (review records, tests), Obtaining and/or reviewing separately obtained historical data, Performing a medically appropriate examination and/or evaluation , Ordering medications, tests, and/or procedures, Referring and communicating with other healthcare professionals , Documenting clinical information in the electronic or other health record and Independently interpreting results  (as warranted) & communicating results to the patient/family/caregiver. All questions answered.  Thank you for allowing me to participate in " "the care of this very pleasant patient.                         [1]  Social History  Tobacco Use    Smoking status: Every Day     Current packs/day: 0.50     Average packs/day: 0.5 packs/day for 38.3 years (19.1 ttl pk-yrs)     Types: Cigarettes     Start date: 1987    Smokeless tobacco: Never   Substance Use Topics    Alcohol use: Yes     Comment: socially    Drug use: No   [2]  Current Outpatient Medications on File Prior to Visit   Medication Sig Dispense Refill    BD INSULIN PEN NEEDLE UF MINI 31 gauge x 3/16" Ndle       blood sugar diagnostic Strp To check BG 1 time daily, to use with insurance preferred meter 100 each 11    ergocalciferol (ERGOCALCIFEROL) 50,000 unit Cap Take 1 capsule (50,000 Units total) by mouth every 7 days. 12 capsule 3    hydroCHLOROthiazide (HYDRODIURIL) 25 MG tablet Take 1 tablet (25 mg total) by mouth once daily. 90 tablet 3    ibuprofen (ADVIL,MOTRIN) 200 MG tablet Take 200 mg by mouth every 6 (six) hours as needed for Pain.      lancets 33 gauge Misc 1 lancet by Misc.(Non-Drug; Combo Route) route 4 (four) times daily.      lancets Misc To check BG 1 time daily, to use with insurance preferred meter 100 each 11    lisinopriL (PRINIVIL,ZESTRIL) 40 MG tablet Take 1 tablet (40 mg total) by mouth once daily. 90 tablet 3    meloxicam (MOBIC) 15 MG tablet Take 1 tablet (15 mg total) by mouth once daily. 30 tablet 11    metFORMIN (GLUCOPHAGE-XR) 500 MG ER 24hr tablet Take 1 tablet (500 mg total) by mouth 2 (two) times daily with meals. 180 tablet 2    methocarbamoL (ROBAXIN) 500 MG Tab Take 1 tablet (500 mg total) by mouth every evening. 30 tablet 11    ondansetron (ZOFRAN-ODT) 8 MG TbDL Take 1 tablet (8 mg total) by mouth every 8 (eight) hours as needed (for nausea). 20 tablet 1    ONETOUCH VERIO TEST STRIPS Strp USE TO TEST ONCE DAILY 100 strip 2    phenylephrine HCL 0.25% 0.25 % suppository Place 1 suppository rectally 2 (two) times daily. 60 suppository 0    " pravastatin (PRAVACHOL) 20 MG tablet Take 1 tablet (20 mg total) by mouth every evening. 90 tablet 3    spironolactone (ALDACTONE) 100 MG tablet Take 100 mg by mouth.      TRULICITY 0.75 mg/0.5 mL pen injector ADMINISTER 0.75 MG UNDER THE SKIN EVERY 7 DAYS 12 pen 1    blood-glucose meter kit To check BG 1 time daily, to use with insurance preferred meter 1 each 0     No current facility-administered medications on file prior to visit.

## 2025-04-10 ENCOUNTER — PATIENT MESSAGE (OUTPATIENT)
Dept: RHEUMATOLOGY | Facility: CLINIC | Age: 55
End: 2025-04-10
Payer: COMMERCIAL

## 2025-04-12 ENCOUNTER — HOSPITAL ENCOUNTER (OUTPATIENT)
Dept: RADIOLOGY | Facility: HOSPITAL | Age: 55
Discharge: HOME | End: 2025-04-12
Attending: INTERNAL MEDICINE
Payer: COMMERCIAL

## 2025-04-12 DIAGNOSIS — L94.1 LINEAR SCLERODERMA: ICD-10-CM

## 2025-04-12 PROCEDURE — 73620 X-RAY EXAM OF FOOT: CPT | Mod: 26,,, | Performed by: RADIOLOGY

## 2025-04-12 PROCEDURE — 73620 X-RAY EXAM OF FOOT: CPT | Mod: TC,50,FY,PO

## 2025-04-24 ENCOUNTER — TELEPHONE (OUTPATIENT)
Dept: HEMATOLOGY/ONCOLOGY | Facility: CLINIC | Age: 55
End: 2025-04-24
Payer: COMMERCIAL

## 2025-04-24 ENCOUNTER — PATIENT MESSAGE (OUTPATIENT)
Dept: HEMATOLOGY/ONCOLOGY | Facility: CLINIC | Age: 55
End: 2025-04-24
Payer: COMMERCIAL

## 2025-04-24 NOTE — TELEPHONE ENCOUNTER
SHARONDAM for patient to call me back to schedule her needed labs for her up-coming appointment with Dr. Davidson.

## 2025-04-28 ENCOUNTER — PATIENT MESSAGE (OUTPATIENT)
Dept: RHEUMATOLOGY | Facility: CLINIC | Age: 55
End: 2025-04-28
Payer: COMMERCIAL

## 2025-04-28 PROBLEM — M34.9 SCLERODERMA: Status: RESOLVED | Noted: 2024-08-19 | Resolved: 2025-04-28

## 2025-05-20 ENCOUNTER — OFFICE VISIT (OUTPATIENT)
Dept: RHEUMATOLOGY | Facility: CLINIC | Age: 55
End: 2025-05-20
Payer: COMMERCIAL

## 2025-05-20 VITALS
BODY MASS INDEX: 22.77 KG/M2 | DIASTOLIC BLOOD PRESSURE: 74 MMHG | HEART RATE: 93 BPM | HEIGHT: 64 IN | WEIGHT: 133.38 LBS | SYSTOLIC BLOOD PRESSURE: 118 MMHG

## 2025-05-20 DIAGNOSIS — M62.838 MUSCLE SPASM: ICD-10-CM

## 2025-05-20 DIAGNOSIS — L94.1 LINEAR SCLERODERMA: ICD-10-CM

## 2025-05-20 DIAGNOSIS — L94.1 LINEAR SCLERODERMA: Primary | ICD-10-CM

## 2025-05-20 PROCEDURE — 3044F HG A1C LEVEL LT 7.0%: CPT | Mod: CPTII,S$GLB,, | Performed by: INTERNAL MEDICINE

## 2025-05-20 PROCEDURE — 4010F ACE/ARB THERAPY RXD/TAKEN: CPT | Mod: CPTII,S$GLB,, | Performed by: INTERNAL MEDICINE

## 2025-05-20 PROCEDURE — 1159F MED LIST DOCD IN RCRD: CPT | Mod: CPTII,S$GLB,, | Performed by: INTERNAL MEDICINE

## 2025-05-20 PROCEDURE — 3008F BODY MASS INDEX DOCD: CPT | Mod: CPTII,S$GLB,, | Performed by: INTERNAL MEDICINE

## 2025-05-20 PROCEDURE — 3066F NEPHROPATHY DOC TX: CPT | Mod: CPTII,S$GLB,, | Performed by: INTERNAL MEDICINE

## 2025-05-20 PROCEDURE — 3074F SYST BP LT 130 MM HG: CPT | Mod: CPTII,S$GLB,, | Performed by: INTERNAL MEDICINE

## 2025-05-20 PROCEDURE — 3078F DIAST BP <80 MM HG: CPT | Mod: CPTII,S$GLB,, | Performed by: INTERNAL MEDICINE

## 2025-05-20 PROCEDURE — 3060F POS MICROALBUMINURIA REV: CPT | Mod: CPTII,S$GLB,, | Performed by: INTERNAL MEDICINE

## 2025-05-20 PROCEDURE — 99214 OFFICE O/P EST MOD 30 MIN: CPT | Mod: S$GLB,,, | Performed by: INTERNAL MEDICINE

## 2025-05-20 PROCEDURE — 99999 PR PBB SHADOW E&M-EST. PATIENT-LVL IV: CPT | Mod: PBBFAC,,, | Performed by: INTERNAL MEDICINE

## 2025-05-20 RX ORDER — BACLOFEN 10 MG/1
10 TABLET ORAL 3 TIMES DAILY PRN
Qty: 90 TABLET | Refills: 2 | Status: SHIPPED | OUTPATIENT
Start: 2025-05-20 | End: 2026-05-20

## 2025-05-20 RX ORDER — PREGABALIN 75 MG/1
75-150 CAPSULE ORAL NIGHTLY
Qty: 60 CAPSULE | Refills: 3 | Status: SHIPPED | OUTPATIENT
Start: 2025-05-20 | End: 2026-05-20

## 2025-05-20 ASSESSMENT — ROUTINE ASSESSMENT OF PATIENT INDEX DATA (RAPID3)
FATIGUE SCORE: 3.3
MDHAQ FUNCTION SCORE: 0.6
PAIN SCORE: 10
PSYCHOLOGICAL DISTRESS SCORE: 2.2
PATIENT GLOBAL ASSESSMENT SCORE: 8.5
TOTAL RAPID3 SCORE: 6.83

## 2025-05-20 NOTE — PROGRESS NOTES
Subjective:          Chief Complaint: Kylee Burgos is a 54 y.o. female who had no chief complaint listed for this encounter.    HPI:    Patient is a 54-year-old female primary diagnosis fibromyalgia with a secondary diagnosis of linear scleroderma.  I was able to find some older records from 2014 indicating a localized scleroderma also known as morphea of the right middle finger at the age of 4 this typically known as en coup de sabre  rarely progresses to systemic scleroderma.  Other by history review appears to have been a diagnosis possibly of rheumatoid arthritis in 2011.    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   SHANE negative, SSA and SSB negative, Scl 70- Smith antibody negative RNP negative myositis panel negative CCP antibody negative and rheumatoid factor negative CPKs were normal inflammatory markers were within normal limit.  She was given Cymbalta for fibro treatment      2017 seen with Dr. Franko Patel.  Primary complaint at this visit felt to be mostly around fibromyalgia with diffuse myalgias cramping in legs and arms was muscular complaints.  Again reviewed her right middle finger but felt no other scleroderma.  She departed this visit prior to completion.    She was more recently seen in 2024 with Dr. Borja (Rheum) and become and then sought to establish with me    Patient has right middle finger quite shortened, she does have a medial upper arm divot that appears like morphea, girth of RUE even. She has a scar on the right lateral ankle with markedly different size feet which suggests she may have had RLE involvement. Describes sharp intense cramping and stinging pain intermittently and random regardless activity or no activity. Worse spasms at night.   Tried:   Cymbalta: used for fibro tolerated did not think this was for her linear scl  Mg no significan t benefit  Robaxin no working now.   Mobic- feels this is not helping.      Currently relevant medications include:  Meloxicam  Methocarbamol         REVIEW OF SYSTEMS:    Review of Systems   Constitutional:  Negative for fever, malaise/fatigue and weight loss.   HENT:  Negative for sore throat.    Eyes:  Negative for double vision, photophobia and redness.   Respiratory:  Negative for cough, shortness of breath and wheezing.    Cardiovascular:  Negative for chest pain, palpitations and orthopnea.   Gastrointestinal:  Negative for abdominal pain, constipation and diarrhea.   Genitourinary:  Negative for dysuria, hematuria and urgency.   Musculoskeletal:  Negative for back pain, joint pain and myalgias.   Skin:  Negative for rash.   Neurological:  Negative for dizziness, tingling, focal weakness and headaches.   Endo/Heme/Allergies:  Does not bruise/bleed easily.   Psychiatric/Behavioral:  Negative for depression, hallucinations and suicidal ideas.                Objective:            Past Medical History:   Diagnosis Date    Scleroderma      Family History   Problem Relation Name Age of Onset    Heart disease Mother      Hypertension Mother      Lupus Paternal Aunt      Glaucoma Neg Hx      Macular degeneration Neg Hx       Social History[1]      Medications Ordered Prior to Encounter[2]    There were no vitals filed for this visit.      Physical Exam:    Physical Exam  Constitutional:       Appearance: She is well-developed.   HENT:      Head: Normocephalic and atraumatic.   Eyes:      Pupils: Pupils are equal, round, and reactive to light.   Cardiovascular:      Rate and Rhythm: Normal rate and regular rhythm.      Heart sounds: Normal heart sounds.   Pulmonary:      Effort: Pulmonary effort is normal.      Breath sounds: Normal breath sounds.   Musculoskeletal:      Right shoulder: No swelling or tenderness. Normal range of motion.      Left shoulder: No swelling or tenderness. Normal range of motion.      Right elbow: No swelling. Normal range of motion. No tenderness.      Left  elbow: No swelling. Normal range of motion. No tenderness.      Right wrist: No swelling or tenderness. Normal range of motion.      Left wrist: No swelling or tenderness. Normal range of motion.      Right hand: No swelling or tenderness. Normal range of motion.      Left hand: No swelling or tenderness. Normal range of motion.      Cervical back: Normal range of motion.      Right knee: No swelling. Normal range of motion. No tenderness.      Left knee: No swelling. Normal range of motion. No tenderness.      Right foot: Normal range of motion. No swelling or tenderness.      Left foot: Normal range of motion. No swelling or tenderness.   Skin:     General: Skin is warm and dry.      Comments: Patient with some linear scleroderma changes in ankle and calf, extends laterally to proximal calf with loss of subq tissue -   Her right foot much like her right hand is significantly smaller than the left.   Right hand with middle finger, dorasal and palmar   david clear, then another RUE medial puckering that could be linear SCL.     18 tender points examined in nine pairs: Posterior occiput, bilateral trapezius, bilateral supraspinatus, bilateral gluteal muscles, bilateral low cervical neck, bilateral second rib, bilateral lateral epicondyles, bilateral greater trochanters, bilateral medial knees. 16 Of 18 points examined were positive for tenderness.     Neurological:      Mental Status: She is alert and oriented to person, place, and time.   Psychiatric:         Behavior: Behavior normal.               Assessment:       No diagnosis found.         Plan:        There are no diagnoses linked to this encounter.  Patient with hx of what does appears to be  right LE and R UE linear scleroderma that appears to have spared ABD and Head with  no progression in decades. Will check serologies but no overt physical exam findings to suspect systemic sclerosis.     She has pain which she has attributed to scleroderma and in the  "affected regions hand and leg this appears to be possible from fascial disruption, she has other pain more globally that I suspect is FMS/central sensitization in nature. We spent some time discussing this and how we can manage but not "treat" or "cure" this. I think some fascial manipulation may benefit her for hands and legs and we will discuss ASTYM once labs are resulted and reviewed on f/u  More globally we discussed management strategies for FMS including some medications and physical activity.     F/u patient w/o any evidence of systemic sclerosis  Increase lyrica to 75mg-150mg at night as not able to take during daytime (somnolent)  Adding baclofen as she has intense spasm of muslce  EMG/NCS to see if myopathy vs neuropathy  PT for ASTYM.       No follow-ups on file.      50min consultation with greater than 50% of that time included Preparing to see the patient (review records, tests), Obtaining and/or reviewing separately obtained historical data, Performing a medically appropriate examination and/or evaluation , Ordering medications, tests, and/or procedures, Referring and communicating with other healthcare professionals , Documenting clinical information in the electronic or other health record and Independently interpreting results  (as warranted) & communicating results to the patient/family/caregiver. All questions answered.  Thank you for allowing me to participate in the care of this very pleasant patient.                         [1]   Social History  Tobacco Use    Smoking status: Every Day     Current packs/day: 0.50     Average packs/day: 0.5 packs/day for 38.4 years (19.2 ttl pk-yrs)     Types: Cigarettes     Start date: 1987    Smokeless tobacco: Never   Substance Use Topics    Alcohol use: Yes     Comment: socially    Drug use: No   [2]   Current Outpatient Medications on File Prior to Visit   Medication Sig Dispense Refill    BD INSULIN PEN NEEDLE UF MINI 31 gauge x 3/16" Ndle       blood " sugar diagnostic Strp To check BG 1 time daily, to use with insurance preferred meter 100 each 11    blood-glucose meter kit To check BG 1 time daily, to use with insurance preferred meter 1 each 0    ergocalciferol (ERGOCALCIFEROL) 50,000 unit Cap Take 1 capsule (50,000 Units total) by mouth every 7 days. 12 capsule 3    hydroCHLOROthiazide (HYDRODIURIL) 25 MG tablet Take 1 tablet (25 mg total) by mouth once daily. 90 tablet 3    ibuprofen (ADVIL,MOTRIN) 200 MG tablet Take 200 mg by mouth every 6 (six) hours as needed for Pain.      lancets 33 gauge Misc 1 lancet by Misc.(Non-Drug; Combo Route) route 4 (four) times daily.      lancets Misc To check BG 1 time daily, to use with insurance preferred meter 100 each 11    lisinopriL (PRINIVIL,ZESTRIL) 40 MG tablet Take 1 tablet (40 mg total) by mouth once daily. 90 tablet 3    meloxicam (MOBIC) 15 MG tablet Take 1 tablet (15 mg total) by mouth once daily. 30 tablet 11    metFORMIN (GLUCOPHAGE-XR) 500 MG ER 24hr tablet Take 1 tablet (500 mg total) by mouth 2 (two) times daily with meals. 180 tablet 2    methocarbamoL (ROBAXIN) 500 MG Tab Take 1 tablet (500 mg total) by mouth every evening. 30 tablet 11    ondansetron (ZOFRAN-ODT) 8 MG TbDL Take 1 tablet (8 mg total) by mouth every 8 (eight) hours as needed (for nausea). 20 tablet 1    ONETOUCH VERIO TEST STRIPS Str USE TO TEST ONCE DAILY 100 strip 2    phenylephrine HCL 0.25% 0.25 % suppository Place 1 suppository rectally 2 (two) times daily. 60 suppository 0    pravastatin (PRAVACHOL) 20 MG tablet Take 1 tablet (20 mg total) by mouth every evening. 90 tablet 3    pregabalin (LYRICA) 50 MG capsule Take 1 capsule (50 mg total) by mouth 2 (two) times daily. 90 capsule 4    spironolactone (ALDACTONE) 100 MG tablet Take 100 mg by mouth.      TRULICITY 0.75 mg/0.5 mL pen injector ADMINISTER 0.75 MG UNDER THE SKIN EVERY 7 DAYS 12 pen 1     No current facility-administered medications on file prior to visit.

## 2025-05-20 NOTE — PATIENT INSTRUCTIONS
Increase lyrica to 75mg-150mg at night as not able to take during daytime     Adding baclofen (new muscle relaxer) can take up to 3 times daily for spasms.     Nerve conduction study with muscle test: they will call to schedule. Test if we have some nerve or muscle damage.     PT for a myofascial release therapy called ESTHER.

## 2025-05-21 ENCOUNTER — CLINICAL SUPPORT (OUTPATIENT)
Dept: REHABILITATION | Facility: HOSPITAL | Age: 55
End: 2025-05-21
Attending: INTERNAL MEDICINE
Payer: COMMERCIAL

## 2025-05-21 DIAGNOSIS — Z74.09 IMPAIRED MOBILITY AND ACTIVITIES OF DAILY LIVING: Primary | ICD-10-CM

## 2025-05-21 DIAGNOSIS — Z78.9 IMPAIRED MOBILITY AND ACTIVITIES OF DAILY LIVING: Primary | ICD-10-CM

## 2025-05-21 DIAGNOSIS — L94.1 LINEAR SCLERODERMA: ICD-10-CM

## 2025-05-21 PROCEDURE — 97161 PT EVAL LOW COMPLEX 20 MIN: CPT | Mod: PN

## 2025-05-21 PROCEDURE — 97110 THERAPEUTIC EXERCISES: CPT | Mod: PN

## 2025-05-22 PROBLEM — Z78.9 IMPAIRED MOBILITY AND ACTIVITIES OF DAILY LIVING: Status: ACTIVE | Noted: 2025-05-22

## 2025-05-22 PROBLEM — R26.89 ANTALGIC GAIT: Status: RESOLVED | Noted: 2023-08-09 | Resolved: 2025-05-22

## 2025-05-22 PROBLEM — Z74.09 IMPAIRED MOBILITY AND ACTIVITIES OF DAILY LIVING: Status: ACTIVE | Noted: 2025-05-22

## 2025-05-22 PROBLEM — R29.898 WEAKNESS OF RIGHT LOWER EXTREMITY: Status: RESOLVED | Noted: 2023-08-09 | Resolved: 2025-05-22

## 2025-05-22 PROBLEM — M25.671 DECREASED RANGE OF MOTION OF RIGHT ANKLE: Status: RESOLVED | Noted: 2023-08-09 | Resolved: 2025-05-22

## 2025-05-22 NOTE — PROGRESS NOTES
Outpatient Rehab    Physical Therapy Evaluation    Patient Name: Kylee Burgos  MRN: [id number]  YOB: 1970  Encounter Date: 5/21/2025    Therapy Diagnosis:   Encounter Diagnoses   Name Primary?    Linear scleroderma     Impaired mobility and activities of daily living Yes     Physician: Brittany Marie DO    Physician Orders: Eval and Treat  Medical Diagnosis: Linear scleroderma    Visit # / Visits Authorized:  1 / 1  Insurance Authorization Period: 5/20/2025 to 12/31/2025  Date of Evaluation: 5/21/2025  Plan of Care Certification: 5/21/2025 to 8/29/2025     Time In: 1510   Time Out: 1600  Total Time (in minutes): 50   Total Billable Time (in minutes): 50    Intake Outcome Measure for FOTO Survey    Therapist reviewed FOTO scores for Kylee Burgos on 5/21/2025.   FOTO report - see Media section or FOTO account episode details.     Intake Score: 51%    Precautions:       Subjective   History of Present Illness  Kylee is a 54 y.o. female who reports to physical therapy with a chief concern of Muscle spasms in her R arm and leg.     The patient reports a medical diagnosis of L94.1 (ICD-10-CM) - Linear scleroderma.    Diagnostic tests related to this condition: None.        History of Present Condition/Illness: Pain in Right arm and hand; feels like spasms. She is unable to do daily stuff; unable to straighten fingers. Unable to do daily activities. Trying some different types of medication. Happens in Right calf muscle and ankle. Only happening on Right side of body. Happened her whole life; getting worse with age. No neurological conditions that she knows of. Rheumatology MD Ordered a nerve conduction test with a neurologist. Getting  June 20th. Feels fatigued from no sleep. Pt reports she is not sure how PT could help this condition. Reviewed the anatomy and relationship between the nerves and musculoskeletal system with the pt. No recent imaging has been done but a past cervical CT  scan revealed DDD in cervical spine. History Left fibula fx.     Pain     Patient reports a current pain level of 0/10. Pain at best is reported as 0/10. Pain at worst is reported as 8/10.   Location: R-side R arm and hand; R gastroc and toes  Clinical Progression (since onset): Worsening  Pain Qualities: Cramping, Aching, Other (Comment)  Other Pain Qualities: denies n/t  Pain-Relieving Factors: Stretching, Rest, Medications - over-the-counter, Medications - prescription, Other (Comment)  Other Pain-Relieving Factors: hot baths; PROM with other hand or sit/lay on arm  Pain-Aggravating Factors: Other (Comment)  Other Pain-Aggravating Factors: Completely random         Review of Systems  Patient reports: Night Pain, Sleep Disturbance, Cardiac History, and Diabetes  Patient denies: Bladder Incontinence, Bowel Incontinence, Dizziness, Headache, Saddle Numbness, and Cancer History  Additional Red Flag Details: Linear Scleroderma; Fiibromyalgia     Living Arrangements  Living Situation  Housing: Home independently  Living Arrangements: Spouse/significant other    Home Setup  Type of Structure: Apartment/condo  Number of Levels in Home: Two level        Employment  Employment Status: Employed full-time   Health       Past Medical History/Physical Systems Review:   Kylee Burgos  has a past medical history of Scleroderma.    Kylee Burgos  has a past surgical history that includes Hysterectomy and  section.    Kylee has a current medication list which includes the following prescription(s): baclofen, bd ultra-fine mini pen needle, blood sugar diagnostic, blood-glucose meter, ergocalciferol, hydrochlorothiazide, ibuprofen, lancets, lancets, lisinopril, meloxicam, metformin, ondansetron, onetouch verio test strips, phenylephrine hcl 0.25%, pravastatin, pregabalin, spironolactone, and trulicity.    Review of patient's allergies indicates:  No Known Allergies     Objective   Posture                  Upper Trapezius dominant; Right ASIS lower; Smaller size hand and foot on Right side    Cervical Thoracic Sensation  General Cervical/Thoracic Sensation  Intact: Right and Left  Right Cervical/Thoracic Sensation  Intact: Light Touch       Left Cervical/Thoracic Sensation  Intact: Light Touch            Lower Extremity Sensation  General Lumbar/Lower Extremity Sensation  Intact: Right and Left  Right Lumbar/Lower Extremity Sensation  Intact: Light Touch  Right Lumbar/Lower Extremity Sensation Stocking Glove Pattern: No    Left Lumbar/Lower Extremity Sensation  Intact: Light Touch  Left Lumbar/Lower Extremity Sensation Stocking Glove Pattern: No             Spinal Mobility  Lumbosacral Mobility Details: INNOMINATES: Stand - Right ASIS lower / Supine - Right ASIS lower, Right Lower Extremity shorter / Seated - Right Lower Extremity still shorter = possible LLD    Spinal Muscle Palpation     Right Upper Trapezius                 Subcranial Range of Motion   Active Restricted? Passive Restricted? Pain   Flexion No       Protraction No       Retraction Yes         Cervical Range of Motion   Active (deg) Passive (deg) Pain   Flexion 50       Extension 40       Right Lateral Flexion 45       Right Rotation 70       Left Lateral Flexion 40       Left Rotation 70              Thoracic Range of Motion        FLEXIBILITY: Severe restriction of Bilateral Upper Trapezius and Bilateral Pectoralis    Lumbar Range of Motion   Active (deg) Passive (deg) Pain   Flexion  (0 cm from floor)       Extension 20       Right Lateral Flexion 20       Right Rotation         Left Lateral Flexion 20       Left Rotation           WNL rotation - spasm in Right pectoralis when rotating Right       Shoulder Range of Motion     Shoulder, Elbow, or Forearm Range of Motion Details: WNL and no pain    Hip Range of Motion    WNL; no restrictions / Bilateral Hamstring flexibility 0 degrees        Shoulder Strength - Planes of Motion   Right Strength  Right Pain Left Strength Left  Pain   Flexion 5   5     Extension 5   5     ABduction 5   5     ADduction 5   5     Horizontal ABduction 5   5     Horizontal ADduction 5   5     Internal Rotation 0° 5   5     Internal Rotation 90° 5   5     External Rotation 0° 5   5     External Rotation 90° 5   5              Hip Strength - Planes of Motion   Right Strength Right Pain Left Strength Left  Pain   Flexion (L2) 5   5     Extension 5   5     ABduction 5   5     ADduction 5   5     Internal Rotation 5   5     External Rotation 5   5         Ankle/Foot Strength - Planes of Motion   Right Strength Right Pain Left Strength Left  Pain   Dorsiflexion (L4) 4   5     Plantar Flexion (S1) 4   5     Inversion 4   5     Eversion 4   5     Great Toe Flexion 4   5     Great Toe Extension (L5) 4   5     Lesser Toes Flexion           Lesser Toes Extension                     Gait Analysis  Gait Analysis Details  WNL and nonantalgic         Treatment:  Therapeutic Exercise  TE 1: Lumbar rotation, 10x B  TE 2: Dynamic seated Hamstring stretch, [id number]  TE 3: Dynamic Standing lunge Gastroc stretch, [id number]  TE 4: Dynamic Doorway stretch - Low V arms, [id number]      Time Entry(in minutes):  PT Evaluation (Low) Time Entry: 30  Therapeutic Exercise Time Entry: 20    Assessment & Plan   Assessment  Kylee    Presentation of Symptoms: Evolving, Unpredictable  Will Comorbidities Impact Care: Yes  Progression of Rheumatoid condition; Other Co-morbidities    Functional Limitations: Activity tolerance, Completing self-care activities, Completing work/school activities, Manipulating objects, Participating in leisure activities, Performing household chores  Impairments: Abnormal or restricted range of motion, Abnormal muscle firing, Activity intolerance, Pain with functional activity  Personal Factors Affecting Prognosis: Fear/anxiety, Pain    Patient Goal for Therapy (PT): Manage pain better  Prognosis: Good  Prognosis Details: Progressive  condition  Assessment Details: Kylee is a 54 y.o. female referred to outpatient Physical Therapy with a medical diagnosis of linear scleroderma. Pt presents with chronic pain, muscle spasms in her Right UE and Right LE, decreased Upper Trapezius, gastroc and pectoralis flexibility, decreased periscapular muscle and core strength, posture, and impaired mobility and function.     Plan  From a physical therapy perspective, the patient would benefit from: Skilled Rehab Services    Planned therapy interventions include: Therapeutic exercise, Therapeutic activities, Neuromuscular re-education, Manual therapy, and ADLs/IADLs.    Planned modalities to include: Cryotherapy (cold pack), Electrical stimulation - passive/unattended, Mechanical traction, Thermotherapy (hot pack), and Ultrasound.        Visit Frequency: 1 times Per Week for 8 Weeks.       This plan was discussed with Patient.   Discussion participants: Agreed Upon Plan of Care             The patient's spiritual, cultural, and educational needs were considered, and the patient is agreeable to the plan of care and goals.     Education  Education was done with Patient. The patient's learning style includes Demonstration and Listening. The patient Demonstrates understanding and Verbalizes understanding.         Magnesium lotion / Getting up every 30 minutes from sitting / Cardio program to promote weight loss: (Swim, Walk, or Stationary Bike) Every other day; 5 minute start; increase by 5 minute increments; up to 30 minute goal        Goals:   Active       Long term goals       Pt improves Upper Trapezius and pectoralis flexibility to moderate restriction.       Start:  05/21/25    Expected End:  08/29/25            Patient demonstrates increased core and periscapular strength by 1/2 grade or greater to improve tolerance to home chores.       Start:  05/21/25    Expected End:  08/29/25            Patient will improve FOTO function score by 25% to show improvement in  condition and functional mobility.        Start:  05/21/25    Expected End:  08/29/25               Short term goals       Patient demonstrates compliance with HEP.        Start:  05/21/25    Expected End:  06/26/25            Patient demonstrates independence with Postural Awareness while sitting and standing.        Start:  05/21/25    Expected End:  06/19/25            Patient demonstrates decreased pain level of 3/10 while performing daily activities.       Start:  05/21/25    Expected End:  06/19/25                Lanny Brar, PT

## 2025-05-27 ENCOUNTER — TELEPHONE (OUTPATIENT)
Dept: PHYSICAL MEDICINE AND REHAB | Facility: CLINIC | Age: 55
End: 2025-05-27
Payer: COMMERCIAL

## 2025-05-27 NOTE — TELEPHONE ENCOUNTER
Dr. Marie stated right hand in referral if you are asking about the EMG/nerve conduction study    Irma Durán MA  [facility]  5/27/2025

## 2025-05-27 NOTE — TELEPHONE ENCOUNTER
Spoke with patient to schedule EMG.  Patient stated right upper extremity is what is given her pain.  I did sen a message to requesting physician to clarify extremity that needs to be tested.

## 2025-06-04 ENCOUNTER — CLINICAL SUPPORT (OUTPATIENT)
Dept: REHABILITATION | Facility: HOSPITAL | Age: 55
End: 2025-06-04
Payer: COMMERCIAL

## 2025-06-04 DIAGNOSIS — Z78.9 IMPAIRED MOBILITY AND ACTIVITIES OF DAILY LIVING: Primary | ICD-10-CM

## 2025-06-04 DIAGNOSIS — Z74.09 IMPAIRED MOBILITY AND ACTIVITIES OF DAILY LIVING: Primary | ICD-10-CM

## 2025-06-04 PROCEDURE — 97112 NEUROMUSCULAR REEDUCATION: CPT | Mod: PN

## 2025-06-04 PROCEDURE — 97140 MANUAL THERAPY 1/> REGIONS: CPT | Mod: PN

## 2025-06-04 PROCEDURE — 97110 THERAPEUTIC EXERCISES: CPT | Mod: PN

## 2025-06-05 DIAGNOSIS — R20.0 HAND NUMBNESS: ICD-10-CM

## 2025-06-05 DIAGNOSIS — L94.1 LINEAR SCLERODERMA: Primary | ICD-10-CM

## 2025-06-11 ENCOUNTER — CLINICAL SUPPORT (OUTPATIENT)
Dept: REHABILITATION | Facility: HOSPITAL | Age: 55
End: 2025-06-11
Payer: COMMERCIAL

## 2025-06-11 DIAGNOSIS — Z78.9 IMPAIRED MOBILITY AND ACTIVITIES OF DAILY LIVING: Primary | ICD-10-CM

## 2025-06-11 DIAGNOSIS — Z74.09 IMPAIRED MOBILITY AND ACTIVITIES OF DAILY LIVING: Primary | ICD-10-CM

## 2025-06-11 PROCEDURE — 97140 MANUAL THERAPY 1/> REGIONS: CPT | Mod: PN

## 2025-06-11 PROCEDURE — 97112 NEUROMUSCULAR REEDUCATION: CPT | Mod: PN

## 2025-06-11 PROCEDURE — 97110 THERAPEUTIC EXERCISES: CPT | Mod: PN

## 2025-06-12 NOTE — PROGRESS NOTES
Outpatient Rehab    Physical Therapy Visit    Patient Name: Kylee Burgos  MRN: [id number]  YOB: 1970  Encounter Date: 6/11/2025    Therapy Diagnosis:   Encounter Diagnosis   Name Primary?    Impaired mobility and activities of daily living Yes     Physician: Brittany Marie DO    Physician Orders: Eval and Treat  Medical Diagnosis: Linear scleroderma    Visit # / Visits Authorized:  2 / 14  Insurance Authorization Period: 5/21/2025 to 12/31/2025  Date of Evaluation: 5/21/2025  Plan of Care Certification: 5/21/2025 to 8/29/2025      PT/PTA:     Number of PTA visits since last PT visit:   Time In: 1501   Time Out: 1556  Total Time (in minutes): 55   Total Billable Time (in minutes): 55    FOTO:  Intake Score: 51%  Survey Score 2:  %  Survey Score 3:  %    Precautions:       Subjective   Exercises going well. Leg has less cramping. About the same. Will be getting  and going on honeymoon. Will return 6/25..  Pain reported as 0/10. R-side R arm and hand; R gastroc and toes    Objective            Treatment:  Therapeutic Exercise  TE 2: Dynamic seated Hamstring stretch, [id number]  TE 3: Dynamic Standing lunge Gastroc stretch, [id number]  TE 4: AAROM: Stand wand R cj abduction, 10x  TE 5: AAROM: Supine wand cj flexion / Elbow extension, 10x ea - just flexion today  Manual Therapy  MT 1: MFR and friction massage along R pectoralis, anterior shoulder and biceps, forearm, hand  MT 2: PROM to R hand and digits  MT 3: IASTM to R pec insertion and biceps  Balance/Neuromuscular Re-Education  NMR 2: Snow angels against wall, 5x, 2 sets    Time Entry(in minutes):  Hot/Cold Pack Time Entry: 5  Manual Therapy Time Entry: 15  Neuromuscular Re-Education Time Entry: 10  Therapeutic Exercise Time Entry: 25    Assessment & Plan   Assessment: Pt demonstrates variable high levels of pain in her R bicep and hand and kyphotic posture. Noted improvement in RLE. Pt able to perform all therex given with minimal pn  provocation. Focus on dynamic gentle stretching, AAROM, and passive ROM to reduce spasms. Added IASTM to pec insertion and bicep. Continue to progress as tolerated.  Evaluation/Treatment Tolerance: Patient tolerated treatment well    The patient will continue to benefit from skilled outpatient physical therapy in order to address the deficits listed in the problem list on the initial evaluation, provide patient and family education, and maximize the patients level of independence in the home and community environments.     The patient's spiritual, cultural, and educational needs were considered, and the patient is agreeable to the plan of care and goals.           Plan: Continue PT as deemed per POC: manual tx, heat, AAROM/PROM therex to reduce spasms    Goals:   Active       Long term goals       Pt improves Upper Trapezius and pectoralis flexibility to moderate restriction. (Progressing)       Start:  05/21/25    Expected End:  08/29/25            Patient demonstrates increased core and periscapular strength by 1/2 grade or greater to improve tolerance to home chores. (Progressing)       Start:  05/21/25    Expected End:  08/29/25            Patient will improve FOTO function score by 25% to show improvement in condition and functional mobility.  (Progressing)       Start:  05/21/25    Expected End:  08/29/25               Short term goals       Patient demonstrates compliance with HEP.  (Progressing)       Start:  05/21/25    Expected End:  06/26/25            Patient demonstrates independence with Postural Awareness while sitting and standing.  (Progressing)       Start:  05/21/25    Expected End:  06/19/25            Patient demonstrates decreased pain level of 3/10 while performing daily activities. (Progressing)       Start:  05/21/25    Expected End:  06/19/25                Lanny Brar, PT